# Patient Record
Sex: MALE | Race: WHITE | Employment: UNEMPLOYED | URBAN - METROPOLITAN AREA
[De-identification: names, ages, dates, MRNs, and addresses within clinical notes are randomized per-mention and may not be internally consistent; named-entity substitution may affect disease eponyms.]

---

## 2022-05-15 ENCOUNTER — APPOINTMENT (OUTPATIENT)
Dept: MRI IMAGING | Age: 77
DRG: 056 | End: 2022-05-15
Payer: OTHER GOVERNMENT

## 2022-05-15 ENCOUNTER — APPOINTMENT (OUTPATIENT)
Dept: GENERAL RADIOLOGY | Age: 77
DRG: 056 | End: 2022-05-15
Payer: OTHER GOVERNMENT

## 2022-05-15 ENCOUNTER — APPOINTMENT (OUTPATIENT)
Dept: CT IMAGING | Age: 77
DRG: 056 | End: 2022-05-15
Payer: OTHER GOVERNMENT

## 2022-05-15 ENCOUNTER — HOSPITAL ENCOUNTER (INPATIENT)
Age: 77
LOS: 12 days | Discharge: NURSING, REHAB OR LONG-TERM CARE | DRG: 056 | End: 2022-05-27
Attending: EMERGENCY MEDICINE | Admitting: INTERNAL MEDICINE
Payer: OTHER GOVERNMENT

## 2022-05-15 DIAGNOSIS — R55 SYNCOPE AND COLLAPSE: Primary | ICD-10-CM

## 2022-05-15 DIAGNOSIS — D69.6 THROMBOCYTOPENIA (HCC): ICD-10-CM

## 2022-05-15 PROBLEM — R53.1 WEAKNESS: Status: ACTIVE | Noted: 2022-05-15

## 2022-05-15 LAB
-: NORMAL
ABSOLUTE EOS #: 0.26 K/UL (ref 0–0.4)
ABSOLUTE IMMATURE GRANULOCYTE: 0 K/UL (ref 0–0.3)
ABSOLUTE LYMPH #: 0.43 K/UL (ref 1–4.8)
ABSOLUTE MONO #: 0.94 K/UL (ref 0.1–0.8)
ALBUMIN SERPL-MCNC: 3.7 G/DL (ref 3.5–5.2)
ALBUMIN/GLOBULIN RATIO: 1.3 (ref 1–2.5)
ALP BLD-CCNC: 55 U/L (ref 40–129)
ALT SERPL-CCNC: 21 U/L (ref 5–41)
ANION GAP SERPL CALCULATED.3IONS-SCNC: 11 MMOL/L (ref 9–17)
AST SERPL-CCNC: 42 U/L
BASOPHILS # BLD: 1 % (ref 0–2)
BASOPHILS ABSOLUTE: 0.09 K/UL (ref 0–0.2)
BILIRUB SERPL-MCNC: 0.59 MG/DL (ref 0.3–1.2)
BILIRUBIN URINE: NEGATIVE
BUN BLDV-MCNC: 45 MG/DL (ref 8–23)
CALCIUM SERPL-MCNC: 8.8 MG/DL (ref 8.6–10.4)
CHLORIDE BLD-SCNC: 103 MMOL/L (ref 98–107)
CO2: 29 MMOL/L (ref 20–31)
COLOR: YELLOW
CREAT SERPL-MCNC: 1.83 MG/DL (ref 0.7–1.2)
D-DIMER QUANTITATIVE: 1.21 MG/L FEU
EOSINOPHILS RELATIVE PERCENT: 3 % (ref 1–4)
EPITHELIAL CELLS UA: NORMAL /HPF (ref 0–5)
FOLATE: 8.2 NG/ML
GFR AFRICAN AMERICAN: 44 ML/MIN
GFR NON-AFRICAN AMERICAN: 36 ML/MIN
GFR SERPL CREATININE-BSD FRML MDRD: ABNORMAL ML/MIN/{1.73_M2}
GLUCOSE BLD-MCNC: 97 MG/DL (ref 70–99)
GLUCOSE URINE: NEGATIVE
HCT VFR BLD CALC: 35.4 % (ref 40.7–50.3)
HEMOGLOBIN: 11.5 G/DL (ref 13–17)
IMMATURE GRANULOCYTES: 0 %
KETONES, URINE: NEGATIVE
LACTIC ACID, WHOLE BLOOD: 0.8 MMOL/L (ref 0.7–2.1)
LACTIC ACID, WHOLE BLOOD: 2.2 MMOL/L (ref 0.7–2.1)
LEUKOCYTE ESTERASE, URINE: NEGATIVE
LYMPHOCYTES # BLD: 5 % (ref 24–44)
MCH RBC QN AUTO: 29.9 PG (ref 25.2–33.5)
MCHC RBC AUTO-ENTMCNC: 32.5 G/DL (ref 28.4–34.8)
MCV RBC AUTO: 91.9 FL (ref 82.6–102.9)
MONOCYTES # BLD: 11 % (ref 1–7)
MORPHOLOGY: ABNORMAL
NITRITE, URINE: NEGATIVE
NRBC AUTOMATED: 0 PER 100 WBC
PDW BLD-RTO: 13.5 % (ref 11.8–14.4)
PH UA: 5 (ref 5–8)
PLATELET # BLD: 143 K/UL (ref 138–453)
PMV BLD AUTO: 12.8 FL (ref 8.1–13.5)
POTASSIUM SERPL-SCNC: 4.6 MMOL/L (ref 3.7–5.3)
PRO-BNP: 439 PG/ML
PROTEIN UA: NEGATIVE
RBC # BLD: 3.85 M/UL (ref 4.21–5.77)
RBC UA: NORMAL /HPF (ref 0–4)
SEG NEUTROPHILS: 80 % (ref 36–66)
SEGMENTED NEUTROPHILS ABSOLUTE COUNT: 6.78 K/UL (ref 1.8–7.7)
SODIUM BLD-SCNC: 143 MMOL/L (ref 135–144)
SPECIFIC GRAVITY UA: 1.01 (ref 1–1.03)
TOTAL PROTEIN: 6.5 G/DL (ref 6.4–8.3)
TROPONIN, HIGH SENSITIVITY: 55 NG/L (ref 0–22)
TROPONIN, HIGH SENSITIVITY: 59 NG/L (ref 0–22)
TSH SERPL DL<=0.05 MIU/L-ACNC: 2.93 UIU/ML (ref 0.3–5)
TURBIDITY: CLEAR
URINE HGB: NEGATIVE
UROBILINOGEN, URINE: NORMAL
VITAMIN B-12: 730 PG/ML (ref 232–1245)
WBC # BLD: 8.5 K/UL (ref 3.5–11.3)
WBC UA: NORMAL /HPF (ref 0–5)

## 2022-05-15 PROCEDURE — 2060000000 HC ICU INTERMEDIATE R&B

## 2022-05-15 PROCEDURE — 82607 VITAMIN B-12: CPT

## 2022-05-15 PROCEDURE — 84443 ASSAY THYROID STIM HORMONE: CPT

## 2022-05-15 PROCEDURE — 6360000004 HC RX CONTRAST MEDICATION: Performed by: HEALTH CARE PROVIDER

## 2022-05-15 PROCEDURE — 2580000003 HC RX 258: Performed by: HEALTH CARE PROVIDER

## 2022-05-15 PROCEDURE — 82570 ASSAY OF URINE CREATININE: CPT

## 2022-05-15 PROCEDURE — 85379 FIBRIN DEGRADATION QUANT: CPT

## 2022-05-15 PROCEDURE — 2500000003 HC RX 250 WO HCPCS: Performed by: HEALTH CARE PROVIDER

## 2022-05-15 PROCEDURE — 3209999900 CT THORACIC SPINE TRAUMA RECONSTRUCTION

## 2022-05-15 PROCEDURE — 96375 TX/PRO/DX INJ NEW DRUG ADDON: CPT

## 2022-05-15 PROCEDURE — 84300 ASSAY OF URINE SODIUM: CPT

## 2022-05-15 PROCEDURE — 71045 X-RAY EXAM CHEST 1 VIEW: CPT

## 2022-05-15 PROCEDURE — 6360000002 HC RX W HCPCS: Performed by: HEALTH CARE PROVIDER

## 2022-05-15 PROCEDURE — 70450 CT HEAD/BRAIN W/O DYE: CPT

## 2022-05-15 PROCEDURE — 71260 CT THORAX DX C+: CPT

## 2022-05-15 PROCEDURE — 2580000003 HC RX 258

## 2022-05-15 PROCEDURE — 81001 URINALYSIS AUTO W/SCOPE: CPT

## 2022-05-15 PROCEDURE — 83605 ASSAY OF LACTIC ACID: CPT

## 2022-05-15 PROCEDURE — 72158 MRI LUMBAR SPINE W/O & W/DYE: CPT

## 2022-05-15 PROCEDURE — 80053 COMPREHEN METABOLIC PANEL: CPT

## 2022-05-15 PROCEDURE — 85025 COMPLETE CBC W/AUTO DIFF WBC: CPT

## 2022-05-15 PROCEDURE — 83880 ASSAY OF NATRIURETIC PEPTIDE: CPT

## 2022-05-15 PROCEDURE — 84484 ASSAY OF TROPONIN QUANT: CPT

## 2022-05-15 PROCEDURE — 82746 ASSAY OF FOLIC ACID SERUM: CPT

## 2022-05-15 PROCEDURE — 99285 EMERGENCY DEPT VISIT HI MDM: CPT

## 2022-05-15 PROCEDURE — 96365 THER/PROPH/DIAG IV INF INIT: CPT

## 2022-05-15 PROCEDURE — 6360000002 HC RX W HCPCS

## 2022-05-15 PROCEDURE — A9579 GAD-BASE MR CONTRAST NOS,1ML: HCPCS | Performed by: HEALTH CARE PROVIDER

## 2022-05-15 PROCEDURE — 93005 ELECTROCARDIOGRAM TRACING: CPT | Performed by: HEALTH CARE PROVIDER

## 2022-05-15 PROCEDURE — 96376 TX/PRO/DX INJ SAME DRUG ADON: CPT

## 2022-05-15 PROCEDURE — 99223 1ST HOSP IP/OBS HIGH 75: CPT | Performed by: INTERNAL MEDICINE

## 2022-05-15 PROCEDURE — 72131 CT LUMBAR SPINE W/O DYE: CPT

## 2022-05-15 PROCEDURE — 72125 CT NECK SPINE W/O DYE: CPT

## 2022-05-15 RX ORDER — SODIUM CHLORIDE 0.9 % (FLUSH) 0.9 %
10 SYRINGE (ML) INJECTION PRN
Status: DISCONTINUED | OUTPATIENT
Start: 2022-05-15 | End: 2022-05-27 | Stop reason: HOSPADM

## 2022-05-15 RX ORDER — LORAZEPAM 2 MG/ML
1 INJECTION INTRAMUSCULAR ONCE
Status: COMPLETED | OUTPATIENT
Start: 2022-05-15 | End: 2022-05-15

## 2022-05-15 RX ORDER — ACETAMINOPHEN 650 MG/1
650 SUPPOSITORY RECTAL EVERY 6 HOURS PRN
Status: DISCONTINUED | OUTPATIENT
Start: 2022-05-15 | End: 2022-05-26

## 2022-05-15 RX ORDER — SODIUM CHLORIDE 9 MG/ML
INJECTION, SOLUTION INTRAVENOUS CONTINUOUS
Status: DISCONTINUED | OUTPATIENT
Start: 2022-05-15 | End: 2022-05-23

## 2022-05-15 RX ORDER — SODIUM CHLORIDE 0.9 % (FLUSH) 0.9 %
5-40 SYRINGE (ML) INJECTION PRN
Status: DISCONTINUED | OUTPATIENT
Start: 2022-05-15 | End: 2022-05-27 | Stop reason: HOSPADM

## 2022-05-15 RX ORDER — SODIUM CHLORIDE 0.9 % (FLUSH) 0.9 %
5-40 SYRINGE (ML) INJECTION EVERY 12 HOURS SCHEDULED
Status: DISCONTINUED | OUTPATIENT
Start: 2022-05-15 | End: 2022-05-27 | Stop reason: HOSPADM

## 2022-05-15 RX ORDER — POLYETHYLENE GLYCOL 3350 17 G/17G
17 POWDER, FOR SOLUTION ORAL DAILY PRN
Status: DISCONTINUED | OUTPATIENT
Start: 2022-05-15 | End: 2022-05-27 | Stop reason: HOSPADM

## 2022-05-15 RX ORDER — ONDANSETRON 4 MG/1
4 TABLET, ORALLY DISINTEGRATING ORAL EVERY 8 HOURS PRN
Status: DISCONTINUED | OUTPATIENT
Start: 2022-05-15 | End: 2022-05-27 | Stop reason: HOSPADM

## 2022-05-15 RX ORDER — SODIUM CHLORIDE 9 MG/ML
INJECTION, SOLUTION INTRAVENOUS PRN
Status: DISCONTINUED | OUTPATIENT
Start: 2022-05-15 | End: 2022-05-27 | Stop reason: HOSPADM

## 2022-05-15 RX ORDER — HEPARIN SODIUM 5000 [USP'U]/ML
5000 INJECTION, SOLUTION INTRAVENOUS; SUBCUTANEOUS EVERY 8 HOURS SCHEDULED
Status: DISCONTINUED | OUTPATIENT
Start: 2022-05-15 | End: 2022-05-18

## 2022-05-15 RX ORDER — ACETAMINOPHEN 325 MG/1
650 TABLET ORAL EVERY 6 HOURS PRN
Status: DISCONTINUED | OUTPATIENT
Start: 2022-05-15 | End: 2022-05-26

## 2022-05-15 RX ORDER — ONDANSETRON 2 MG/ML
4 INJECTION INTRAMUSCULAR; INTRAVENOUS EVERY 6 HOURS PRN
Status: DISCONTINUED | OUTPATIENT
Start: 2022-05-15 | End: 2022-05-27 | Stop reason: HOSPADM

## 2022-05-15 RX ADMIN — FOLIC ACID: 5 INJECTION, SOLUTION INTRAMUSCULAR; INTRAVENOUS; SUBCUTANEOUS at 16:41

## 2022-05-15 RX ADMIN — IOPAMIDOL 75 ML: 755 INJECTION, SOLUTION INTRAVENOUS at 17:32

## 2022-05-15 RX ADMIN — LORAZEPAM 1 MG: 2 INJECTION INTRAMUSCULAR; INTRAVENOUS at 17:11

## 2022-05-15 RX ADMIN — GADOTERIDOL 10 ML: 279.3 INJECTION, SOLUTION INTRAVENOUS at 20:37

## 2022-05-15 RX ADMIN — LORAZEPAM 1 MG: 2 INJECTION INTRAMUSCULAR at 19:39

## 2022-05-15 RX ADMIN — HEPARIN SODIUM 5000 UNITS: 5000 INJECTION INTRAVENOUS; SUBCUTANEOUS at 23:01

## 2022-05-15 RX ADMIN — LORAZEPAM 1 MG: 2 INJECTION INTRAMUSCULAR at 20:02

## 2022-05-15 RX ADMIN — SODIUM CHLORIDE: 9 INJECTION, SOLUTION INTRAVENOUS at 16:42

## 2022-05-15 RX ADMIN — SODIUM CHLORIDE: 9 INJECTION, SOLUTION INTRAVENOUS at 22:02

## 2022-05-15 RX ADMIN — SODIUM CHLORIDE, PRESERVATIVE FREE 10 ML: 5 INJECTION INTRAVENOUS at 23:02

## 2022-05-15 ASSESSMENT — ENCOUNTER SYMPTOMS
CHEST TIGHTNESS: 0
ABDOMINAL PAIN: 0
COUGH: 0
CONSTIPATION: 0
APNEA: 0
STRIDOR: 0
EYE PAIN: 0
DIARRHEA: 0
SHORTNESS OF BREATH: 0
PHOTOPHOBIA: 0
ABDOMINAL DISTENTION: 0
NAUSEA: 0
WHEEZING: 0
VOMITING: 0
CHOKING: 0
SORE THROAT: 0
TROUBLE SWALLOWING: 0

## 2022-05-15 NOTE — ED NOTES
Pt alert and awake and answering all questions appropriately.       Merleen Kocher, RN  05/15/22 4424

## 2022-05-15 NOTE — ED NOTES
Pt to the ER via [facility] with witnessed syncopal episode at home. Pt did not fall from standing per brother. Pt was hypotensive and responding to pain upon arrival. Placed on full cardiac monitor. Pt has h/o Parkinson's-tremors to BUE noted.  Dr. Moreno Headings at the bedside to evaluate the pt     Kerry Wallace RN  05/15/22 8122

## 2022-05-15 NOTE — CONSULTS
Neurology Consult Note      Reason for Consult:  PD not on medications   Requesting Physician:  Dr. Aguayo Mccarthy:  syncope    History Obtained From:  patient, family member - brother, cousin         HISTORY OF PRESENT ILLNESS:              The patient is a 77 y.o. male with significant past medical history of PD ETOH abuse who presents with syncopal episode that occurred earlier this afternoon after he was on the toilet and called his brother stating he was very weak and could not get up. The majority of the HPI was obtained by his brother who he lives with and his cousin. His care is managed at the [facility] [place] in [place] and does not follow with a neurologist.     His cousin states that he had a syncopal event earlier this afternoon and had a LOC for at least 5 minutes. Prior to this he reportedly required assistance getting out of the bed due to severe generalized weakness and was incontinent. This is now the second time this has occurred. The 1st time was 5/13/22 and reportedly had immediate recovery after the episode without confusion. There was bowel and bladder incontinence at that time. No seizure activity. Family states that he was diagnosed by the [facility] [place] with PD over 5 years ago and has not been on any medications for unknown reasons. They report bilat upper extremity tremors for over 10 years. Strong family history of PD.      CTH negative. CT spine imaging showing multiple round hypodense lesions throughout the lumbar spine most pronounced within L3 vertebral body. He has a history of ETOH abuse with his last drink weeks ago. SBP on arrival 90. He was given IVF. WBC 8.5. Afebrile. Past Medical History:    No past medical history on file. Past Surgical History:    No past surgical history on file. Current Medications:   Current Facility-Administered Medications: 0.9 % sodium chloride infusion, , IntraVENous, Continuous  Allergies:  Patient has no known allergies.     Social History:  TOBACCO:   has no history on file for tobacco use. ETOH:   has no history on file for alcohol use. DRUGS:   has no history on file for drug use. ACTIVITIES OF DAILY LIVING:    INSTRUMENTAL ACTIVITIES OF DAILY LIVING:  Patient is unable to perform all instrumental activities of daily living. Patient's caretaker/ helper is brother. Family History:   No family history on file. REVIEW OF SYSTEMS:  Review of Systems   Constitutional: Negative for activity change, appetite change, chills, diaphoresis, fatigue, fever and unexpected weight change. HENT: Negative for tinnitus and trouble swallowing. Eyes: Negative for photophobia, pain and visual disturbance. Respiratory: Negative for apnea, cough, choking, chest tightness, shortness of breath, wheezing and stridor. Cardiovascular: Negative for chest pain, palpitations and leg swelling. Gastrointestinal: Negative for abdominal distention, abdominal pain, constipation, diarrhea, nausea and vomiting. Genitourinary: Negative for dysuria. Musculoskeletal: Negative for gait problem, neck pain and neck stiffness. Skin: Negative for rash. Neurological: Positive for tremors and weakness. Negative for dizziness, seizures, syncope, facial asymmetry, speech difficulty, light-headedness, numbness and headaches. PHYSICAL EXAM:    Vitals:  /79   Pulse 81   Temp 98.3 °F (36.8 °C) (Oral)   Resp 14   Wt 125 lb (56.7 kg)   SpO2 95%        Physical Exam  Vitals and nursing note reviewed. Constitutional:       General: He is not in acute distress. Appearance: He is well-developed and underweight. He is not diaphoretic. HENT:      Head: Normocephalic and atraumatic. Right Ear: External ear normal.      Left Ear: External ear normal.   Eyes:      General: No scleral icterus. Right eye: No discharge. Left eye: No discharge.       Conjunctiva/sclera: Conjunctivae normal.      Pupils: Pupils are equal, round, and reactive to light. Cardiovascular:      Rate and Rhythm: Normal rate. Pulmonary:      Effort: Pulmonary effort is normal. No respiratory distress. Abdominal:      General: There is no distension. Palpations: Abdomen is soft. Tenderness: There is no abdominal tenderness. There is no guarding. Musculoskeletal:         General: No tenderness or deformity. Normal range of motion. Cervical back: Normal range of motion. Skin:     General: Skin is warm and dry. Capillary Refill: Capillary refill takes less than 2 seconds. Findings: No erythema or rash. Neurological:      General: No focal deficit present. Mental Status: He is alert and oriented to person, place, and time. Mental status is at baseline. GCS: GCS eye subscore is 4. GCS verbal subscore is 5. GCS motor subscore is 6. Cranial Nerves: Cranial nerves are intact. No cranial nerve deficit, dysarthria or facial asymmetry. Sensory: Sensation is intact. No sensory deficit. Motor: Tremor and abnormal muscle tone present. No seizure activity. Coordination: Coordination normal.      Deep Tendon Reflexes: Reflexes are normal and symmetric. Reflexes normal.   Psychiatric:         Behavior: Behavior normal. Behavior is cooperative.        DATA  Recent Results (from the past 24 hour(s))   CBC with Auto Differential    Collection Time: 05/15/22  4:02 PM   Result Value Ref Range    WBC 8.5 3.5 - 11.3 k/uL    RBC 3.85 (L) 4.21 - 5.77 m/uL    Hemoglobin 11.5 (L) 13.0 - 17.0 g/dL    Hematocrit 35.4 (L) 40.7 - 50.3 %    MCV 91.9 82.6 - 102.9 fL    MCH 29.9 25.2 - 33.5 pg    MCHC 32.5 28.4 - 34.8 g/dL    RDW 13.5 11.8 - 14.4 %    Platelets 032 400 - 357 k/uL    MPV 12.8 8.1 - 13.5 fL    NRBC Automated 0.0 0.0 per 100 WBC    Immature Granulocytes 0 0 %    Seg Neutrophils 80 (H) 36 - 66 %    Lymphocytes 5 (L) 24 - 44 %    Monocytes 11 (H) 1 - 7 %    Eosinophils % 3 1 - 4 %    Basophils 1 0 - 2 %    Absolute Immature Granulocyte 0.00 0.00 - 0.30 k/uL    Segs Absolute 6.78 1.8 - 7.7 k/uL    Absolute Lymph # 0.43 (L) 1.0 - 4.8 k/uL    Absolute Mono # 0.94 (H) 0.1 - 0.8 k/uL    Absolute Eos # 0.26 0.0 - 0.4 k/uL    Basophils Absolute 0.09 0.0 - 0.2 k/uL    Morphology 1+ SCHISTOCYTES    Comprehensive Metabolic Panel w/ Reflex to MG    Collection Time: 05/15/22  4:02 PM   Result Value Ref Range    Glucose 97 70 - 99 mg/dL    BUN 45 (H) 8 - 23 mg/dL    CREATININE 1.83 (H) 0.70 - 1.20 mg/dL    Calcium 8.8 8.6 - 10.4 mg/dL    Sodium 143 135 - 144 mmol/L    Potassium 4.6 3.7 - 5.3 mmol/L    Chloride 103 98 - 107 mmol/L    CO2 29 20 - 31 mmol/L    Anion Gap 11 9 - 17 mmol/L    Alkaline Phosphatase 55 40 - 129 U/L    ALT 21 5 - 41 U/L    AST 42 (H) <40 U/L    Total Bilirubin 0.59 0.3 - 1.2 mg/dL    Total Protein 6.5 6.4 - 8.3 g/dL    Albumin 3.7 3.5 - 5.2 g/dL    Albumin/Globulin Ratio 1.3 1.0 - 2.5    GFR Non- 36 (L) >60 mL/min    GFR  44 (L) >60 mL/min    GFR Comment         Troponin    Collection Time: 05/15/22  4:02 PM   Result Value Ref Range    Troponin, High Sensitivity 59 (HH) 0 - 22 ng/L   D-Dimer, Quantitative    Collection Time: 05/15/22  4:02 PM   Result Value Ref Range    D-Dimer, Quant 1.21 mg/L FEU   Lactic Acid    Collection Time: 05/15/22  4:02 PM   Result Value Ref Range    Lactic Acid, Whole Blood 2.2 (H) 0.7 - 2.1 mmol/L   Vitamin B12 & Folate    Collection Time: 05/15/22  4:04 PM   Result Value Ref Range    Vitamin B-12 730 232 - 1245 pg/mL    Folate 8.2 >4.8 ng/mL   TSH with Reflex    Collection Time: 05/15/22  5:28 PM   Result Value Ref Range    TSH 2.93 0.30 - 5.00 uIU/mL   Brain Natriuretic Peptide    Collection Time: 05/15/22  5:28 PM   Result Value Ref Range    Pro- (H) <300 pg/mL   Troponin    Collection Time: 05/15/22  5:28 PM   Result Value Ref Range    Troponin, High Sensitivity 55 (HH) 0 - 22 ng/L   Urinalysis with Microscopic    Collection Time: 05/15/22  6:29 PM Result Value Ref Range    Color, UA Yellow Yellow    Turbidity UA Clear Clear    Glucose, Ur NEGATIVE NEGATIVE    Bilirubin Urine NEGATIVE NEGATIVE    Ketones, Urine NEGATIVE NEGATIVE    Specific Gravity, UA 1.015 1.005 - 1.030    Urine Hgb NEGATIVE NEGATIVE    pH, UA 5.0 5.0 - 8.0    Protein, UA NEGATIVE NEGATIVE    Urobilinogen, Urine Normal Normal    Nitrite, Urine NEGATIVE NEGATIVE    Leukocyte Esterase, Urine NEGATIVE NEGATIVE    -          WBC, UA None 0 - 5 /HPF    RBC, UA None 0 - 4 /HPF    Epithelial Cells UA None 0 - 5 /HPF     IMAGING    CT Head WO Contrast    Result Date: 5/15/2022  Markedly limited exam due to motion artifact. No gross acute intracranial abnormality. CT Cervical Spine WO Contrast    Result Date: 5/15/2022  No acute abnormality of the cervical spine. Advanced multilevel degenerative disc disease and facet arthropathy. CT Lumbar Spine WO Contrast    Result Date: 5/15/2022  Chest CT angiogram: No evidence of pulmonary embolism or acute pulmonary abnormality. Cholelithiasis. Moderate free fluid within peritoneal cavity may be suggestive of ascites. CT of thoracic spine: No acute osseous abnormality. No fracture. CT of lumbar spine: No acute osseous abnormality. No fracture. Mild levoscoliosis of thoracolumbar junction. Multiple round hypodense lesions are noted throughout the lumbar spine most pronounced within L3 vertebral body. Findings may be related to osteopenia and subchondral cystic change. Marrow infiltrative lesions cannot be completely excluded. RECOMMENDATIONS: Unavailable       CT CHEST PULMONARY EMBOLISM W CONTRAST    Result Date: 5/15/2022  Chest CT angiogram: No evidence of pulmonary embolism or acute pulmonary abnormality. Cholelithiasis. Moderate free fluid within peritoneal cavity may be suggestive of ascites. CT of thoracic spine: No acute osseous abnormality. No fracture. CT of lumbar spine: No acute osseous abnormality. No fracture.   Mild levoscoliosis of thoracolumbar junction. Multiple round hypodense lesions are noted throughout the lumbar spine most pronounced within L3 vertebral body. Findings may be related to osteopenia and subchondral cystic change. Marrow infiltrative lesions cannot be completely excluded. RECOMMENDATIONS: Unavailable     CT THORACIC SPINE TRAUMA RECONSTRUCTION    Result Date: 5/15/2022  Chest CT angiogram: No evidence of pulmonary embolism or acute pulmonary abnormality. Cholelithiasis. Moderate free fluid within peritoneal cavity may be suggestive of ascites. CT of thoracic spine: No acute osseous abnormality. No fracture. CT of lumbar spine: No acute osseous abnormality. No fracture. Mild levoscoliosis of thoracolumbar junction. Multiple round hypodense lesions are noted throughout the lumbar spine most pronounced within L3 vertebral body. Findings may be related to osteopenia and subchondral cystic change. Marrow infiltrative lesions cannot be completely excluded. RECOMMENDATIONS: Unavailable         IMPRESSION     Case of a 78 yo M presenting with a syncopal event after complaining of generalized weakness with borderline hypotension when EMS arrived. 5-10 year history of PD not on medications per the [facility]? RECOMMENDATIONS:   1. CTH negative  2. CT lumbar spine showing multiple hypodense lesions throughout the lumbar spine most pronounced within L3. Reccomend further workup with MRI Lumbar spine W WO. This may necessitate a neurosurgery consult. 3. Recommend starting a trial of sinemet 25/100 TID and follow up with neurology at the [facility] or in our clinic. His PD diagnosis was reportedly made by his PCP at the [facility] 5 years ago and is not on any anti-parkinson's medications. Depending on response, his neurologist can adjust the dose/frequency. 4. recommend starting florinef 0.1 mg daily if IVF do not help with pressures. Depending on response his neurologist can adjust from there.    5. Recommend outpatient parkinson's physical therapy. Thank you for the consultation. Will follow. Case consulted with Dr. Albino Ayala.        Josh Cook MD, [facility]  PGY-4 Neurology  5/15/2022 at 7:44 PM

## 2022-05-15 NOTE — CARE COORDINATION
Advance Care Planning     Advance Care Planning Clinical Specialist  Conversation Note      Date of ACP Conversation: 5/15/2022    Conversation Conducted with:  Healthcare Decision Maker: Next of Kin by law (only applies in absence of above) (name) brother Ruma Jones, no legal documents, no children     ACP Clinical Specialist: Glenn Leon RN        Healthcare Decision Maker:     Current Designated Healthcare Decision Maker:     Primary Decision Maker: Christine Turner - Brother/Sister - [phone]  Click here to complete Healthcare Decision Makers including section of the Healthcare Decision Maker Relationship (ie \"Primary\")  Today we documented Decision Maker(s) consistent with Legal Next of Kin hierarchy. Care Preferences    Hospitalization: \"If your health worsens and it becomes clear that your chance of recovery is unlikely, what would your preference be regarding hospitalization? \"    Choice:  [] The patient wants hospitalization. [] The patient prefers comfort-focused treatment without hospitalization. Ventilation: \"If you were in your present state of health and suddenly became very ill and were unable to breathe on your own, what would your preference be about the use of a ventilator (breathing machine) if it were available to you? \"      If the patient would desire the use of ventilator (breathing machine), answer \"yes\". If not, \"no\":     \"If your health worsens and it becomes clear that your chance of recovery is unlikely, what would your preference be about the use of a ventilator (breathing machine) if it were available to you? \"     Would the patient desire the use of ventilator (breathing machine)?:       Resuscitation  \"CPR works best to restart the heart when there is a sudden event, like a heart attack, in someone who is otherwise healthy. Unfortunately, CPR does not typically restart the heart for people who have serious health conditions or who are very sick. \"    \"In the event your heart stopped as a result of an underlying serious health condition, would you want attempts to be made to restart your heart (answer \"yes\" for attempt to resuscitate) or would you prefer a natural death (answer \"no\" for do not attempt to resuscitate)? \"        [] Yes   [] No   Educated Patient / [place] regarding differences between Advance Directives and portable DNR orders.     Length of ACP Conversation in minutes:      Conversation Outcomes:  [] ACP discussion completed  [] Existing advance directive reviewed with patient; no changes to patient's previously recorded wishes  [] New Advance Directive completed  [] Portable Do Not Rescitate prepared for Provider review and signature  [] POLST/POST/MOLST/MOST prepared for Provider review and signature      Follow-up plan:    [] Schedule follow-up conversation to continue planning  [] Referred individual to Provider for additional questions/concerns   [] Advised patient/agent/surrogate to review completed ACP document and update if needed with changes in condition, patient preferences or care setting    [] This note routed to one or more involved healthcare providers

## 2022-05-15 NOTE — CARE COORDINATION
Case Management Initial Discharge Plan  Michael Pérez,             Met with:family member brother Wilton Alva to discuss discharge plans. Information verified: address, contacts, phone number, , insurance Yes  Insurance Provider: [facility]    Emergency Contact/Next of Kin name & number: Dustin Arrieta (brother) [phone]  Who are involved in patient's support system? brother    [facility]  Date of last visit: 1 year ago      Discharge Planning    Living Arrangements:    with brother    Home has 1 stories  1 stairs to climb to get into front door,   Location of bedroom/bathroom in home main    Patient able to perform ADL's: assisted    Current Services (outpatient & in home) [facility] to start this week  DME equipment: rollator  [facility] provider[facility]    Is patient receiving oral anticoagulation therapy? No    If indicated:   Physician managing anticoagulation treatment:   Where does patient obtain lab work for ATC treatment? Does patient have any issues/concerns obtaining medications? No  If yes, what are patient's concerns? Is there a preferred Pharmacy after hours or on weekends? Yes    If yes, which [facility]? [facility]    Potential Assistance Needed:       Patient agreeable to home care: Yes  Freedom of choice provided:  yes    Prior SNF/Rehab Placement and Facility: no  Agreeable to SNF/Rehab: Yes  [place] of choice provided: yes     Evaluation: no    Expected Discharge date:       Patient expects to be discharged to: If home: is the family and/or caregiver wiling & able to provide support at home? Who will be providing this support? Follow Up Appointment: Best Day/ Time:      Transportation provider: needs  Transportation arrangements needed for discharge: No    Readmission Risk              Risk of Unplanned Readmission:  0             Does patient have a readmission risk score greater than 14?:   If yes, follow-up appointment must be made within 7 days of discharge.      Goals of Care:       Educated family on transitional options, provided freedom of choice and are agreeable with plan      Discharge Plan: from home with brother, HHA supposed to start tomorrow.   Likely needs SNF, [facility]    1941 family relates they'd like a referral to [facility], referral faxed      Electronically signed by Bárbara Abarca RN on 5/15/22 at 7:33 PM EDT

## 2022-05-15 NOTE — ED PROVIDER NOTES
[address] [facility]     Emergency Department     Faculty Attestation    I performed a history and physical examination of the patient and discussed management with the resident. I reviewed the resident´s note and agree with the documented findings and plan of care. Any areas of disagreement are noted on the chart. I was personally present for the key portions of any procedures. I have documented in the chart those procedures where I was not present during the key portions. I have reviewed the emergency nurses triage note. I agree with the chief complaint, past medical history, past surgical history, allergies, medications, social and family history as documented unless otherwise noted below. For Physician Assistant/ Nurse Practitioner cases/documentation I have personally evaluated this patient and have completed at least one if not all key elements of the E/M (history, physical exam, and MDM). Additional findings are as noted. Parkinson disease, confusion. Moving all extremities.        EKG Interpretation (difficult to interpret due to motion artifact secondary to Parkinson's disease)    Interpreted by emergency department physician    Rhythm: normal sinus   Rate: normal/91  Axis: Left -31 degrees  Ectopy: none  Conduction: normal  Possible lateral ischemic ST and T wave changes  Q Waves: none    Clinical Impression: Abnormal EKG    MUSA Rey MD  05/15/22 1805       EKG Interpretation (repeat with less motion artifact)    Interpreted by emergency department physician    Rhythm: normal sinus   Rate: normal/83  Axis: Left -48 degrees  Ectopy: none  Conduction: normal  Nonspecific ST and T wave changes  Q Waves: none  Left anterior hemiblock    Clinical Impression: Abnormal EKG    MUSA Rey MD  05/15/22 1014

## 2022-05-15 NOTE — LETTER
[facility]  [address] [address]  Phone: [phone]    No name on file. May 23, 2022     No primary care provider on file. No primary provider on file. Patient: Wiley Devine   MR Number: [id number]   YOB: 1945   Date of Visit: 5/15/2022       Dear No primary care provider on file.:    Thank you for referring Wiley Devine to me for evaluation/treatment. Below are the relevant portions of my assessment and plan of care. If you have questions, please do not hesitate to call me. I look forward to following Kang Carver along with you. Sincerely,      No name on file.

## 2022-05-15 NOTE — CONSULTS
Department of Neurosurgery                                       Resident Consult Note      Reason for Consult: B/L leg weakness  Requesting Physician:  Dr. Christopher Smith:   [x]Dr. Jordana Mancilla  []Dr. Yvonne Mariee  []Dr. Clyde Salcedo     History Obtained From:  Patient and ED resident    CHIEF COMPLAINT:         Bilateral leg weakness    HISTORY OF PRESENT ILLNESS:       The patient is a 77 y.o. male who presents with history of Parkinson, hyperlipidemia and high blood pressure who initially presented with EMS due to syncopal episode. Per report that was given, patient has been have restively worsened lower extremity weakness in the last several weeks. Patient reports that he has been having difficulty standing up from sitting down. At some point he reported having urinary incontinence, no bladder or bowel loss. Patient otherwise denies having any fever, chills. Of not patient is a chronic/daily alcohol user. On arrival, his CT scan of the lumbar did show multiple round hypodense lesion noted throughout most specifically at the L3 vertebral body. Findings could be due to osteopenia or subchondral cystic change. MRI showed echodense lesion throughout the lumbar spine consistent with subchondral cystic change. With L5-S1 grade 1 anterolisthesis and bilateral pars defect and severe bilateral neuroforaminal narrowing. Due to progressive symptoms, neurosurgery consulted for further recommendation. On my assessment, patient was somnolent, he is arousable to sternal rub, he hemodynamically stable, not tachycardic, pressures in the 558M systolic, satting 95% on supplemental oxygen. When he does wake up, he is only able to answer his name and his age, unable to tell me where he is located. Patient with no obvious focal neurodeficits over his upper and lower extremities. PAST MEDICAL HISTORY :       Past Medical History:    No past medical history on file.     Past Surgical History:    No past surgical history on file. Social History:   Social History     Socioeconomic History    Marital status: Single     Spouse name: Not on file    Number of children: Not on file    Years of education: Not on file    Highest education level: Not on file   Occupational History    Not on file   Tobacco Use    Smoking status: Not on file    Smokeless tobacco: Not on file   Substance and Sexual Activity    Alcohol use: Not on file    Drug use: Not on file    Sexual activity: Not on file   Other Topics Concern    Not on file   Social History Narrative    Not on file     Social Determinants of Health     Financial Resource Strain:     Difficulty of Paying Living Expenses: Not on file   Food Insecurity:     Worried About Running Out of Food in the Last Year: Not on file    Ashley of Food in the Last Year: Not on file   Transportation Needs:     Lack of Transportation (Medical): Not on file    Lack of Transportation (Non-Medical): Not on file   Physical Activity:     Days of Exercise per Week: Not on file    Minutes of Exercise per Session: Not on file   Stress:     Feeling of Stress : Not on file   Social Connections:     Frequency of Communication with Friends and Family: Not on file    Frequency of Social Gatherings with Friends and Family: Not on file    Attends Yazdanism Services: Not on file    Active Member of [address] [place] [address] [employer] or Organizations: Not on file    Attends Club or Organization Meetings: Not on file    Marital Status: Not on file   Intimate Partner Violence:     Fear of Current or Ex-Partner: Not on file    Emotionally Abused: Not on file    Physically Abused: Not on file    Sexually Abused: Not on file   Housing Stability:     Unable to Pay for Housing in the Last Year: Not on file    Number of [place] in the Last Year: Not on file    Unstable Housing in the Last Year: Not on file       Family History:   No family history on file. Allergies:  Patient has no known allergies.     Home Medications:  Prior to Admission medications    Not on File       Current Medications:   Current Facility-Administered Medications: 0.9 % sodium chloride infusion, , IntraVENous, Continuous    REVIEW OF SYSTEMS:       CONSTITUTIONAL: negative for fatigue and malaise   EYES: negative for double vision and photophobia    HEENT: negative for tinnitus and sore throat   RESPIRATORY: negative for cough, shortness of breath   CARDIOVASCULAR: negative for chest pain, palpitations   GASTROINTESTINAL: negative for nausea, vomiting   GENITOURINARY: negative for incontinence   MUSCULOSKELETAL: negative for neck or back pain   NEUROLOGICAL: negative for seizures   PSYCHIATRIC: negative for agitated     Review of systems otherwise negative. PHYSICAL EXAM:       BP (!) 160/120   Pulse 89   Temp 98.3 °F (36.8 °C) (Oral)   Resp 14   Wt 125 lb (56.7 kg)   SpO2 98%       CONSTITUTIONAL:  Well developed, well nourished, alert and oriented x 2, in no acute distress. GCS 15   HEAD:  normocephalic, atraumatic    EYES:  Pt with eyes closed most of my exam   ENT:  moist mucous membranes   NECK:  supple, symmetric, no midline tenderness to palpation    BACK:  without midline tenderness, step-offs or deformities    LUNGS:  Equal air entry bilaterally   CARDIOVASCULAR:  normal s1 / s2   ABDOMEN:  Soft, no rigidity   NEUROLOGIC:  EYE OPENING     Spontaneous - 4 []       To voice - 3 []       To pain - 2 []       None - 1 []    VERBAL RESPONSE     Appropriate, oriented - 5 []       Dazed or confused - 4 []       Syllables, expletives - 3 []       Grunts - 2 []       None - 1 []    MOTOR RESPONSE     Spontaneous, command - 6 []       Localizes pain - 5 []       Withdraws pain - 4 []       Abnormal flexion - 3 []       Abnormal extension - 2 []       None - 1 []            Total GCS: 15    Mental Status: Patient was a bit difficult to arouse however he recently received Ativan while he was at MRI.   He is awake to sternal rub, he is able to answer his name, his age. Unable to answer where he is located. ,              Cranial Nerves:    cranial nerves II-XII are grossly intact    Motor Exam:        RUE 5/5      LUE 5/5      RLE 5/5      LLE 5/5  Downgoing plantar flexion and bilateral lower extremity   SKIN:  no rash      LABS AND IMAGING:     CBC with Differential:    Lab Results   Component Value Date    WBC 8.5 05/15/2022    RBC 3.85 05/15/2022    HGB 11.5 05/15/2022    HCT 35.4 05/15/2022     05/15/2022    MCV 91.9 05/15/2022    MCH 29.9 05/15/2022    MCHC 32.5 05/15/2022    RDW 13.5 05/15/2022    LYMPHOPCT 5 05/15/2022    MONOPCT 11 05/15/2022    BASOPCT 1 05/15/2022    MONOSABS 0.94 05/15/2022    LYMPHSABS 0.43 05/15/2022    EOSABS 0.26 05/15/2022    BASOSABS 0.09 05/15/2022     BMP:    Lab Results   Component Value Date     05/15/2022    K 4.6 05/15/2022     05/15/2022    CO2 29 05/15/2022    BUN 45 05/15/2022    LABALBU 3.7 05/15/2022    CREATININE 1.83 05/15/2022    CALCIUM 8.8 05/15/2022    GFRAA 44 05/15/2022    LABGLOM 36 05/15/2022    GLUCOSE 97 05/15/2022       Radiology Review:      CT HEAD  Impression   Markedly limited exam due to motion artifact.  No gross acute intracranial   abnormality     CT CERVICAL  Impression   No acute abnormality of the cervical spine.       Advanced multilevel degenerative disc disease and facet arthropathy. CT THORACIC AND LUMBAR, CTA CHEST  Impression   Chest CT angiogram: No evidence of pulmonary embolism or acute pulmonary   abnormality.       Cholelithiasis.  Moderate free fluid within peritoneal cavity may be   suggestive of ascites.       CT of thoracic spine: No acute osseous abnormality.  No fracture.       CT of lumbar spine: No acute osseous abnormality.  No fracture.  Mild   levoscoliosis of thoracolumbar junction.       Multiple round hypodense lesions are noted throughout the lumbar spine most   pronounced within L3 vertebral body.  Findings may be related to osteopenia and subchondral cystic change.  Marrow infiltrative lesions cannot be   completely excluded.       RECOMMENDATIONS:   Unavailable     MRI LUMBAR  Impression   The previously noted round hypodense lesions throughout the lumbar spine   correspond to subchondral cystic change/degenerative findings.  No evidence   of marrow infiltrative lesion is noted.       Partial visualization of a massive cystic structure within the right side of   peritoneal cavity, measuring approximately 14 cm. The lesion does not appear   to be arising from the right kidney.  If there is need for further   evaluation, CT of the abdomen and pelvis can be obtained.       At L5-S1, grade 1 anterolisthesis, bilateral pars defects and severe   bilateral neural foraminal narrowing       RECOMMENDATIONS:   Unavailable         ASSESSMENT AND PLAN:     There is no problem list on file for this patient. A/P:  This is a 77 y.o. male with history of Parkinson's, chronic alcohol use, presented with syncopal episode. Patient reported bilateral lower extremity weakness. He did have one episode of urine incontinence however no urine retention or bowel loss. Patient with GCS of 15, he does not have any focal neurodeficits on physical exam.  He is laying in a mildly contracture form. MRI did show subchondral cystic change in addition to the L5-S1 grade 1 anterior listhesis bilateral pars defect and severe bilateral neural neural foraminal narrowing. Patient care will be discussed with attending, will reevaluate patient along with attending     - No neurosurgical interventions planned for now  - CTLS recommendations: none  - Encourage ambulation (PT/OT)  - Measure post void residual   - Neuro checks per protocol  - Hold all antiplatelets and anticoagulants  - We recommend SBP < 140    Additional recommendations may follow    Please contact neurosurgery with any changes in patients neurologic status. Thank you for your consult.        Marta Josephine Drummond MD   NS pager [phone]  5/15/2022  7:14 PM

## 2022-05-15 NOTE — ED PROVIDER NOTES
[facility]  ED  Emergency Department Encounter  EmergencyMedicine Resident     Pt Name:Ba Purdy  MRN: [id number]  [place] 1945  Date of evaluation: 5/15/22  PCP:  No primary care provider on file. This patient was evaluated in the Emergency Department for symptoms described in the history of present illness. The patient was evaluated in the context of the global COVID-19 pandemic, which necessitated consideration that the patient might be at risk for infection with the SARS-CoV-2 virus that causes COVID-19. Institutional protocols and algorithms that pertain to the evaluation of patients at risk for COVID-19 are in a state of rapid change based on information released by regulatory bodies including the CDC and federal and state organizations. These policies and algorithms were followed during the patient's care in the ED. CHIEF COMPLAINT       Chief Complaint   Patient presents with    Loss of Consciousness       HISTORY OF PRESENT ILLNESS  (Location/Symptom, Timing/Onset, Context/Setting, Quality, Duration, Modifying Factors, Severity.)      Freedom Greco is a 77 y.o. male with untreated Parkinson's, HTN, and HLD who presents via EMS with syncope that occurred just prior to arrival.  Shortly after arrival to the ED his cousin who has been his caretaker and his brother who he lives with arrive and provide most history as Mr. Cruzito Wolff was altered on arrival.    They report that he is currently at his baseline, was diagnosed with Parkinson's but not on treatment as he stated he was too far progressed and treatment would not be helpful. He normally ambulates at home without significant difficulty but started having lower extremity weakness in the last several weeks. Yesterday was on the toilet and was unable to get back up due to weakness, required lift assist.  Today his cousin was at the house helping him clean the linens as he wet the bed which was new for him.   She states as he was sitting in the wheeled walker next to the bed he became unresponsive to voice and sternal rub. No abnormal shaking or body movements at that time she was unable to awaken him and called EMS. He remained unresponsive until they were in route and started waking up just prior to arrival.  His initial blood pressure was decreased but reading hypertensive in the emergency room, possibly due to extensive Parkinson shaking. He was perseverating on stating \"I'm going to die\". Unable to give clear history other than saying that his back was hurting and his legs were weak. Longstanding heavy drinker, usually goes to the bar regularly but 2 to 4 weeks ago became too weak and stopped driving, has not drank since then, brother states there is no alcohol in the home and is confident his last drink was over 2 weeks ago. Family reports he is currently at his baseline once they arrive. Medical care is at the [address] [place] [address], limited record access, family reports his last visit was over a year ago. They wanted home to be evaluated when he started feeling more week but he didn't want to go. PAST MEDICAL / SURGICAL / SOCIAL / FAMILY HISTORY      has no past medical history on file. Anemia, Cataract ,CHF, Elevated PSA ,Glaucoma, HLD, HTN     has no past surgical history on file.   unknown    Social History     Socioeconomic History    Marital status: Single     Spouse name: Not on file    Number of children: Not on file    Years of education: Not on file    Highest education level: Not on file   Occupational History    Not on file   Tobacco Use    Smoking status: Not on file    Smokeless tobacco: Not on file   Substance and Sexual Activity    Alcohol use: Not on file    Drug use: Not on file    Sexual activity: Not on file   Other Topics Concern    Not on file   Social History Narrative    Not on file     Social Determinants of Health     Financial Resource Strain:     Difficulty of Paying Living Expenses: Not on file   Food Insecurity:     Worried About [email or web address] in the Last Year: Not on file    Ashley of Food in the Last Year: Not on file   Transportation Needs:     Lack of Transportation (Medical): Not on file    Lack of Transportation (Non-Medical): Not on file   Physical Activity:     Days of Exercise per Week: Not on file    Minutes of Exercise per Session: Not on file   Stress:     Feeling of Stress : Not on file   Social Connections:     Frequency of Communication with Friends and Family: Not on file    Frequency of Social Gatherings with Friends and Family: Not on file    Attends Spiritism Services: Not on file    Active Member of [address] [place] [address] or Organizations: Not on file    Attends Club or Organization Meetings: Not on file    Marital Status: Not on file   Intimate Partner Violence:     Fear of Current or Ex-Partner: Not on file    Emotionally Abused: Not on file    Physically Abused: Not on file    Sexually Abused: Not on file   Housing Stability:     Unable to Pay for Housing in the Last Year: Not on file    Number of [place] in the Last Year: Not on file    Unstable Housing in the Last Year: Not on file       No family history on file. Allergies:  Patient has no known allergies. Home Medications:  Prior to Admission medications    Not on File       REVIEW OF SYSTEMS    (2-9 systems for level 4, 10 or more for level 5)      Review of Systems   Constitutional: Positive for activity change and fatigue. Negative for appetite change, chills and fever. HENT: Negative for congestion and sore throat. Respiratory: Negative for cough and shortness of breath. Cardiovascular: Negative for chest pain. Gastrointestinal: Negative for abdominal pain, constipation, diarrhea, nausea and vomiting. Genitourinary: Negative for difficulty urinating and hematuria. Musculoskeletal: Negative for arthralgias and myalgias. Skin: Negative for rash and wound.    Neurological: Positive for syncope and weakness. Psychiatric/Behavioral: Positive for confusion. Negative for agitation and dysphoric mood. PHYSICAL EXAM   (up to 7 for level 4, 8 or more for level 5)      INITIAL VITALS:   /79   Pulse 81   Temp 98.3 °F (36.8 °C) (Oral)   Resp 14   Wt 125 lb (56.7 kg)   SpO2 95%     Physical Exam  Constitutional:       General: He is not in acute distress. Appearance: Normal appearance. Comments: Wet sheets   HENT:      Head: Normocephalic and atraumatic. Nose: Congestion present. Mouth/Throat:      Mouth: Mucous membranes are moist.      Pharynx: Oropharynx is clear. Eyes:      Extraocular Movements: Extraocular movements intact. Pupils: Pupils are equal, round, and reactive to light. Cardiovascular:      Rate and Rhythm: Normal rate and regular rhythm. Pulses: Normal pulses. Heart sounds: Normal heart sounds. Pulmonary:      Effort: Pulmonary effort is normal. No respiratory distress. Breath sounds: Normal breath sounds. Abdominal:      General: Abdomen is flat. Bowel sounds are normal.      Palpations: Abdomen is soft. Tenderness: There is no abdominal tenderness. There is no guarding or rebound. Musculoskeletal:         General: No tenderness. Normal range of motion. Cervical back: Normal range of motion and neck supple. No tenderness. Comments: Reports back pain but no tenderness on exam, unable to localize where his back hurts. Skin:     General: Skin is warm and dry. Capillary Refill: Capillary refill takes less than 2 seconds. Neurological:      General: No focal deficit present. Mental Status: He is alert and oriented to person, place, and time. Psychiatric:         Mood and Affect: Mood normal.         Behavior: Behavior normal.         Thought Content:  Thought content normal.         Judgment: Judgment normal.       INITIAL IMPRESSION / DIFFERENTIAL  DIAGNOSIS / PLAN     INITIAL IMPRESSION / DDX:   43-year-old with untreated Parkinson's and long history of alcohol abuse presents for progressive weakness over the last 2 to 4 weeks. Period of unresponsiveness today with mild hypotension responsive to fluids. Completely cardiac and infectious work-up and consult neurology regarding Parkinson's and weakness. Also consider cauda equina given new urinary incontinence, possibly due to overflow retention. We will start with CT head and spine. EMERGENCY DEPARTMENT COURSE:  ED Course as of 05/15/22 1929   Sun May 15, 2022   1558 All records at [facility] [SM]   Mark Patricia 38 phone number only listed in chart - no answer, report from EMS is that family will be coming [SM]   5 Minimal records found, EMS report of Parkinson's (unknown if dementia is at baseline) but not seen on [facility] problem list.   [SM]   5931 Monitor reading tachycardic 160-200s due to shaking, HR in the 70-90 range on EKG and by pulse [SM]   1639 Creatinine(!): 1.83  Cr 1.39 in June of 2021 [SM]   1645 Troponin, High Sensitivity(!!): 59  Denies chest pain, will try to repeat EKG, significant artifact on initial due to shaking [SM]   1645 Reviewed home meds with cousin caretaker - coreg, lasix, lisinpril, pravastatin, flomax [SM]   1755 Spoke to Cards, plan for q6 trop, if doubling/significant increase will start heparin, otherwise trend until peak and decrease [SM]   1800 BP improved while resting  [SM]   1811 CT with chronic microvascular changes, otherwise grossly unremarkable given limits due to motion [SM]   1820 Troponin, High Sensitivity(!!): 55 [SM]   1854 MRI ordered and NS consulted given symptoms and CT spine findings. Consult to IM for admit placed.   [SM]   1393 MRI to be completed tonight, waiting to hear back from medicine for admit, trend trops overnight []      ED Course User Index  [SM] Chase Ramirez MD       PLAN (LABS / IMAGING / EKG):  Orders Placed This Encounter   Procedures    CT Head WO Contrast    CT Cervical 0. 26 0.0 - 0.4 k/uL    Basophils Absolute 0.09 0.0 - 0.2 k/uL    Morphology 1+ SCHISTOCYTES    Comprehensive Metabolic Panel w/ Reflex to MG   Result Value Ref Range    Glucose 97 70 - 99 mg/dL    BUN 45 (H) 8 - 23 mg/dL    CREATININE 1.83 (H) 0.70 - 1.20 mg/dL    Calcium 8.8 8.6 - 10.4 mg/dL    Sodium 143 135 - 144 mmol/L    Potassium 4.6 3.7 - 5.3 mmol/L    Chloride 103 98 - 107 mmol/L    CO2 29 20 - 31 mmol/L    Anion Gap 11 9 - 17 mmol/L    Alkaline Phosphatase 55 40 - 129 U/L    ALT 21 5 - 41 U/L    AST 42 (H) <40 U/L    Total Bilirubin 0.59 0.3 - 1.2 mg/dL    Total Protein 6.5 6.4 - 8.3 g/dL    Albumin 3.7 3.5 - 5.2 g/dL    Albumin/Globulin Ratio 1.3 1.0 - 2.5    GFR Non- 36 (L) >60 mL/min    GFR  44 (L) >60 mL/min    GFR Comment         Troponin   Result Value Ref Range    Troponin, High Sensitivity 59 (HH) 0 - 22 ng/L   D-Dimer, Quantitative   Result Value Ref Range    D-Dimer, Quant 1.21 mg/L FEU   Urinalysis with Microscopic   Result Value Ref Range    Color, UA Yellow Yellow    Turbidity UA Clear Clear    Glucose, Ur NEGATIVE NEGATIVE    Bilirubin Urine NEGATIVE NEGATIVE    Ketones, Urine NEGATIVE NEGATIVE    Specific Gravity, UA 1.015 1.005 - 1.030    Urine Hgb NEGATIVE NEGATIVE    pH, UA 5.0 5.0 - 8.0    Protein, UA NEGATIVE NEGATIVE    Urobilinogen, Urine Normal Normal    Nitrite, Urine NEGATIVE NEGATIVE    Leukocyte Esterase, Urine NEGATIVE NEGATIVE    -          WBC, UA None 0 - 5 /HPF    RBC, UA None 0 - 4 /HPF    Epithelial Cells UA None 0 - 5 /HPF   Lactic Acid   Result Value Ref Range    Lactic Acid, Whole Blood 2.2 (H) 0.7 - 2.1 mmol/L   TSH with Reflex   Result Value Ref Range    TSH 2.93 0.30 - 5.00 uIU/mL   Brain Natriuretic Peptide   Result Value Ref Range    Pro- (H) <300 pg/mL   Troponin   Result Value Ref Range    Troponin, High Sensitivity 55 (HH) 0 - 22 ng/L   Vitamin B12 & Folate   Result Value Ref Range    Vitamin B-12 730 232 - 1245 pg/mL Folate 8.2 >4.8 ng/mL       RADIOLOGY:  CT Head WO Contrast    Result Date: 5/15/2022  EXAMINATION: CT OF THE HEAD WITHOUT CONTRAST  5/15/2022 5:11 pm TECHNIQUE: CT of the head was performed without the administration of intravenous contrast. Automated exposure control, iterative reconstruction, and/or weight based adjustment of the mA/kV was utilized to reduce the radiation dose to as low as reasonably achievable. COMPARISON: None. HISTORY: Reason for Exam: loc, fall, back pain FINDINGS: Motion artifact is present. BRAIN/VENTRICLES: There is no acute intracranial hemorrhage, mass effect or midline shift. No abnormal extra-axial fluid collection. The gray-white differentiation is maintained without evidence of an acute infarct. There is prominence of the ventricles and sulci due to global parenchymal volume loss. There are nonspecific areas of hypoattenuation within the periventricular and subcortical white matter, which likely represent chronic microvascular ischemic change. ORBITS: The visualized portion of the orbits demonstrate no acute abnormality. SINUSES: The visualized paranasal sinuses and mastoid air cells demonstrate no acute abnormality. SOFT TISSUES/SKULL: No acute abnormality of the visualized skull or soft tissues. Markedly limited exam due to motion artifact. No gross acute intracranial abnormality. CT Cervical Spine WO Contrast    Result Date: 5/15/2022  EXAMINATION: CT OF THE CERVICAL SPINE WITHOUT CONTRAST 5/15/2022 5:11 pm TECHNIQUE: CT of the cervical spine was performed without the administration of intravenous contrast. Multiplanar reformatted images are provided for review. Automated exposure control, iterative reconstruction, and/or weight based adjustment of the mA/kV was utilized to reduce the radiation dose to as low as reasonably achievable. COMPARISON: None.  HISTORY: ORDERING SYSTEM PROVIDED HISTORY: unknown fall, LOC, back pain TECHNOLOGIST PROVIDED HISTORY: unknown fall, LOC, back pain Decision Support Exception - unselect if not a suspected or confirmed emergency medical condition->Emergency Medical Condition (MA) Reason for Exam: loc, fall, back pain FINDINGS: BONES/ALIGNMENT: There is no acute fracture or traumatic malalignment. DEGENERATIVE CHANGES: Straightened cervical lordotic curvature with mild kyphotic deformity centered at C5-6. Advanced multilevel degenerative disc disease with disc space narrowing and spurring throughout the cervical spine. Multilevel facet arthropathy. Mild degenerative change at the C1-2 articulation anteriorly. SOFT TISSUES: There is no prevertebral soft tissue swelling. Calcified atherosclerotic plaque at the level the carotid bifurcation bilaterally. Small thyroid nodules with no imaging follow-up indicated. No acute abnormality of the cervical spine. Advanced multilevel degenerative disc disease and facet arthropathy. CT Lumbar Spine WO Contrast    Result Date: 5/15/2022  EXAMINATION: CTA OF THE CHEST; CT OF THE THORACIC SPINE WITHOUT CONTRAST; CT OF THE LUMBAR SPINE WITHOUT CONTRAST 5/15/2022 5:12 pm TECHNIQUE: CTA of the chest was performed after the administration of intravenous contrast.  Multiplanar reformatted images are provided for review. MIP images are provided for review. Automated exposure control, iterative reconstruction, and/or weight based adjustment of the mA/kV was utilized to reduce the radiation dose to as low as reasonably achievable.; CT of the thoracic spine was performed without the administration of intravenous contrast. Multiplanar reformatted images are provided for review. Automated exposure control, iterative reconstruction, and/or weight based adjustment of the mA/kV was utilized to reduce the radiation dose to as low as reasonably achievable.; CT of the lumbar spine was performed without the administration of intravenous contrast. Multiplanar reformatted images are provided for review.   Adjustment of mA and/or kV according to patient size was utilized. Automated exposure control, iterative reconstruction, and/or weight based adjustment of the mA/kV was utilized to reduce the radiation dose to as low as reasonably achievable. COMPARISON: None. HISTORY: ORDERING SYSTEM PROVIDED HISTORY: elevated d-dimer, syncope TECHNOLOGIST PROVIDED HISTORY: elevated d-dimer, syncope Decision Support Exception - unselect if not a suspected or confirmed emergency medical condition->Emergency Medical Condition (MA) Reason for Exam: loc, fall, back pain FINDINGS: Pulmonary Arteries: Pulmonary arteries are adequately opacified for evaluation. No evidence of intraluminal filling defect to suggest pulmonary embolism. Main pulmonary artery is normal in caliber. Mediastinum: No evidence of mediastinal lymphadenopathy. The heart and pericardium demonstrate no acute abnormality. There is no acute abnormality of the thoracic aorta. Lungs/pleura: The lungs are without acute process. No focal consolidation or pulmonary edema. No evidence of pleural effusion or pneumothorax. Upper Abdomen: Cholelithiasis and free fluid within peritoneal cavity which may be secondary to ascites. Soft Tissues/Bones: No acute bone or soft tissue abnormality. Thoracic: BONES/ALIGNMENT: There is no acute fracture or traumatic malalignment. DEGENERATIVE CHANGES: Multilevel disc and facet degenerative changes of thoracic spine most pronounced from T8-T9 to the T11-T12 SOFT TISSUES: There is no prevertebral soft tissue swelling. Lumbar: BONES/ALIGNMENT: There is no acute fracture or traumatic malalignment. Mild levoscoliosis of thoracolumbar junction. Multiple round hypodense lesions are noted throughout the lumbar spine most pronounced within L3 vertebral body. Findings may be related to osteopenia and subchondral cystic change. DEGENERATIVE CHANGES: Multilevel severe disc and facet degenerative disease most pronounced at T12-L1.  At L5-S1, grade 2 anterolisthesis and bilateral pars defects and severe bilateral neural foraminal narrowing SOFT TISSUES: There is no prevertebral soft tissue swelling. Chest CT angiogram: No evidence of pulmonary embolism or acute pulmonary abnormality. Cholelithiasis. Moderate free fluid within peritoneal cavity may be suggestive of ascites. CT of thoracic spine: No acute osseous abnormality. No fracture. CT of lumbar spine: No acute osseous abnormality. No fracture. Mild levoscoliosis of thoracolumbar junction. Multiple round hypodense lesions are noted throughout the lumbar spine most pronounced within L3 vertebral body. Findings may be related to osteopenia and subchondral cystic change. Marrow infiltrative lesions cannot be completely excluded. RECOMMENDATIONS: Unavailable     XR CHEST PORTABLE    Result Date: 5/15/2022  EXAMINATION: ONE XRAY VIEW OF THE CHEST 5/15/2022 4:18 pm COMPARISON: None. HISTORY: ORDERING SYSTEM PROVIDED HISTORY: loc TECHNOLOGIST PROVIDED HISTORY: loc FINDINGS: The cardiomediastinal silhouette is unremarkable. Aortic vascular calcification with moderate tortuosity. Eventration or elevated right hemidiaphragm. No acute infiltrate, pleural fluid or evidence of overt failure. Distended bowel loops in the upper abdomen. Significant superior subluxation of the right humeral head with respect to the glenoid. No acute cardiopulmonary disease. CT CHEST PULMONARY EMBOLISM W CONTRAST    Result Date: 5/15/2022  EXAMINATION: CTA OF THE CHEST; CT OF THE THORACIC SPINE WITHOUT CONTRAST; CT OF THE LUMBAR SPINE WITHOUT CONTRAST 5/15/2022 5:12 pm TECHNIQUE: CTA of the chest was performed after the administration of intravenous contrast.  Multiplanar reformatted images are provided for review. MIP images are provided for review.  Automated exposure control, iterative reconstruction, and/or weight based adjustment of the mA/kV was utilized to reduce the radiation dose to as low as reasonably achievable.; CT of the thoracic spine was performed without the administration of intravenous contrast. Multiplanar reformatted images are provided for review. Automated exposure control, iterative reconstruction, and/or weight based adjustment of the mA/kV was utilized to reduce the radiation dose to as low as reasonably achievable.; CT of the lumbar spine was performed without the administration of intravenous contrast. Multiplanar reformatted images are provided for review. Adjustment of mA and/or kV according to patient size was utilized. Automated exposure control, iterative reconstruction, and/or weight based adjustment of the mA/kV was utilized to reduce the radiation dose to as low as reasonably achievable. COMPARISON: None. HISTORY: ORDERING SYSTEM PROVIDED HISTORY: elevated d-dimer, syncope TECHNOLOGIST PROVIDED HISTORY: elevated d-dimer, syncope Decision Support Exception - unselect if not a suspected or confirmed emergency medical condition->Emergency Medical Condition (MA) Reason for Exam: loc, fall, back pain FINDINGS: Pulmonary Arteries: Pulmonary arteries are adequately opacified for evaluation. No evidence of intraluminal filling defect to suggest pulmonary embolism. Main pulmonary artery is normal in caliber. Mediastinum: No evidence of mediastinal lymphadenopathy. The heart and pericardium demonstrate no acute abnormality. There is no acute abnormality of the thoracic aorta. Lungs/pleura: The lungs are without acute process. No focal consolidation or pulmonary edema. No evidence of pleural effusion or pneumothorax. Upper Abdomen: Cholelithiasis and free fluid within peritoneal cavity which may be secondary to ascites. Soft Tissues/Bones: No acute bone or soft tissue abnormality. Thoracic: BONES/ALIGNMENT: There is no acute fracture or traumatic malalignment.  DEGENERATIVE CHANGES: Multilevel disc and facet degenerative changes of thoracic spine most pronounced from T8-T9 to the T11-T12 SOFT TISSUES: There is no prevertebral soft tissue swelling. Lumbar: BONES/ALIGNMENT: There is no acute fracture or traumatic malalignment. Mild levoscoliosis of thoracolumbar junction. Multiple round hypodense lesions are noted throughout the lumbar spine most pronounced within L3 vertebral body. Findings may be related to osteopenia and subchondral cystic change. DEGENERATIVE CHANGES: Multilevel severe disc and facet degenerative disease most pronounced at T12-L1. At L5-S1, grade 2 anterolisthesis and bilateral pars defects and severe bilateral neural foraminal narrowing SOFT TISSUES: There is no prevertebral soft tissue swelling. Chest CT angiogram: No evidence of pulmonary embolism or acute pulmonary abnormality. Cholelithiasis. Moderate free fluid within peritoneal cavity may be suggestive of ascites. CT of thoracic spine: No acute osseous abnormality. No fracture. CT of lumbar spine: No acute osseous abnormality. No fracture. Mild levoscoliosis of thoracolumbar junction. Multiple round hypodense lesions are noted throughout the lumbar spine most pronounced within L3 vertebral body. Findings may be related to osteopenia and subchondral cystic change. Marrow infiltrative lesions cannot be completely excluded. RECOMMENDATIONS: Unavailable     CT THORACIC SPINE TRAUMA RECONSTRUCTION    Result Date: 5/15/2022  EXAMINATION: CTA OF THE CHEST; CT OF THE THORACIC SPINE WITHOUT CONTRAST; CT OF THE LUMBAR SPINE WITHOUT CONTRAST 5/15/2022 5:12 pm TECHNIQUE: CTA of the chest was performed after the administration of intravenous contrast.  Multiplanar reformatted images are provided for review. MIP images are provided for review.  Automated exposure control, iterative reconstruction, and/or weight based adjustment of the mA/kV was utilized to reduce the radiation dose to as low as reasonably achievable.; CT of the thoracic spine was performed without the administration of intravenous contrast. Multiplanar reformatted images are provided for review. Automated exposure control, iterative reconstruction, and/or weight based adjustment of the mA/kV was utilized to reduce the radiation dose to as low as reasonably achievable.; CT of the lumbar spine was performed without the administration of intravenous contrast. Multiplanar reformatted images are provided for review. Adjustment of mA and/or kV according to patient size was utilized. Automated exposure control, iterative reconstruction, and/or weight based adjustment of the mA/kV was utilized to reduce the radiation dose to as low as reasonably achievable. COMPARISON: None. HISTORY: ORDERING SYSTEM PROVIDED HISTORY: elevated d-dimer, syncope TECHNOLOGIST PROVIDED HISTORY: elevated d-dimer, syncope Decision Support Exception - unselect if not a suspected or confirmed emergency medical condition->Emergency Medical Condition (MA) Reason for Exam: loc, fall, back pain FINDINGS: Pulmonary Arteries: Pulmonary arteries are adequately opacified for evaluation. No evidence of intraluminal filling defect to suggest pulmonary embolism. Main pulmonary artery is normal in caliber. Mediastinum: No evidence of mediastinal lymphadenopathy. The heart and pericardium demonstrate no acute abnormality. There is no acute abnormality of the thoracic aorta. Lungs/pleura: The lungs are without acute process. No focal consolidation or pulmonary edema. No evidence of pleural effusion or pneumothorax. Upper Abdomen: Cholelithiasis and free fluid within peritoneal cavity which may be secondary to ascites. Soft Tissues/Bones: No acute bone or soft tissue abnormality. Thoracic: BONES/ALIGNMENT: There is no acute fracture or traumatic malalignment. DEGENERATIVE CHANGES: Multilevel disc and facet degenerative changes of thoracic spine most pronounced from T8-T9 to the T11-T12 SOFT TISSUES: There is no prevertebral soft tissue swelling.  Lumbar: BONES/ALIGNMENT: There is no acute fracture or traumatic program.  Efforts were made to edit the dictations but occasionally words are mis-transcribed.)     Tao Peoples MD  Resident  05/15/22 2018

## 2022-05-16 ENCOUNTER — APPOINTMENT (OUTPATIENT)
Dept: MRI IMAGING | Age: 77
DRG: 056 | End: 2022-05-16
Payer: OTHER GOVERNMENT

## 2022-05-16 PROBLEM — I95.1 ORTHOSTATIC HYPOTENSION: Status: ACTIVE | Noted: 2022-05-16

## 2022-05-16 LAB
ABSOLUTE EOS #: 0.17 K/UL (ref 0–0.4)
ABSOLUTE IMMATURE GRANULOCYTE: 0 K/UL (ref 0–0.3)
ABSOLUTE LYMPH #: 1.32 K/UL (ref 1–4.8)
ABSOLUTE MONO #: 0.61 K/UL (ref 0.1–0.8)
ANION GAP SERPL CALCULATED.3IONS-SCNC: 9 MMOL/L (ref 9–17)
BASOPHILS # BLD: 1 % (ref 0–2)
BASOPHILS ABSOLUTE: 0.06 K/UL (ref 0–0.2)
BUN BLDV-MCNC: 40 MG/DL (ref 8–23)
CALCIUM SERPL-MCNC: 7.8 MG/DL (ref 8.6–10.4)
CHLORIDE BLD-SCNC: 102 MMOL/L (ref 98–107)
CO2: 26 MMOL/L (ref 20–31)
CREAT SERPL-MCNC: 1.73 MG/DL (ref 0.7–1.2)
EOSINOPHILS RELATIVE PERCENT: 3 % (ref 1–4)
GFR AFRICAN AMERICAN: 47 ML/MIN
GFR NON-AFRICAN AMERICAN: 39 ML/MIN
GFR SERPL CREATININE-BSD FRML MDRD: ABNORMAL ML/MIN/{1.73_M2}
GLUCOSE BLD-MCNC: 70 MG/DL (ref 70–99)
HCT VFR BLD CALC: 26.7 % (ref 40.7–50.3)
HEMOGLOBIN: 8.9 G/DL (ref 13–17)
IMMATURE GRANULOCYTES: 0 %
LEAD BLOOD: 2 UG/DL (ref 0–4)
LYMPHOCYTES # BLD: 24 % (ref 24–44)
MAGNESIUM: 2 MG/DL (ref 1.6–2.6)
MCH RBC QN AUTO: 30.4 PG (ref 25.2–33.5)
MCHC RBC AUTO-ENTMCNC: 33.3 G/DL (ref 28.4–34.8)
MCV RBC AUTO: 91.1 FL (ref 82.6–102.9)
MONOCYTES # BLD: 11 % (ref 1–7)
MORPHOLOGY: NORMAL
NRBC AUTOMATED: 0 PER 100 WBC
PDW BLD-RTO: 13.7 % (ref 11.8–14.4)
PLATELET # BLD: 106 K/UL (ref 138–453)
PMV BLD AUTO: 13 FL (ref 8.1–13.5)
POTASSIUM SERPL-SCNC: 3.3 MMOL/L (ref 3.7–5.3)
RBC # BLD: 2.93 M/UL (ref 4.21–5.77)
SEG NEUTROPHILS: 61 % (ref 36–66)
SEGMENTED NEUTROPHILS ABSOLUTE COUNT: 3.34 K/UL (ref 1.8–7.7)
SODIUM BLD-SCNC: 137 MMOL/L (ref 135–144)
TROPONIN, HIGH SENSITIVITY: 62 NG/L (ref 0–22)
TROPONIN, HIGH SENSITIVITY: 63 NG/L (ref 0–22)
TROPONIN, HIGH SENSITIVITY: 65 NG/L (ref 0–22)
VITAMIN D 25-HYDROXY: 18.5 NG/ML
WBC # BLD: 5.5 K/UL (ref 3.5–11.3)

## 2022-05-16 PROCEDURE — 97163 PT EVAL HIGH COMPLEX 45 MIN: CPT

## 2022-05-16 PROCEDURE — 80048 BASIC METABOLIC PNL TOTAL CA: CPT

## 2022-05-16 PROCEDURE — 2580000003 HC RX 258: Performed by: STUDENT IN AN ORGANIZED HEALTH CARE EDUCATION/TRAINING PROGRAM

## 2022-05-16 PROCEDURE — 99232 SBSQ HOSP IP/OBS MODERATE 35: CPT | Performed by: INTERNAL MEDICINE

## 2022-05-16 PROCEDURE — 2580000003 HC RX 258

## 2022-05-16 PROCEDURE — 6370000000 HC RX 637 (ALT 250 FOR IP): Performed by: STUDENT IN AN ORGANIZED HEALTH CARE EDUCATION/TRAINING PROGRAM

## 2022-05-16 PROCEDURE — 97530 THERAPEUTIC ACTIVITIES: CPT

## 2022-05-16 PROCEDURE — 99233 SBSQ HOSP IP/OBS HIGH 50: CPT | Performed by: PSYCHIATRY & NEUROLOGY

## 2022-05-16 PROCEDURE — 2700000000 HC OXYGEN THERAPY PER DAY

## 2022-05-16 PROCEDURE — 85025 COMPLETE CBC W/AUTO DIFF WBC: CPT

## 2022-05-16 PROCEDURE — 6360000002 HC RX W HCPCS: Performed by: STUDENT IN AN ORGANIZED HEALTH CARE EDUCATION/TRAINING PROGRAM

## 2022-05-16 PROCEDURE — 2060000000 HC ICU INTERMEDIATE R&B

## 2022-05-16 PROCEDURE — 36415 COLL VENOUS BLD VENIPUNCTURE: CPT

## 2022-05-16 PROCEDURE — 83655 ASSAY OF LEAD: CPT

## 2022-05-16 PROCEDURE — 51701 INSERT BLADDER CATHETER: CPT

## 2022-05-16 PROCEDURE — 94761 N-INVAS EAR/PLS OXIMETRY MLT: CPT

## 2022-05-16 PROCEDURE — 83735 ASSAY OF MAGNESIUM: CPT

## 2022-05-16 PROCEDURE — 97535 SELF CARE MNGMENT TRAINING: CPT

## 2022-05-16 PROCEDURE — 84484 ASSAY OF TROPONIN QUANT: CPT

## 2022-05-16 PROCEDURE — 82306 VITAMIN D 25 HYDROXY: CPT

## 2022-05-16 PROCEDURE — 97167 OT EVAL HIGH COMPLEX 60 MIN: CPT

## 2022-05-16 RX ORDER — 0.9 % SODIUM CHLORIDE 0.9 %
1000 INTRAVENOUS SOLUTION INTRAVENOUS ONCE
Status: COMPLETED | OUTPATIENT
Start: 2022-05-16 | End: 2022-05-16

## 2022-05-16 RX ORDER — DEXTROSE MONOHYDRATE 50 MG/ML
100 INJECTION, SOLUTION INTRAVENOUS PRN
Status: DISCONTINUED | OUTPATIENT
Start: 2022-05-16 | End: 2022-05-27 | Stop reason: HOSPADM

## 2022-05-16 RX ORDER — POTASSIUM CHLORIDE 20 MEQ/1
40 TABLET, EXTENDED RELEASE ORAL ONCE
Status: COMPLETED | OUTPATIENT
Start: 2022-05-16 | End: 2022-05-16

## 2022-05-16 RX ORDER — CALCIUM CARBONATE 200(500)MG
500 TABLET,CHEWABLE ORAL 3 TIMES DAILY PRN
Status: DISPENSED | OUTPATIENT
Start: 2022-05-16 | End: 2022-05-17

## 2022-05-16 RX ADMIN — CARBIDOPA AND LEVODOPA 1 TABLET: 25; 100 TABLET ORAL at 02:26

## 2022-05-16 RX ADMIN — CARBIDOPA AND LEVODOPA 1 TABLET: 25; 100 TABLET ORAL at 14:57

## 2022-05-16 RX ADMIN — SODIUM CHLORIDE, PRESERVATIVE FREE 10 ML: 5 INJECTION INTRAVENOUS at 20:11

## 2022-05-16 RX ADMIN — SODIUM CHLORIDE, PRESERVATIVE FREE 10 ML: 5 INJECTION INTRAVENOUS at 09:56

## 2022-05-16 RX ADMIN — POTASSIUM CHLORIDE 40 MEQ: 1500 TABLET, EXTENDED RELEASE ORAL at 14:57

## 2022-05-16 RX ADMIN — THIAMINE HYDROCHLORIDE 100 MG: 100 INJECTION, SOLUTION INTRAMUSCULAR; INTRAVENOUS at 09:59

## 2022-05-16 RX ADMIN — SODIUM CHLORIDE 1000 ML: 9 INJECTION, SOLUTION INTRAVENOUS at 12:33

## 2022-05-16 RX ADMIN — CARBIDOPA AND LEVODOPA 1 TABLET: 25; 100 TABLET ORAL at 20:11

## 2022-05-16 RX ADMIN — SODIUM CHLORIDE: 9 INJECTION, SOLUTION INTRAVENOUS at 06:52

## 2022-05-16 RX ADMIN — CARBIDOPA AND LEVODOPA 1 TABLET: 25; 100 TABLET ORAL at 09:54

## 2022-05-16 NOTE — ED NOTES
MRI called pt is not laying flat or still for testing. 1mg Ativan ordered for pt. Upon RN arrival to MRI the pt is alert and oriented x 4 and laying on his side. RR even and unlabored. PT instructed to lay flat and still on his back and given 1mg ativan.       Robin Cary RN  05/15/22 2023

## 2022-05-16 NOTE — CONSULTS
Attestation signed by      Attending Physician Statement:    I have discussed the care of  Salina Sandy , including pertinent history and exam findings, with the Cardiology fellow/resident. I have seen and examined the patient and the key elements of all parts of the encounter have been performed by me. I agree with the assessment, plan and orders as documented by the fellow/resident, after I modified exam findings and plan of treatments, and the final version is my approved version of the assessment. Additional Comments:   History of syncope multifactorial, possible vasovagal, arrhythmogenic, orthostatic changes  Given the history of parkinsonism can also cause syncope due to neurocardiogenic syncope. Patient orthostatics increased standing blood pressure possible due to hyper autonomic  I do not think the patient able to stand for tilt table  Patient tropes are elevated but flat EKG hyperacute T changes with normal potassium  We will get echocardiogram to see any segmental wall motion abnormality or structural heart disease  We will talk with the family how far they want to test from cardiac point of view  Neurology is also on board  Dr. Haydee Hairston Cardiology Cardiology    Consult / H&P               Today's Date: 5/16/2022  Patient Name: Salina Sandy  Date of admission: 5/15/2022  3:39 PM  Patient's age: 77 y. o., 1945  Admission Dx: Syncope and collapse [R55]  Weakness [R53.1]    Reason for Consult:  Cardiac evaluation    Requesting Physician: Yareli Perry MD    CHIEF COMPLAINT:  syncope    History Obtained From:  patient, electronic medical record    HISTORY OF PRESENT ILLNESS:      The patient is a 77 y.o.  male who is admitted to the hospital for witnessed syncopal episode at home. Cardiology is consulted for syncopal episode.  Per family, patient was on the toilet yesterday and was unable to get up due to weakness and required lift assist. After this episode, patient was sitting in a wheel walker and suddenly became unresponsive to voice and sternal rub. No seizure-like activity was noted by family however, patient was found to be incontinent. EMS was called and he remained unresponsive for about 5 minutes. Patient had a similar syncopal episode about 2-3 days ago however here immediately recovered without any confusion. There was bowel and bladder incontinence noted at that time, no seizure-like activity. Patient denies chest pain, shortness of breath, palpitations, or dizziness. He reports some lightheadedness with walking. He is able to walk with walker at baseline. Patient has a PMH significant of HTN, HLD, chronic alcohol abuse and parkinson's disease. ECG showed normal sinus rhythm with some lateral ischemic ST and T wave changes and left anterior fascicular block. Significant labs,   Troponin 59-->55-->62  Cr 1.83-->1.73  K 3.3, Pro-  Hgb 8.9<--11.5    CXR 5/15:   Impression   No acute cardiopulmonary disease. CT head 5/15:   Impression   Markedly limited exam due to motion artifact.  No gross acute intracranial   abnormality. CT Cervical spine 5/15:  Impression   No acute abnormality of the cervical spine.       Advanced multilevel degenerative disc disease and facet arthropathy. CT Chest PE, Lumbar spine 5/15:   Impression   Chest CT angiogram: No evidence of pulmonary embolism or acute pulmonary   abnormality.       Cholelithiasis.  Moderate free fluid within peritoneal cavity may be   suggestive of ascites.       CT of thoracic spine: No acute osseous abnormality.  No fracture.       CT of lumbar spine: No acute osseous abnormality.  No fracture.  Mild   levoscoliosis of thoracolumbar junction.       Multiple round hypodense lesions are noted throughout the lumbar spine most   pronounced within L3 vertebral body.  Findings may be related to osteopenia   and subchondral cystic change.  Marrow infiltrative lesions cannot be completely excluded.       RECOMMENDATIONS:   Unavailable         MRI Lumbar spine 5/15:   Impression   The previously noted round hypodense lesions throughout the lumbar spine   correspond to subchondral cystic change/degenerative findings.  No evidence   of marrow infiltrative lesion is noted.       Partial visualization of a massive cystic structure within the right side of   peritoneal cavity, measuring approximately 14 cm. The lesion does not appear   to be arising from the right kidney.  If there is need for further   evaluation, CT of the abdomen and pelvis can be obtained.       At L5-S1, grade 1 anterolisthesis, bilateral pars defects and severe   bilateral neural foraminal narrowing       RECOMMENDATIONS:   Unavailable     Cardiac history,     No Stress test, Echo or cardiac cath on [facility]    Past Medical History:   has no past medical history on file. Past Surgical History:   has no past surgical history on file.      Home Medications:    Prior to Admission medications    Not on File      Current Facility-Administered Medications: glucose chewable tablet 16 g, 4 tablet, Oral, PRN  dextrose bolus 10% 125 mL, 125 mL, IntraVENous, PRN **OR** dextrose bolus 10% 250 mL, 250 mL, IntraVENous, PRN  glucagon (rDNA) injection 1 mg, 1 mg, IntraMUSCular, PRN  dextrose 5 % solution, 100 mL/hr, IntraVENous, PRN  thiamine (B-1) 100 mg in sodium chloride 0.9 % 100 mL IVPB, 100 mg, IntraVENous, Q24H  0.9 % sodium chloride infusion, , IntraVENous, Continuous  sodium chloride flush 0.9 % injection 10 mL, 10 mL, IntraVENous, PRN  carbidopa-levodopa (SINEMET)  MG per tablet 1 tablet, 1 tablet, Oral, TID  sodium chloride flush 0.9 % injection 5-40 mL, 5-40 mL, IntraVENous, 2 times per day  sodium chloride flush 0.9 % injection 5-40 mL, 5-40 mL, IntraVENous, PRN  0.9 % sodium chloride infusion, , IntraVENous, PRN  ondansetron (ZOFRAN-ODT) disintegrating tablet 4 mg, 4 mg, Oral, Q8H PRN **OR** ondansetron (ZOFRAN) injection 4 mg, 4 mg, IntraVENous, Q6H PRN  polyethylene glycol (GLYCOLAX) packet 17 g, 17 g, Oral, Daily PRN  acetaminophen (TYLENOL) tablet 650 mg, 650 mg, Oral, Q6H PRN **OR** acetaminophen (TYLENOL) suppository 650 mg, 650 mg, Rectal, Q6H PRN  [Held by provider] heparin (porcine) injection 5,000 Units, 5,000 Units, SubCUTAneous, 3 times per day    Allergies:  Patient has no known allergies. Social History:        Family History: family history is not on file. No h/o sudden cardiac death. No for premature CAD    REVIEW OF SYSTEMS:    · Constitutional: there has been no unanticipated weight loss. There's been No change in energy level, No change in activity level. · Eyes: No visual changes or diplopia. No scleral icterus. · ENT: No Headaches  · Cardiovascular: As above  · Respiratory: No previous pulmonary problems, No cough  · Gastrointestinal: No abdominal pain. No change in bowel or bladder habits. · Genitourinary: No dysuria, trouble voiding, or hematuria. · Musculoskeletal:  No gait disturbance, No weakness or joint complaints. · Integumentary: No rash or pruritis. · Neurological: No headache, diplopia, change in muscle strength, numbness or tingling. No change in gait, balance, coordination, mood, affect, memory, mentation, behavior. · Psychiatric: No anxiety, or depression. · Endocrine: No temperature intolerance. No excessive thirst, fluid intake, or urination. No tremor. · Hematologic/Lymphatic: No abnormal bruising or bleeding, blood clots or swollen lymph nodes. · Allergic/Immunologic: No nasal congestion or hives. PHYSICAL EXAM:      /89   Pulse 74   Temp 100.1 °F (37.8 °C) (Temporal)   Resp 17   Ht 5' 9.02\" (1.753 m)   Wt 113 lb 9.6 oz (51.5 kg)   SpO2 98%   BMI 16.77 kg/m²    Constitutional and General Appearance: alert, cooperative, no distress and appears stated age  HEENT: PERRL, no cervical lymphadenopathy. No masses palpable.  Normal oral mucosa  Respiratory:  · Normal excursion and expansion without use of accessory muscles  · Resp Auscultation: Good respiratory effort. No for increased work of breathing. On auscultation: clear to auscultation bilaterally  Cardiovascular:  · The apical impulse is not displaced  · Heart tones are crisp and normal. regular S1 and S2.  · Jugular venous pulsation Normal  · The carotid upstroke is normal in amplitude and contour without delay or bruit  · Peripheral pulses are symmetrical and full   Abdomen:   · No masses or tenderness  · Bowel sounds present  Extremities:  ·  No Cyanosis or Clubbing  ·  Lower extremity edema: No  ·  Skin: Warm and dry  Neurological:  · Alert and oriented. · Moves all extremities well  · No abnormalities of mood, affect, memory, mentation, or behavior are noted    DATA:    Diagnostics:    EKG: normal sinus rhythm with some lateral ischemic ST and T wave changes. ECHO: ordered, but not yet obtained. Stress Test: not obtained. Cardiac Angiography: not obtained. Labs:     CBC:   Recent Labs     05/15/22  1602 05/16/22  0624   WBC 8.5 5.5   HGB 11.5* 8.9*   HCT 35.4* 26.7*    106*     BMP:   Recent Labs     05/15/22  1602 05/16/22  0624    137   K 4.6 3.3*   CO2 29 26   BUN 45* 40*   CREATININE 1.83* 1.73*   LABGLOM 36* 39*   GLUCOSE 97 70     BNP: No results for input(s): BNP in the last 72 hours. PT/INR: No results for input(s): PROTIME, INR in the last 72 hours. APTT:No results for input(s): APTT in the last 72 hours. CARDIAC ENZYMES:No results for input(s): CKTOTAL, CKMB, CKMBINDEX, TROPONINI in the last 72 hours. FASTING LIPID PANEL:No results found for: HDL, LDLDIRECT, LDLCALC, TRIG  LIVER PROFILE:  Recent Labs     05/15/22  1602   AST 42*   ALT 21   LABALBU 3.7       IMPRESSION:    Patient Active Problem List   Diagnosis    Weakness    Syncope and collapse     ASSESSMENT:  1. Syncope and collapse  2.  Elevated troponin (down-trending 59-->55) - likely secondary to KALYAN  3. KALYAN  4. HTN  5. HLD  6. Parkinson's disease  7. Chronic alcohol abuse      RECOMMENDATIONS:  1. Echo ordered, will follow up. 2. Trend troponins, likely from KALYAN. Will obtain Echo. 3. Ordered orthostatics, will follow up to rule out dehydration as cause for syncope. 4. Will plan for tilt table test and Holter monitoring as outpatient to rule out cardiogenic cause of syncope. 5. Rest of the management per primary team.  6. See above       Discussed with patient and Nurse.     Electronically signed by Yimi Roberts MD on 5/16/2022 at 8:00 AM    [facility]      [phone]

## 2022-05-16 NOTE — PROGRESS NOTES
Rn Called back the emergency contact for the patient- Leah Headings ,- [phone]   updates given. Notified to bring the home medication list tomorrow.

## 2022-05-16 NOTE — PLAN OF CARE
Problem: Skin/Tissue Integrity  Goal: Absence of new skin breakdown  Description: 1. Monitor for areas of redness and/or skin breakdown  2. Assess vascular access sites hourly  3. Every 4-6 hours minimum:  Change oxygen saturation probe site  4. Every 4-6 hours:  If on nasal continuous positive airway pressure, respiratory therapy assess nares and determine need for appliance change or resting period. Outcome: Progressing     Problem: Safety - Adult  Goal: Free from fall injury  Outcome: Progressing     Problem: Confusion  Goal: Confusion, delirium, dementia, or psychosis is improved or at baseline  Description: INTERVENTIONS:  1. Assess for possible contributors to thought disturbance, including medications, impaired vision or hearing, underlying metabolic abnormalities, dehydration, psychiatric diagnoses, and notify attending LIP  2. [place] high risk fall precautions, as indicated  3. Provide frequent short contacts to provide reality reorientation, refocusing and direction  4. Decrease environmental stimuli, including noise as appropriate  5. Monitor and intervene to maintain adequate nutrition, hydration, elimination, sleep and activity  6. If unable to ensure safety without constant attention obtain sitter and review sitter guidelines with assigned personnel  7.  Initiate Psychosocial CNS and Spiritual Care consult, as indicated  Outcome: Progressing  Flowsheets (Taken 5/16/2022 0810)  Effect of thought disturbance (confusion, delirium, dementia, or psychosis) are managed with adequate functional status: Assess for contributors to thought disturbance, including medications, impaired vision or hearing, underlying metabolic abnormalities, dehydration, psychiatric diagnoses, notify LIP

## 2022-05-16 NOTE — PROGRESS NOTES
Occupational Therapy  Facility/Department: [address] [facility]PDOWN  Occupational Therapy Initial Assessment    Name: Montel Merlin  : 1945  MRN: [id number]  Date of Service: 2022    Discharge Recommendations:    Further therapy recommended at discharge. Patient Diagnosis(es): The encounter diagnosis was Syncope and collapse. Past Medical History:  has no past medical history on file. Past Surgical History:  has no past surgical history on file. Treatment Diagnosis: syncope      Assessment   Performance deficits / Impairments: Decreased functional mobility ; Decreased ADL status; Decreased endurance;Decreased high-level IADLs;Decreased cognition;Decreased balance;Decreased strength;Decreased fine motor control  Assessment: pt would be unsafe to return to prior living arrangement at this time d/t requiring max A for functional transfers on this date. pt would benefit from further therapy at discharge in order to increase ability to completeADLS and functional transfers/mobility  Treatment Diagnosis: syncope  Prognosis: Fair  Decision Making: High Complexity  REQUIRES OT FOLLOW-UP: Yes  Activity Tolerance  Activity Tolerance: Patient limited by fatigue;Treatment limited secondary to decreased cognition        Plan   Plan  Times per Week: 3-4x/wk     Restrictions  Restrictions/Precautions  Restrictions/Precautions: Fall Risk  Required Braces or Orthoses?: No  Position Activity Restriction  Other position/activity restrictions: up as tolerated, hx of Parkinson's    Subjective   General  Patient assessed for rehabilitation services?: Yes  Family / Caregiver Present: No  Diagnosis: syncope  General Comment  Comments: RN ok'd for therapy this morning.  pt agreeable to participate in session and cooperative/pleasant throughout, although lethargic. pt denied pain throughout session     Social/Functional History  Social/Functional History  Lives With: Family (brother)  Type of Home: House  Home Layout: Two level,Performs ADL's on one level,Able to Live on Main level with bedroom/bathroom  Home Access: Stairs to enter without rails  Entrance Stairs - Number of Steps: 2  Entrance Stairs - Rails: None  Bathroom Shower/Tub: Tub/Shower unit  Bathroom Toilet: Handicap height  Bathroom Equipment: Shower chair,Grab bars in shower  Home Equipment: Rollator,Wheelchair-manual (ambulates with rollator at baseline)  ADL Assistance: Needs assistance (brother assists with bathing occasionally)  Toileting: Independent  Homemaking Assistance: Needs assistance  Homemaking Responsibilities: Yes (shares with brother)  Ambulation Assistance: Independent (with rollator)  Transfer Assistance: Independent  Active : Yes  Mode of Transportation: SUV,Truck  Occupation: Retired  Type of Occupation: [employer]  Additional Comments: Pt reports  is able to provide 24hr assistance. Information obtained from pt, however pt qurstionable historian. Objective   Vision Exceptions: Wears glasses for distance  Hearing: Exceptions to [facility]  Hearing Exceptions: Hard of hearing/hearing concerns          Safety Devices  Type of Devices: Bed alarm in place;Call light within reach; Left in bed;Gait belt;Patient at risk for falls  Restraints  Restraints Initially in Place: No      AROM: Grossly decreased, non-functional  Strength: Grossly decreased, non-functional (B  4-/5)  Coordination: Grossly decreased, non-functional (pt exhibited tremors to B UE impacting fine motor coordination)  Tone: Normal  Sensation: Impaired (pt reported N/T to B hands)     ADL  Feeding: Minimal assistance  Grooming: Minimal assistance  UE Bathing: Minimal assistance  LE Bathing: Maximum assistance  UE Dressing: Minimal assistance  LE Dressing: Maximum assistance  Toileting: Maximum assistance        Bed mobility  Supine to Sit: Maximum assistance  Sit to Supine: Maximum assistance  Scooting: Maximal assistance  Transfers  Sit to stand: Maximum assistance  Stand to sit: Maximum assistance  Transfer Comments: face to face     Balance  Sitting: With support (~10 minutes on EOB. pt initially required min A, then progressed to CGA once feet on floor, then after initial stand required mod A to maintain sitting balance d/t fatigue)  Standing: With support (~2 minutes total. pt completed sit>stand transfer 2x for orthostatics and sheet management. pt required max A to complete)     Cognition  Overall Cognitive Status: Exceptions  Arousal/Alertness: Delayed responses to stimuli  Attention Span: Attends with cues to redirect  Safety Judgement: Decreased awareness of need for assistance;Decreased awareness of need for safety  Insights: Decreased awareness of deficits  Initiation: Requires cues for all  Sequencing: Requires cues for all  Cognition Comment: pt required mod verbalcues to maintain attention to task throughout session.  pt followed 75% of simple commands during session with min-mod verbal cues for initiation   Orienation: Oriented to person, place, time and situation                   Education Given To: Patient  Education Provided: Role of Therapy;Plan of Care  Education Provided Comments: safety during functional transfers/functional mobility, benefits of therapy  Education Method: Verbal;Demonstration  Barriers to Learning: Cognition  Education Outcome: Continued education needed     LUE AROM (degrees)  LUE AROM : Exceptions  L Shoulder Flexion 0-180: 0-70  L Elbow Flexion 0-145: WFL  Left Hand AROM (degrees)  Left Hand AROM: WFL  RUE AROM (degrees)  RUE AROM : Exceptions  R Shoulder Flexion 0-180: 0-50  R Elbow Flexion 0-145: WFL  Right Hand AROM (degrees)  Right Hand AROM: WFL        Hand Dominance  Hand Dominance: Right          AM-PAC Score        AM-[facility] Inpatient Daily Activity Raw Score: 15 (05/16/22 1316)  AM-PAC Inpatient ADL T-Scale Score : 34.69 (05/16/22 1316)  ADL Inpatient CMS 0-100% Score: 56.46 (05/16/22 1316)  ADL Inpatient CMS G-Code Modifier : SUZETTE (05/16/22 9558)    Goals  Short Term Goals  Time Frame for Short term goals: pt will, by discharge  Short Term Goal 1: complete LB ADLs and toileting tasks with mod A, set up and aE, as needed  Short Term Goal 2: complete UB ADLs and grooming tasks with SBA And set up  Short Term Goal 3: increase activity tolerance to 15+ minutes in order to participate in daily tasks  Short Term Goal 4: maintain attention to task for ~4 minutes with 1-2 verbal cues for redirection  Short Term Goal 5: follow 90% of simple commands with 1-2 verbal cues for initiation and sequencing  Short Term Goal 6: dem mod A during functional transfers with LRD ,as needed  Long Term Goals  Time Frame for Long term goals : NOTIFY OTR TO UPDATE GOALS AS APPROPRIATE       Therapy Time   Individual Concurrent Group Co-treatment   Time In 6437         Time Out 0919         Minutes 27      co-eval with PT    Timed Code Treatment Minutes: Fidel 0245, OTR/L

## 2022-05-16 NOTE — H&P
[address]     Department of Internal Medicine - Staff Internal Medicine Teaching Service          ADMISSION NOTE/HISTORY AND PHYSICAL EXAMINATION   Date: 5/15/2022  Patient Name: Jean Claude Medeiros  Date of admission: 5/15/2022  3:39 PM  YOB: 1945  PCP: No primary care provider on file. History Obtained From:  patient, electronic medical record    CHIEF COMPLAINT     Chief complaint: Syncope    HISTORY OF PRESENTING ILLNESS     The patient is a pleasant 77 y.o. male with past medical history signifiacnt for parkinsonism disease, hypertension and hyperlipidemia presents through EMS with chief complaint of syncope that occurred just prior to arrival.  History was mostly provided by the caretaker the ER resident. Report patient is currently at his baseline, was diagnosed with Parkinson's but not on treatment as he was far too progressed in treatment would not be helpful. He normally ambulates at home without significant difficulty but started having lower extremity weakness in the last several weeks. On 5/15/2022 when he was on the toilet he was unable to get back due to weakness, required lift assist.  Today his cousin was at his house helping clean the linens as he wet the bed which was new for him. She states he was sitting in the wheeled walker next to the bed and he became unresponsive to voice and sternal rub. No abnormal shaking or body movements at that time she was unable to wake him and called EMS. He remained unresponsive until they were in route-approximately 5 minutes, and started waking up just prior to arrival.  This is now the second time this has occurred. The first time was 5/13/2022 and reportedly had immediate recovery after the episode without confusion. There was bowel and bladder incontinence at that time. No seizure activity. His initial blood pressure was decreased but reading was hypertensive in the emergency room.   Patient was unable to give a clear history other than saying that his back was hurting and his legs were weak. Longstanding heavy drinker, usually goes to the bar regularly for 2 to 4 weeks ago became too weak and stopped driving, has not drank since then, brother states that there is no alcohol in the home. Family reports bilateral upper extremity tremors for over 10 years. On examination in the ER, patient is afebrile, hemodynamically stable with /79, heart rate 79 and saturating well on 2 L of oxygen nasal cannula    Relevant labs include sodium 143, potassium 4.6, creatinine 1.83, BUN 45, proBNP 439, high-sensitivity troponins at 59 and 55, initial lactic acid 2.2-repeat lactic acid 0.8,     WBC 8.5, hemoglobin 11.5, platelet count 808, glucose 97, AST 42, ALT 21    CT lumbar spine without contrast:    Chest CT angiogram: No evidence of pulmonary embolism or acute pulmonary   abnormality.       Cholelithiasis.  Moderate free fluid within peritoneal cavity may be   suggestive of ascites.       CT of thoracic spine: No acute osseous abnormality.  No fracture.       CT of lumbar spine: No acute osseous abnormality.  No fracture.  Mild   levoscoliosis of thoracolumbar junction.       Multiple round hypodense lesions are noted throughout the lumbar spine most   pronounced within L3 vertebral body. Findings may be related to osteopenia   and subchondral cystic change.  Marrow infiltrative lesions cannot be   completely excluded       MRI lumbar spine:    The previously noted round hypodense lesions throughout the lumbar spine   correspond to subchondral cystic change/degenerative findings.  No evidence   of marrow infiltrative lesion is noted.       Partial visualization of a massive cystic structure within the right side of   peritoneal cavity, measuring approximately 14 cm.  The lesion does not appear   to be arising from the right kidney.  If there is need for further   evaluation, CT of the abdomen and pelvis can be obtained.       At L5-S1, grade 1 anterolisthesis, bilateral pars defects and severe   bilateral neural foraminal narrowing         EKG showed normal sinus rhythm with nonspecific ST-T wave changes and left anterior hemiblock. Review of Systems:  Unable to obtain due to patient's condition    PAST MEDICAL HISTORY     No past medical history on file. PAST SURGICAL HISTORY     No past surgical history on file. ALLERGIES     Patient has no known allergies. MEDICATIONS PRIOR TO ADMISSION     Prior to Admission medications    Not on File       SOCIAL HISTORY     Tobacco: Not on file  Alcohol: Yes  Illicits: Not on file  Occupation: Not on file    FAMILY HISTORY     No family history on file. PHYSICAL EXAM     Vitals: /79   Pulse 81   Temp 98.3 °F (36.8 °C) (Oral)   Resp 18   Wt 125 lb (56.7 kg)   SpO2 95%   Tmax: Temp (24hrs), Av.3 °F (36.8 °C), Min:98.3 °F (36.8 °C), Max:98.3 °F (36.8 °C)    Last Body weight:   Wt Readings from Last 3 Encounters:   05/15/22 125 lb (56.7 kg)   05/15/22 125 lb (56.7 kg)     Body Mass Index : There is no height or weight on file to calculate BMI. PHYSICAL EXAMINATION:  Constitutional: This is a well developed, well nourished, 16-16.9 - Moderate malnutrition / Protein energy malnutrition Grade II 77y.o. year old male who is alert, oriented, cooperative and in no apparent distress. Head:normocephalic and atraumatic. Respiratory:  Breath sounds bilaterally were clear to auscultation. There were no wheezes, rhonchi or rales. There is no intercostal retraction or use of accessory muscles. No egophony noted. Cardiovascular: Regular without murmur, clicks, gallops or rubs. Abdomen: Slightly rounded and soft without organomegaly. No rebound, rigidity or guarding was appreciated. Extremities:  No lower extremity edema, ulcerations, tenderness, varicosities or erythema. Muscle size, tone and strength are normal.  No involuntary movements are noted. Skin:  Warm and dry. Good color, turgor and pigmentation. No lesions or scars.   No cyanosis or clubbing  Neurological/Psychiatric: The patient's general behavior, level of consciousness, thought content and emotional status is normal.          INVESTIGATIONS     Laboratory Testing:     Recent Results (from the past 24 hour(s))   CBC with Auto Differential    Collection Time: 05/15/22  4:02 PM   Result Value Ref Range    WBC 8.5 3.5 - 11.3 k/uL    RBC 3.85 (L) 4.21 - 5.77 m/uL    Hemoglobin 11.5 (L) 13.0 - 17.0 g/dL    Hematocrit 35.4 (L) 40.7 - 50.3 %    MCV 91.9 82.6 - 102.9 fL    MCH 29.9 25.2 - 33.5 pg    MCHC 32.5 28.4 - 34.8 g/dL    RDW 13.5 11.8 - 14.4 %    Platelets 515 618 - 095 k/uL    MPV 12.8 8.1 - 13.5 fL    NRBC Automated 0.0 0.0 per 100 WBC    Immature Granulocytes 0 0 %    Seg Neutrophils 80 (H) 36 - 66 %    Lymphocytes 5 (L) 24 - 44 %    Monocytes 11 (H) 1 - 7 %    Eosinophils % 3 1 - 4 %    Basophils 1 0 - 2 %    Absolute Immature Granulocyte 0.00 0.00 - 0.30 k/uL    Segs Absolute 6.78 1.8 - 7.7 k/uL    Absolute Lymph # 0.43 (L) 1.0 - 4.8 k/uL    Absolute Mono # 0.94 (H) 0.1 - 0.8 k/uL    Absolute Eos # 0.26 0.0 - 0.4 k/uL    Basophils Absolute 0.09 0.0 - 0.2 k/uL    Morphology 1+ SCHISTOCYTES    Comprehensive Metabolic Panel w/ Reflex to MG    Collection Time: 05/15/22  4:02 PM   Result Value Ref Range    Glucose 97 70 - 99 mg/dL    BUN 45 (H) 8 - 23 mg/dL    CREATININE 1.83 (H) 0.70 - 1.20 mg/dL    Calcium 8.8 8.6 - 10.4 mg/dL    Sodium 143 135 - 144 mmol/L    Potassium 4.6 3.7 - 5.3 mmol/L    Chloride 103 98 - 107 mmol/L    CO2 29 20 - 31 mmol/L    Anion Gap 11 9 - 17 mmol/L    Alkaline Phosphatase 55 40 - 129 U/L    ALT 21 5 - 41 U/L    AST 42 (H) <40 U/L    Total Bilirubin 0.59 0.3 - 1.2 mg/dL    Total Protein 6.5 6.4 - 8.3 g/dL    Albumin 3.7 3.5 - 5.2 g/dL    Albumin/Globulin Ratio 1.3 1.0 - 2.5    GFR Non- 36 (L) >60 mL/min    GFR  44 (L) >60 mL/min    GFR pulmonary abnormality. Cholelithiasis. Moderate free fluid within peritoneal cavity may be suggestive of ascites. CT of thoracic spine: No acute osseous abnormality. No fracture. CT of lumbar spine: No acute osseous abnormality. No fracture. Mild levoscoliosis of thoracolumbar junction. Multiple round hypodense lesions are noted throughout the lumbar spine most pronounced within L3 vertebral body. Findings may be related to osteopenia and subchondral cystic change. Marrow infiltrative lesions cannot be completely excluded. RECOMMENDATIONS: Unavailable     XR CHEST PORTABLE    Result Date: 5/15/2022  No acute cardiopulmonary disease. CT CHEST PULMONARY EMBOLISM W CONTRAST    Result Date: 5/15/2022  Chest CT angiogram: No evidence of pulmonary embolism or acute pulmonary abnormality. Cholelithiasis. Moderate free fluid within peritoneal cavity may be suggestive of ascites. CT of thoracic spine: No acute osseous abnormality. No fracture. CT of lumbar spine: No acute osseous abnormality. No fracture. Mild levoscoliosis of thoracolumbar junction. Multiple round hypodense lesions are noted throughout the lumbar spine most pronounced within L3 vertebral body. Findings may be related to osteopenia and subchondral cystic change. Marrow infiltrative lesions cannot be completely excluded. RECOMMENDATIONS: Unavailable     CT THORACIC SPINE TRAUMA RECONSTRUCTION    Result Date: 5/15/2022  Chest CT angiogram: No evidence of pulmonary embolism or acute pulmonary abnormality. Cholelithiasis. Moderate free fluid within peritoneal cavity may be suggestive of ascites. CT of thoracic spine: No acute osseous abnormality. No fracture. CT of lumbar spine: No acute osseous abnormality. No fracture. Mild levoscoliosis of thoracolumbar junction. Multiple round hypodense lesions are noted throughout the lumbar spine most pronounced within L3 vertebral body. Findings may be related to osteopenia and subchondral cystic change. Marrow infiltrative lesions cannot be completely excluded. RECOMMENDATIONS: Unavailable       ASSESSMENT & PLAN     ASSESSMENT / PLAN:     IMPRESSION    Principal Problem:    Syncope: We will check orthostats. EKG showed normal sinus rhythm with nonspecific ST-T wave changes and left anterior hemiblock. Cardiology consulted. 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 I have seen and examined the patient and the key elements of all parts of the encounter have been performed by me. I agree with the assessment, plan and orders as documented by the resident with additions . Treatment plan Discussed with nursing staff in detail , all questions answered . Electronically signed by Liane Duvall MD on   5/16/22 at 12:07 PM EDT    Please note that this chart was generated using voice recognition Dragon dictation software. Although every effort was made to ensure the accuracy of this automated transcription, some errors in transcription may have occurred.

## 2022-05-16 NOTE — PROGRESS NOTES
[facility]  Internal Medicine Teaching Residency Program  Inpatient Daily Progress Note  ______________________________________________________________________________    Patient: Leodan Franz  YOB: 1945   [id number]:[id number]    Acct: [de-identified]     Room: [facility]1394-78  Admit date: 5/15/2022  Today's date: 05/16/22  Number of days in the hospital: 1    SUBJECTIVE   CC: Loss of Consciousness    Pt examined at bedside. Chart & results reviewed. - VSS, pt is saturating well on 2.5 L of NC  Patient is febrile with T max of 100.1, BP- 119/89, HR- 74   - labs reviewed   - no acute events overnight.   - Patient denies headache, vision problems, nausea, vomiting, chest pain, cough, abdominal pain, changes in bowel or urinary habits, and swelling.       ROS:  General ROS: Completed and except as mentioned above were negative   HEENT ROS: Completed and except as mentioned above were negative   Allergy and Immunology ROS:  Completed and except as mentioned above were negative  Hematological and Lymphatic ROS:  Completed and except as mentioned above were negative  Respiratory ROS:  Completed and except as mentioned above were negative  Cardiovascular ROS:  Completed and except as mentioned above were negative  Gastrointestinal ROS: Completed and except as mentioned above were negative  Genito-Urinary ROS:  Completed and except as mentioned above were negative  Musculoskeletal ROS:  Completed and except as mentioned above were negative  Neurological ROS:  Completed and except as mentioned above were negative  Skin & Dermatological ROS:  Completed and except as mentioned above were negative  Psychological ROS:  Completed and except as mentioned above were negative  BRIEF HISTORY   The patient is a pleasant 77 y.o. male with past medical history signifiacnt for parkinsonism disease, hypertension and hyperlipidemia presents through EMS with chief complaint of syncope that occurred just prior to arrival.  History was mostly provided by the caretaker the ER resident.      Report patient is currently at his baseline, was diagnosed with Parkinson's but not on treatment as he was far too progressed in treatment would not be helpful. He normally ambulates at home without significant difficulty but started having lower extremity weakness in the last several weeks. On 5/15/2022 when he was on the toilet he was unable to get back due to weakness, required lift assist.  Today his cousin was at his house helping clean the linens as he wet the bed which was new for him. She states he was sitting in the wheeled walker next to the bed and he became unresponsive to voice and sternal rub. No abnormal shaking or body movements at that time she was unable to wake him and called EMS. He remained unresponsive until they were in route-approximately 5 minutes, and started waking up just prior to arrival.  This is now the second time this has occurred. The first time was 5/13/2022 and reportedly had immediate recovery after the episode without confusion. There was bowel and bladder incontinence at that time. No seizure activity. His initial blood pressure was decreased but reading was hypertensive in the emergency room. Patient was unable to give a clear history other than saying that his back was hurting and his legs were weak.     Longstanding heavy drinker, usually goes to the bar regularly for 2 to 4 weeks ago became too weak and stopped driving, has not drank since then, brother states that there is no alcohol in the home.   Family reports bilateral upper extremity tremors for over 10 years.        On examination in the ER, patient is afebrile, hemodynamically stable with /79, heart rate 79 and saturating well on 2 L of oxygen nasal cannula     Relevant labs include sodium 143, potassium 4.6, creatinine 1.83, BUN 45, proBNP 439, high-sensitivity troponins at 59 and 55, initial lactic acid 2.2-repeat lactic acid 0.8,      WBC 8.5, hemoglobin 11.5, platelet count 601, glucose 97, AST 42, ALT 21    OBJECTIVE     Vital Signs:  /89   Pulse 74   Temp 100.1 °F (37.8 °C) (Temporal)   Resp 17   Ht 5' 9.02\" (1.753 m)   Wt 113 lb 9.6 oz (51.5 kg)   SpO2 98%   BMI 16.77 kg/m²     Temp (24hrs), Av.6 °F (37 °C), Min:97.9 °F (36.6 °C), Max:100.1 °F (37.8 °C)    In: 1200   Out: 1318 [Urine:1318]    Physical Exam:  Constitutional: This is a well developed, well nourished, 16-16.9 - Moderate malnutrition / Protein energy malnutrition Grade II 77y.o. year old male   Respiratory:  Breath sounds bilaterally were clear to auscultation. There were no wheezes, rhonchi or rales. There is no intercostal retraction or use of accessory muscles. Cardiovascular: Regular without murmur, clicks, gallops or rubs. Abdomen: Slightly rounded and soft. No rebound, rigidity or guarding was appreciated. Extremities:  No lower extremity edema, ulcerations, tenderness, varicosities or erythema. Muscle size, tone and strength are normal.  No involuntary movements are noted. Skin:  Warm and dry. Good color, turgor and pigmentation. No lesions or scars.   No cyanosis or clubbing  Neurological/Psychiatric: The patient's general behavior, level of consciousness, thought content and emotional status is normal.        Medications:  Scheduled Medications:    thiamine (VITAMIN B1) IVPB  100 mg IntraVENous Q24H    carbidopa-levodopa  1 tablet Oral TID    sodium chloride flush  5-40 mL IntraVENous 2 times per day    [Held by provider] heparin (porcine)  5,000 Units SubCUTAneous 3 times per day     Continuous Infusions:    dextrose      sodium chloride 75 mL/hr at 22 0652    sodium chloride       PRN Medicationsglucose, 4 tablet, PRN  dextrose bolus, 125 mL, PRN   Or  dextrose bolus, 250 mL, PRN  glucagon (rDNA), 1 mg, PRN  dextrose, 100 mL/hr, PRN  sodium chloride flush, 10 mL, PRN  sodium chloride flush, 5-40 mL, PRN  sodium chloride, , PRN  ondansetron, 4 mg, Q8H PRN   Or  ondansetron, 4 mg, Q6H PRN  polyethylene glycol, 17 g, Daily PRN  acetaminophen, 650 mg, Q6H PRN   Or  acetaminophen, 650 mg, Q6H PRN        Diagnostic Labs:  CBC:   Recent Labs     05/15/22  1602   WBC 8.5   RBC 3.85*   HGB 11.5*   HCT 35.4*   MCV 91.9   RDW 13.5        BMP:   Recent Labs     05/15/22  1602      K 4.6      CO2 29   BUN 45*   CREATININE 1.83*     BNP: No results for input(s): BNP in the last 72 hours. PT/INR: No results for input(s): PROTIME, INR in the last 72 hours. APTT: No results for input(s): APTT in the last 72 hours. CARDIAC ENZYMES: No results for input(s): CKMB, CKMBINDEX, TROPONINI in the last 72 hours. Invalid input(s): CKTOTAL;3  FASTING LIPID PANEL:No results found for: CHOL, HDL, TRIG  LIVER PROFILE:   Recent Labs     05/15/22  1602   AST 42*   ALT 21   BILITOT 0.59   ALKPHOS 55      MICROBIOLOGY: No results found for: CULTURE    Imaging:    CT Head WO Contrast    Result Date: 5/15/2022  Markedly limited exam due to motion artifact. No gross acute intracranial abnormality. CT Cervical Spine WO Contrast    Result Date: 5/15/2022  No acute abnormality of the cervical spine. Advanced multilevel degenerative disc disease and facet arthropathy. CT Lumbar Spine WO Contrast    Result Date: 5/15/2022  Chest CT angiogram: No evidence of pulmonary embolism or acute pulmonary abnormality. Cholelithiasis. Moderate free fluid within peritoneal cavity may be suggestive of ascites. CT of thoracic spine: No acute osseous abnormality. No fracture. CT of lumbar spine: No acute osseous abnormality. No fracture. Mild levoscoliosis of thoracolumbar junction. Multiple round hypodense lesions are noted throughout the lumbar spine most pronounced within L3 vertebral body. Findings may be related to osteopenia and subchondral cystic change. Marrow infiltrative lesions cannot be completely excluded. RECOMMENDATIONS: Unavailable     MRI LUMBAR SPINE W WO CONTRAST    Result Date: 5/15/2022  The previously noted round hypodense lesions throughout the lumbar spine correspond to subchondral cystic change/degenerative findings. No evidence of marrow infiltrative lesion is noted. Partial visualization of a massive cystic structure within the right side of peritoneal cavity, measuring approximately 14 cm. The lesion does not appear to be arising from the right kidney. If there is need for further evaluation, CT of the abdomen and pelvis can be obtained. At L5-S1, grade 1 anterolisthesis, bilateral pars defects and severe bilateral neural foraminal narrowing RECOMMENDATIONS: Unavailable     XR CHEST PORTABLE    Result Date: 5/15/2022  No acute cardiopulmonary disease. CT CHEST PULMONARY EMBOLISM W CONTRAST    Result Date: 5/15/2022  Chest CT angiogram: No evidence of pulmonary embolism or acute pulmonary abnormality. Cholelithiasis. Moderate free fluid within peritoneal cavity may be suggestive of ascites. CT of thoracic spine: No acute osseous abnormality. No fracture. CT of lumbar spine: No acute osseous abnormality. No fracture. Mild levoscoliosis of thoracolumbar junction. Multiple round hypodense lesions are noted throughout the lumbar spine most pronounced within L3 vertebral body. Findings may be related to osteopenia and subchondral cystic change. Marrow infiltrative lesions cannot be completely excluded. RECOMMENDATIONS: Unavailable     CT THORACIC SPINE TRAUMA RECONSTRUCTION    Result Date: 5/15/2022  Chest CT angiogram: No evidence of pulmonary embolism or acute pulmonary abnormality. Cholelithiasis. Moderate free fluid within peritoneal cavity may be suggestive of ascites. CT of thoracic spine: No acute osseous abnormality. No fracture. CT of lumbar spine: No acute osseous abnormality. No fracture. Mild levoscoliosis of thoracolumbar junction.  Multiple round hypodense lesions are noted throughout the lumbar spine most pronounced within L3 vertebral body. Findings may be related to osteopenia and subchondral cystic change. Marrow infiltrative lesions cannot be completely excluded. RECOMMENDATIONS: Unavailable       ASSESSMENT & PLAN     Principal Problem:    Weakness  Active Problems:    Syncope and collapse  Resolved Problems:    * No resolved hospital problems. *    Syncope:  orthostats positive- Patient on 0.9% sodium chloride infusion at 75 ml/hour. EKG showed normal sinus rhythm with nonspecific ST-T wave changes and left anterior hemiblock. Cardiology consulted.     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 I have seen and examined the patient and the key elements of all parts of the encounter have been performed by me. I agree with the assessment, plan and orders as documented by the resident with additions . Treatment plan Discussed with nursing staff in detail , all questions answered . Electronically signed by Lord Micah MD on   5/16/22 at 12:08 PM EDT    Please note that this chart was generated using voice recognition Dragon dictation software. Although every effort was made to ensure the accuracy of this automated transcription, some errors in transcription may have occurred.

## 2022-05-16 NOTE — PROGRESS NOTES
Physical Therapy  Facility/Department: [address] STEPDOWN  Physical Therapy Initial Assessment    Name: Jennifer Ascencio  : 1945  MRN: [id number]  Date of Service: 2022  Chief Complaint   Patient presents with    Loss of Consciousness     Discharge Recommendations: Further therapy recommended at discharge. PT Equipment Recommendations  Equipment Needed: No (unasfe to attempt transfers and ambulation without skilled assistance)      Patient Diagnosis(es): The encounter diagnosis was Syncope and collapse. Past Medical History:  has no past medical history on file. Past Surgical History:  has no past surgical history on file. Assessment   Body Structures, Functions, Activity Limitations Requiring Skilled Therapeutic Intervention: Decreased functional mobility ; Decreased strength;Decreased safe awareness;Decreased cognition;Decreased endurance;Decreased balance;Decreased fine motor control;Decreased coordination;Decreased posture  Assessment: The pt required Max A for bed mobility and Max A for sit to stand transfer, unable to ambulate this date due to poor standing balance/tolerance. Recommend continued PT to address deficits and progress toward prior level of independence. The pt is currently unsafe to return to prior living arrangement due to level of assistance required for functional mobility.   Therapy Prognosis: Fair  Decision Making: High Complexity  Barriers to Learning: cognition, hearing  Requires PT Follow-Up: Yes  Activity Tolerance  Activity Tolerance: Patient limited by endurance     Plan   Plan  Plan:  (5-6x/wk)  Current Treatment Recommendations: Strengthening,Balance training,ROM,Functional mobility training,Transfer training,Endurance training,Therapeutic activities,Safety education & training,Equipment evaluation, education, & procurement,Gait training  Safety Devices  Type of Devices: Bed alarm in place,Call light within reach,Left in bed,Gait belt  Restraints  Restraints Initially in Place: No     Restrictions  Restrictions/Precautions  Restrictions/Precautions: Fall Risk,Up as Tolerated  Required Braces or Orthoses?: No  Position Activity Restriction  Other position/activity restrictions: hx of Parkinson's     Subjective   General  Patient assessed for rehabilitation services?: Yes  Response To Previous Treatment: Not applicable  Family / Caregiver Present: No  Follows Commands: Impaired (requires redirection and repetition)  General Comment  Comments: Pt denies pain throughout. Subjective  Subjective: RN and pt agreeable to PT. Pt supine in bed upon arrival, pleasant and cooperative throughout. Pt dysarthric and perseverates on drinking water, repeatedly educated on NPO status. Pt on 2L O2 via NC throughout mobility. Social/Functional History  Social/Functional History  Lives With: Family (brother)  Type of Home: House  Home Layout: Two level,Performs ADL's on one level,Able to Live on Main level with bedroom/bathroom  Home Access: Stairs to enter without rails  Entrance Stairs - Number of Steps: 2  Entrance Stairs - Rails: None  Bathroom Shower/Tub: Tub/Shower unit  Bathroom Toilet: Handicap height  Bathroom Equipment: Shower chair,Grab bars in shower  Home Equipment: Rollator,Wheelchair-manual (ambulates with rollator at baseline)  ADL Assistance: Needs assistance (brother assists with bathing occasionally)  Toileting: Independent  Homemaking Assistance: Needs assistance  Homemaking Responsibilities: Yes (shares with brother)  Ambulation Assistance: Independent (with rollator)  Transfer Assistance: Independent  Active : Yes  Mode of Transportation: [place] [employer]  Occupation: Retired  Type of Occupation: [place]  Additional Comments: Pt reports brother is able to provide 24hr assistance. Information obtained from pt, however pt qurstionable historian.   Vision/Hearing  Vision Exceptions: Wears glasses for distance  Hearing: Exceptions to [facility]  Hearing Exceptions: Hard of hearing/hearing concerns    Cognition   Orientation  Overall Orientation Status: Within Functional Limits  Orientation Level: Oriented X4  Cognition  Overall Cognitive Status: Exceptions  Arousal/Alertness: Delayed responses to stimuli  Attention Span: Attends with cues to redirect  Memory: Decreased recall of recent events  Safety Judgement: Decreased awareness of need for assistance;Decreased awareness of need for safety  Problem Solving: Assistance required to generate solutions;Assistance required to correct errors made  Insights: Decreased awareness of deficits  Initiation: Requires cues for all  Sequencing: Requires cues for all  Cognition Comment: pt required mod verbalcues to maintain attention to task throughout session     Objective   Gross Assessment  Coordination:  (B hand tremors noted at rest and with mobility)  Tone: Normal  Sensation: Impaired (pt reports numbness to BUE)     Joint Mobility  Spine: WFL, kyphotic  ROM RLE: WFL  ROM LLE: WFL  ROM RUE: AROM shoulder fleixon ~50 degrees, otherwise WFL  ROM LUE: AROM shoulder flexion ~90 degrees, otherwise WFL  Strength RLE  Comment: at least 3/5 based on observed functional mobility, pt did not appropriately follow commands for MMT  Strength LLE  Comment: at least 3/5 based on observed functional mobility, pt did not appropriately follow commands for MMT  Strength RUE  Comment: Shoulder 3-/5, otherwise WFL- formally assessed by OT  Strength LUE  Comment: Shoulder 3-/5, otherwise WFL- formally assessed by OT        Balance  Sitting: With support (~10 minutes on EOB. pt initially required min A, then progressed to CGA once feet on floor, then after initial stand required mod A to maintain sitting balance d/t fatigue)  Standing: With support (~2 minutes total. pt completed sit>stand transfer 2x for orthostatics and sheet management.  pt required max A to complete)  Bed mobility  Supine to Sit: Maximum assistance  Sit to Supine: Maximum assistance  Scooting: Maximal assistance  Bed Mobility Comments: HOB elevated ~20 degrees, supine <> sit performed 2x due to arrival of nursing requesting orthostatic vitals  Transfers  Sit to Stand: Maximum Assistance  Stand to sit: Maximum Assistance  Comment: performed sit <> stand 2x. First attempt with mod A x2 and second attempt with Max A x1. Pt with posterior lean upon standing, fatigues quickly, c/o dizziness throughout. Orthostatic vitals negative  Ambulation  More Ambulation?:  (unable to attempt due to poor standing tolerance and Max A to maintain standing. Will require two assist and chair follow to attempt ambulation)  Stairs/Curb  Stairs?: No     Balance  Posture: Fair  Sitting - Static: Fair;-  Sitting - Dynamic: Poor;+  Standing - Static: Poor  Comments: Pt sat EOB ~8 minutes with Min-Mod A to maintain balance with fatigue. Standing balance assessed with Face to face BUE support with therapist blocking B knees to prevent buckling           OutComes Score                                                  AM-PAC Score  AM-PAC Inpatient Mobility Raw Score : 10 (05/16/22 1422)  AM-PAC Inpatient T-Scale Score : 32.29 (05/16/22 1422)  Mobility Inpatient CMS 0-100% Score: 76.75 (05/16/22 1422)  Mobility Inpatient CMS G-Code Modifier : CL (05/16/22 1422)          Goals  Short Term Goals  Time Frame for Short term goals: 14 visits  Short term goal 1: Perform bed mobility with Min A  Short term goal 2: Perform sit to stand tranfers with Min A  Short term goal 3: Ambulate 200ft with RW and CGA  Short term goal 4: Demo Fair dynamic standing balance to decrease risk of falls  Short term goal 5: Participate in 30 minutes of therapy to demo increased enduranc  Additional Goals?: No       Education  Patient Education  Education Given To: Patient  Education Provided: Role of Therapy;Plan of Care;Transfer Training  Education Method: Verbal  Barriers to Learning: Cognition; Hearing  Education Outcome: Verbalized understanding;Continued education needed      Therapy Time   Individual Concurrent Group Co-treatment   Time In [facility]         Time Out 0919         Minutes 28         Timed Code Treatment Minutes: 10 Minutes       Murray Trevino, PT

## 2022-05-16 NOTE — CARE COORDINATION
Transitional planning    Writer placed call to [employer], left vm on [facility] line with request for call back. Writer received call from [facility] and they do not accept his insurance, contacted brother Wilfredo Gatica and choices obtained and referrals sent to [employer], [facility]. [phone]  Call to [place] at [address], will review and follow up in morning.

## 2022-05-16 NOTE — PROGRESS NOTES
Comprehensive Nutrition Assessment    Type and Reason for Visit:  Consult (poor PO intake, ONS)    Nutrition Recommendations/Plan:   1. Continue diet, Regular, Cardiac Restriction  2. Will send High Thien/High Pro ONS TID  3. Assist/monitor PO intake  4. Moderate to severe malnutrition noted, may need to consider alternate means of nutrition if poor PO intake continues  5. Will monitor labs, weight, plan of care     Malnutrition Assessment:  Malnutrition Status: Moderate malnutrition (to severe) (05/16/22 1323)    Context:  Chronic Illness     Findings of the 6 clinical characteristics of malnutrition:  Energy Intake:  Unable to assess  Weight Loss:  Unable to assess     Body Fat Loss:  moderate Buccal region,Triceps   Muscle Mass Loss:  Severe muscle mass loss Clavicles (pectoralis & deltoids)  Fluid Accumulation:  No significant fluid accumulation     Strength:  Not Performed    Nutrition Assessment:    Pt presents with syncope and untreated Parkinson's, HTN, and HLD. Noted, pt is a longstanding heavy drinker. Unable to assess pt appetite/intake prior to admission, pt shaky and nodding in and out of conversation. Per the nurse, pt needs assistance with feeding due to extensive Parkinson shaking. Ordered daily weights - will monitor, pt currently 72% IBW (underweight). Nutrition Related Findings:    labs/meds reviewed. no edema noted. Wound Type: None       Current Nutrition Intake & Therapies:    Average Meal Intake: Unable to assess  Average Supplements Intake: None Ordered  ADULT DIET; Regular; 4 carb choices (60 gm/meal); Low Fat/Low Chol/High Fiber/2 gm Na  ADULT ORAL NUTRITION SUPPLEMENT; Breakfast, Lunch, Dinner; Standard High Calorie/High Protein Oral Supplement    Anthropometric Measures:  Height: 5' 9.02\" (175.3 cm)  Ideal Body Weight (IBW): 160 lbs (73 kg)       Current Body Weight: 113 lb 9.6 oz (51.5 kg),   IBW.  Weight Source: Bed Scale  Current BMI (kg/m2): 16.8        Weight Adjustment For: No Adjustment                 BMI Categories: Underweight (BMI less than 22) age over 72    Estimated Daily Nutrient Needs:  Energy Requirements Based On: Kcal/kg     Energy (kcal/day): 7042-4082 kcals/day (30-33 kcal/kg)  Weight Used for Protein Requirements: Current  Protein (g/day): 80 g/day (1.5)     Fluid (ml/day): per MD    Nutrition Diagnosis:   · Inadequate oral intake related to cognitive or neurological impairment as evidenced by consult for poor intake 5 or more days, need for ONS      Nutrition Interventions:   Food and/or Nutrient Delivery: Continue Current Diet,Start Oral Nutrition Supplement  Nutrition Education/Counseling: No recommendation at this time  Coordination of Nutrition Care: Continue to monitor while inpatient       Goals:     Goals: Meet at least 75% of estimated needs,prior to discharge       Nutrition Monitoring and Evaluation:   Behavioral-Environmental Outcomes: None Identified  Food/Nutrient Intake Outcomes: Food and Nutrient Intake  Physical Signs/Symptoms Outcomes: Biochemical Data,Nutrition Focused Physical Findings,Weight,Chewing or Swallowing    Discharge Planning:     Too soon to determine     Marlin Tang [address] [facility] [address]  Contact: [phone]

## 2022-05-16 NOTE — PLAN OF CARE
Problem: Discharge Planning  Goal: Discharge to home or other facility with appropriate resources  Outcome: Progressing     Problem: Skin/Tissue Integrity  Goal: Absence of new skin breakdown  Description: 1. Monitor for areas of redness and/or skin breakdown  2. Assess vascular access sites hourly  3. Every 4-6 hours minimum:  Change oxygen saturation probe site  4. Every 4-6 hours:  If on nasal continuous positive airway pressure, respiratory therapy assess nares and determine need for appliance change or resting period. Outcome: Progressing     Problem: Safety - Adult  Goal: Free from fall injury  Outcome: Progressing     Problem: Confusion  Goal: Confusion, delirium, dementia, or psychosis is improved or at baseline  Description: INTERVENTIONS:  1. Assess for possible contributors to thought disturbance, including medications, impaired vision or hearing, underlying metabolic abnormalities, dehydration, psychiatric diagnoses, and notify attending LIP  2. [facility] high risk fall precautions, as indicated  3. Provide frequent short contacts to provide reality reorientation, refocusing and direction  4. Decrease environmental stimuli, including noise as appropriate  5. Monitor and intervene to maintain adequate nutrition, hydration, elimination, sleep and activity  6. If unable to ensure safety without constant attention obtain sitter and review sitter guidelines with assigned personnel  7.  Initiate Psychosocial CNS and Spiritual Care consult, as indicated  Outcome: Progressing

## 2022-05-17 ENCOUNTER — APPOINTMENT (OUTPATIENT)
Dept: MRI IMAGING | Age: 77
DRG: 056 | End: 2022-05-17
Payer: OTHER GOVERNMENT

## 2022-05-17 ENCOUNTER — APPOINTMENT (OUTPATIENT)
Dept: GENERAL RADIOLOGY | Age: 77
DRG: 056 | End: 2022-05-17
Payer: OTHER GOVERNMENT

## 2022-05-17 PROBLEM — I95.9 HYPOTENSION: Status: ACTIVE | Noted: 2022-05-16

## 2022-05-17 PROBLEM — U07.1 COVID-19: Status: ACTIVE | Noted: 2022-05-17

## 2022-05-17 PROBLEM — I10 ORTHOSTATIC HYPERTENSION: Status: ACTIVE | Noted: 2022-05-17

## 2022-05-17 PROBLEM — I10 HYPERTENSION: Status: ACTIVE | Noted: 2022-05-17

## 2022-05-17 PROBLEM — N17.9 AKI (ACUTE KIDNEY INJURY) (HCC): Status: ACTIVE | Noted: 2022-05-17

## 2022-05-17 PROBLEM — G95.9 CERVICAL MYELOPATHY (HCC): Status: ACTIVE | Noted: 2022-05-17

## 2022-05-17 LAB
ABSOLUTE EOS #: <0.03 K/UL (ref 0–0.44)
ABSOLUTE IMMATURE GRANULOCYTE: <0.03 K/UL (ref 0–0.3)
ABSOLUTE LYMPH #: 1.01 K/UL (ref 1.1–3.7)
ABSOLUTE MONO #: 0.55 K/UL (ref 0.1–1.2)
ABSOLUTE RETIC #: 0.02 M/UL (ref 0.03–0.08)
ALBUMIN SERPL-MCNC: 2.4 G/DL (ref 3.5–5.2)
ALBUMIN/GLOBULIN RATIO: 1.1 (ref 1–2.5)
ALP BLD-CCNC: 37 U/L (ref 40–129)
ALT SERPL-CCNC: 6 U/L (ref 5–41)
ANION GAP SERPL CALCULATED.3IONS-SCNC: 8 MMOL/L (ref 9–17)
AST SERPL-CCNC: 41 U/L
BASOPHILS # BLD: 0 % (ref 0–2)
BASOPHILS ABSOLUTE: <0.03 K/UL (ref 0–0.2)
BILIRUB SERPL-MCNC: 0.24 MG/DL (ref 0.3–1.2)
BILIRUBIN DIRECT: 0.11 MG/DL
BILIRUBIN, INDIRECT: 0.13 MG/DL (ref 0–1)
BUN BLDV-MCNC: 39 MG/DL (ref 8–23)
C-REACTIVE PROTEIN: 12.1 MG/L (ref 0–5)
CALCIUM SERPL-MCNC: 7.3 MG/DL (ref 8.6–10.4)
CHLORIDE BLD-SCNC: 106 MMOL/L (ref 98–107)
CO2: 22 MMOL/L (ref 20–31)
CREAT SERPL-MCNC: 1.62 MG/DL (ref 0.7–1.2)
CREATININE URINE: 34.2 MG/DL (ref 39–259)
EKG ATRIAL RATE: 64 BPM
EKG ATRIAL RATE: 83 BPM
EKG P AXIS: 79 DEGREES
EKG P AXIS: 9 DEGREES
EKG P-R INTERVAL: 154 MS
EKG P-R INTERVAL: 164 MS
EKG Q-T INTERVAL: 382 MS
EKG Q-T INTERVAL: 394 MS
EKG QRS DURATION: 92 MS
EKG QRS DURATION: 94 MS
EKG QTC CALCULATION (BAZETT): 406 MS
EKG QTC CALCULATION (BAZETT): 448 MS
EKG R AXIS: -48 DEGREES
EKG R AXIS: -60 DEGREES
EKG T AXIS: 69 DEGREES
EKG T AXIS: 85 DEGREES
EKG VENTRICULAR RATE: 64 BPM
EKG VENTRICULAR RATE: 83 BPM
EOSINOPHILS RELATIVE PERCENT: 0 % (ref 1–4)
FERRITIN: 217 NG/ML (ref 30–400)
GFR AFRICAN AMERICAN: 51 ML/MIN
GFR NON-AFRICAN AMERICAN: 42 ML/MIN
GFR SERPL CREATININE-BSD FRML MDRD: ABNORMAL ML/MIN/{1.73_M2}
GLUCOSE BLD-MCNC: 69 MG/DL (ref 75–110)
GLUCOSE BLD-MCNC: 70 MG/DL (ref 75–110)
GLUCOSE BLD-MCNC: 74 MG/DL (ref 70–99)
GLUCOSE BLD-MCNC: 78 MG/DL (ref 75–110)
GLUCOSE BLD-MCNC: 84 MG/DL (ref 75–110)
HCT VFR BLD CALC: 26.5 % (ref 40.7–50.3)
HEMOGLOBIN: 8.9 G/DL (ref 13–17)
IMMATURE GRANULOCYTES: 0 %
IMMATURE RETIC FRACT: 10.1 % (ref 2.7–18.3)
IRON SATURATION: 18 % (ref 20–55)
IRON: 24 UG/DL (ref 59–158)
LYMPHOCYTES # BLD: 18 % (ref 24–43)
MCH RBC QN AUTO: 30.7 PG (ref 25.2–33.5)
MCHC RBC AUTO-ENTMCNC: 33.6 G/DL (ref 28.4–34.8)
MCV RBC AUTO: 91.4 FL (ref 82.6–102.9)
MONOCYTES # BLD: 10 % (ref 3–12)
NRBC AUTOMATED: 0 PER 100 WBC
PDW BLD-RTO: 13.7 % (ref 11.8–14.4)
PLATELET # BLD: 94 K/UL (ref 138–453)
PMV BLD AUTO: 12.6 FL (ref 8.1–13.5)
POTASSIUM SERPL-SCNC: 4 MMOL/L (ref 3.7–5.3)
RBC # BLD: 2.9 M/UL (ref 4.21–5.77)
RETIC %: 0.9 % (ref 0.5–1.9)
RETIC HEMOGLOBIN: 35.8 PG (ref 28.2–35.7)
SARS-COV-2, RAPID: DETECTED
SEG NEUTROPHILS: 72 % (ref 36–65)
SEGMENTED NEUTROPHILS ABSOLUTE COUNT: 4 K/UL (ref 1.5–8.1)
SODIUM BLD-SCNC: 136 MMOL/L (ref 135–144)
SODIUM,UR: 112 MMOL/L
SPECIMEN DESCRIPTION: ABNORMAL
TOTAL IRON BINDING CAPACITY: 131 UG/DL (ref 250–450)
TOTAL PROTEIN: 4.5 G/DL (ref 6.4–8.3)
TROPONIN, HIGH SENSITIVITY: 58 NG/L (ref 0–22)
TROPONIN, HIGH SENSITIVITY: 64 NG/L (ref 0–22)
UNSATURATED IRON BINDING CAPACITY: 107 UG/DL (ref 112–347)
WBC # BLD: 5.6 K/UL (ref 3.5–11.3)

## 2022-05-17 PROCEDURE — 51798 US URINE CAPACITY MEASURE: CPT

## 2022-05-17 PROCEDURE — 92611 MOTION FLUOROSCOPY/SWALLOW: CPT

## 2022-05-17 PROCEDURE — 83540 ASSAY OF IRON: CPT

## 2022-05-17 PROCEDURE — 87040 BLOOD CULTURE FOR BACTERIA: CPT

## 2022-05-17 PROCEDURE — 72141 MRI NECK SPINE W/O DYE: CPT

## 2022-05-17 PROCEDURE — 2580000003 HC RX 258: Performed by: STUDENT IN AN ORGANIZED HEALTH CARE EDUCATION/TRAINING PROGRAM

## 2022-05-17 PROCEDURE — 86140 C-REACTIVE PROTEIN: CPT

## 2022-05-17 PROCEDURE — 82947 ASSAY GLUCOSE BLOOD QUANT: CPT

## 2022-05-17 PROCEDURE — 84484 ASSAY OF TROPONIN QUANT: CPT

## 2022-05-17 PROCEDURE — 99232 SBSQ HOSP IP/OBS MODERATE 35: CPT | Performed by: INTERNAL MEDICINE

## 2022-05-17 PROCEDURE — 2580000003 HC RX 258

## 2022-05-17 PROCEDURE — 85045 AUTOMATED RETICULOCYTE COUNT: CPT

## 2022-05-17 PROCEDURE — 74230 X-RAY XM SWLNG FUNCJ C+: CPT

## 2022-05-17 PROCEDURE — 80048 BASIC METABOLIC PNL TOTAL CA: CPT

## 2022-05-17 PROCEDURE — 93010 ELECTROCARDIOGRAM REPORT: CPT | Performed by: INTERNAL MEDICINE

## 2022-05-17 PROCEDURE — 2500000003 HC RX 250 WO HCPCS: Performed by: STUDENT IN AN ORGANIZED HEALTH CARE EDUCATION/TRAINING PROGRAM

## 2022-05-17 PROCEDURE — 72040 X-RAY EXAM NECK SPINE 2-3 VW: CPT

## 2022-05-17 PROCEDURE — 2500000003 HC RX 250 WO HCPCS: Performed by: INTERNAL MEDICINE

## 2022-05-17 PROCEDURE — 99232 SBSQ HOSP IP/OBS MODERATE 35: CPT | Performed by: PSYCHIATRY & NEUROLOGY

## 2022-05-17 PROCEDURE — 87635 SARS-COV-2 COVID-19 AMP PRB: CPT

## 2022-05-17 PROCEDURE — 36415 COLL VENOUS BLD VENIPUNCTURE: CPT

## 2022-05-17 PROCEDURE — 70551 MRI BRAIN STEM W/O DYE: CPT

## 2022-05-17 PROCEDURE — 2580000003 HC RX 258: Performed by: INTERNAL MEDICINE

## 2022-05-17 PROCEDURE — 2060000000 HC ICU INTERMEDIATE R&B

## 2022-05-17 PROCEDURE — 80076 HEPATIC FUNCTION PANEL: CPT

## 2022-05-17 PROCEDURE — 99222 1ST HOSP IP/OBS MODERATE 55: CPT | Performed by: NEUROLOGICAL SURGERY

## 2022-05-17 PROCEDURE — 93005 ELECTROCARDIOGRAM TRACING: CPT | Performed by: STUDENT IN AN ORGANIZED HEALTH CARE EDUCATION/TRAINING PROGRAM

## 2022-05-17 PROCEDURE — 6370000000 HC RX 637 (ALT 250 FOR IP): Performed by: STUDENT IN AN ORGANIZED HEALTH CARE EDUCATION/TRAINING PROGRAM

## 2022-05-17 PROCEDURE — 6360000002 HC RX W HCPCS: Performed by: STUDENT IN AN ORGANIZED HEALTH CARE EDUCATION/TRAINING PROGRAM

## 2022-05-17 PROCEDURE — 85025 COMPLETE CBC W/AUTO DIFF WBC: CPT

## 2022-05-17 PROCEDURE — 87086 URINE CULTURE/COLONY COUNT: CPT

## 2022-05-17 PROCEDURE — 83550 IRON BINDING TEST: CPT

## 2022-05-17 PROCEDURE — 71045 X-RAY EXAM CHEST 1 VIEW: CPT

## 2022-05-17 PROCEDURE — 82728 ASSAY OF FERRITIN: CPT

## 2022-05-17 RX ORDER — CALCIUM GLUCONATE 20 MG/ML
1000 INJECTION, SOLUTION INTRAVENOUS ONCE
Status: COMPLETED | OUTPATIENT
Start: 2022-05-17 | End: 2022-05-17

## 2022-05-17 RX ORDER — 0.9 % SODIUM CHLORIDE 0.9 %
1000 INTRAVENOUS SOLUTION INTRAVENOUS ONCE
Status: COMPLETED | OUTPATIENT
Start: 2022-05-17 | End: 2022-05-17

## 2022-05-17 RX ORDER — SENNA AND DOCUSATE SODIUM 50; 8.6 MG/1; MG/1
2 TABLET, FILM COATED ORAL DAILY
Status: DISCONTINUED | OUTPATIENT
Start: 2022-05-17 | End: 2022-05-23

## 2022-05-17 RX ORDER — 0.9 % SODIUM CHLORIDE 0.9 %
30 INTRAVENOUS SOLUTION INTRAVENOUS PRN
Status: DISCONTINUED | OUTPATIENT
Start: 2022-05-17 | End: 2022-05-27 | Stop reason: HOSPADM

## 2022-05-17 RX ORDER — 0.9 % SODIUM CHLORIDE 0.9 %
500 INTRAVENOUS SOLUTION INTRAVENOUS ONCE
Status: COMPLETED | OUTPATIENT
Start: 2022-05-17 | End: 2022-05-17

## 2022-05-17 RX ADMIN — CALCIUM GLUCONATE 1000 MG: 20 INJECTION, SOLUTION INTRAVENOUS at 15:54

## 2022-05-17 RX ADMIN — CARBIDOPA AND LEVODOPA 1 TABLET: 25; 100 TABLET ORAL at 15:56

## 2022-05-17 RX ADMIN — SODIUM CHLORIDE: 9 INJECTION, SOLUTION INTRAVENOUS at 01:08

## 2022-05-17 RX ADMIN — REMDESIVIR 200 MG: 100 INJECTION, POWDER, LYOPHILIZED, FOR SOLUTION INTRAVENOUS at 16:52

## 2022-05-17 RX ADMIN — THIAMINE HYDROCHLORIDE 100 MG: 100 INJECTION, SOLUTION INTRAMUSCULAR; INTRAVENOUS at 08:49

## 2022-05-17 RX ADMIN — DEXTROSE MONOHYDRATE 100 ML/HR: 50 INJECTION, SOLUTION INTRAVENOUS at 06:40

## 2022-05-17 RX ADMIN — SODIUM CHLORIDE, PRESERVATIVE FREE 10 ML: 5 INJECTION INTRAVENOUS at 21:58

## 2022-05-17 RX ADMIN — SODIUM CHLORIDE 500 ML: 9 INJECTION, SOLUTION INTRAVENOUS at 04:39

## 2022-05-17 RX ADMIN — SODIUM CHLORIDE, PRESERVATIVE FREE 10 ML: 5 INJECTION INTRAVENOUS at 08:50

## 2022-05-17 RX ADMIN — CARBIDOPA AND LEVODOPA 1 TABLET: 25; 100 TABLET ORAL at 08:43

## 2022-05-17 RX ADMIN — DOCUSATE SODIUM 50 MG AND SENNOSIDES 8.6 MG 2 TABLET: 8.6; 5 TABLET, FILM COATED ORAL at 12:53

## 2022-05-17 RX ADMIN — SODIUM CHLORIDE 1000 ML: 9 INJECTION, SOLUTION INTRAVENOUS at 09:53

## 2022-05-17 RX ADMIN — CARBIDOPA AND LEVODOPA 1 TABLET: 25; 100 TABLET ORAL at 22:07

## 2022-05-17 ASSESSMENT — PAIN SCALES - GENERAL: PAINLEVEL_OUTOF10: 0

## 2022-05-17 ASSESSMENT — ENCOUNTER SYMPTOMS: TACHYPNEA: 1

## 2022-05-17 NOTE — PLAN OF CARE
Problem: Discharge Planning  Goal: Discharge to home or other facility with appropriate resources  5/16/2022 2208 by Cristopher Bingham RN  Outcome: Progressing  5/16/2022 1825 by Willow Dickson RN  Outcome: Progressing     Problem: Skin/Tissue Integrity  Goal: Absence of new skin breakdown  Description: 1. Monitor for areas of redness and/or skin breakdown  2. Assess vascular access sites hourly  3. Every 4-6 hours minimum:  Change oxygen saturation probe site  4. Every 4-6 hours:  If on nasal continuous positive airway pressure, respiratory therapy assess nares and determine need for appliance change or resting period. 5/16/2022 2208 by Cristopher Bingham RN  Outcome: Progressing  5Problem: Safety - Adult  Goal: Free from fall injury  5/16/2022 2208 by Cristopher Bingham RN  Outcome: Progressing  Problem: Confusion  Goal: Confusion, delirium, dementia, or psychosis is improved or at baseline  Description: INTERVENTIONS:  1. Assess for possible contributors to thought disturbance, including medications, impaired vision or hearing, underlying metabolic abnormalities, dehydration, psychiatric diagnoses, and notify attending LIP  2. [facility] high risk fall precautions, as indicated  3. Provide frequent short contacts to provide reality reorientation, refocusing and direction  4. Decrease environmental stimuli, including noise as appropriate  5. Monitor and intervene to maintain adequate nutrition, hydration, elimination, sleep and activity  6. If unable to ensure safety without constant attention obtain sitter and review sitter guidelines with assigned personnel  7.  Initiate Psychosocial CNS and Spiritual Care consult, as indicated  5/16/2022 2208 by Cristopher Bingham RN  Outcome: Progressing  Flowsheets (Taken 5/16/2022 2000)  Effect of thought disturbance (confusion, delirium, dementia, or psychosis) are managed with adequate functional status:   Assess for contributors to thought disturbance, including medications, impaired vision or hearing, underlying metabolic abnormalities, dehydration, psychiatric diagnoses, notify [place] high risk fall precautions, as indicated   Provide frequent short contacts to provide reality reorientation, refocusing and direction   Decrease environmental stimuli, including noise as appropriate   Monitor and intervene to maintain adequate nutrition, hydration, elimination, sleep and activity  5

## 2022-05-17 NOTE — PROGRESS NOTES
[facility]  Internal Medicine Teaching Residency Program  Inpatient Daily Progress Note  ______________________________________________________________________________    Patient: Freedom Greco  YOB: 1945   GJO:[id number]    Acct: [de-identified]     Room: [facility]  Admit date: 5/15/2022  Today's date: 05/17/22  Number of days in the hospital: 2    SUBJECTIVE   Admitting Diagnosis: Weakness  CC: Syncope  Pt examined at bedside. Chart & results reviewed. Was hypotensive overnight 93/56, febrile to 101.5  COVID-positive   mL/24  NS bolus 1 L IV  Modified barium swallow study  UA reflex to UC, BC pending  MRI cervical spine without contrast showing: Moderate spinal canal stenosis and severe bilateral neural foramina narrowing at C3-C4, C4-C5. Follow-up on neurology recommendations    ROS:  Constitutional: Positive for chills, fevers, sweats  Respiratory:  negative for cough, dyspnea on exertion, hemoptysis, shortness of breath, wheezing  Cardiovascular:  negative for chest pain, chest pressure/discomfort, lower extremity edema, palpitations  Gastrointestinal:  negative for abdominal pain, constipation, diarrhea, nausea, vomiting  Neurological:  negative for dizziness, headache  BRIEF HISTORY     The patient is a pleasant 76 y. o. male with past medical history signifiacnt for parkinsonism disease, hypertension and hyperlipidemia presents through EMS with chief complaint of syncope that occurred just prior to arrival.  History was mostly provided by the caretaker the ER resident. Patient is admitted for syncopal episode work-up. Likely multifactorial due to dysautonomia, orthostatic hypotension, neurocardiogenic, neurogenic due to cervical spinal stenosis and canal narrowing. Cardiology consulted for ruling out cardiogenic causes. Plan for echo.     Dehydration secondary to decreased oral intake causing KALYAN, low blood pressure, orthostatic hypotension. COVID-positive. Will consult ID. OBJECTIVE     Vital Signs:  BP (!) 93/56   Pulse 78   Temp 101.5 °F (38.6 °C) (Temporal)   Resp 20   Ht 5' 9.02\" (1.753 m)   Wt 123 lb (55.8 kg)   SpO2 91%   BMI 18.16 kg/m²     Temp (24hrs), Av.4 °F (37.4 °C), Min:97.1 °F (36.2 °C), Max:101.5 °F (38.6 °C)    In: 3539.6   Out: 325 [Urine:325]    Physical Exam:  Constitutional: This is a well developed, well nourished, 17-18.4 - Mild malnutrition / Protein energy malnutrition Grade I 77y.o. year old male who is AAO x2, cooperative and in no apparent distress. Head:normocephalic and atraumatic. Masked facies. EENT:  PERRLA. No conjunctival injections. Septum was midline, mucosa was without erythema, exudates or cobblestoning. No thrush was noted. Neck: Supple without thyromegaly. No elevated JVP. Trachea was midline. Respiratory: Chest was symmetrical without dullness to percussion. Breath sounds bilaterally were clear to auscultation. There were no wheezes, rhonchi or rales. There is no intercostal retraction or use of accessory muscles. No egophony noted. Cardiovascular: Regular without murmur, clicks, gallops or rubs. Abdomen: Slightly rounded and soft without organomegaly. No rebound, rigidity or guarding was appreciated. Lymphatic: No lymphadenopathy. Musculoskeletal: Normal curvature of the spine. No gross muscle weakness. Extremities:  No lower extremity edema, ulcerations, tenderness, varicosities or erythema. Muscle size, tone and strength are normal.  No involuntary movements are noted. Bilateral upper extremity resting tremors. Skin:  Warm and dry. Good color, turgor and pigmentation. No lesions or scars.   No cyanosis or clubbing  Neurological/Psychiatric: Patient is AAO x2, slow to response    Medications:  Scheduled Medications:    thiamine (VITAMIN B1) IVPB  100 mg IntraVENous Q24H    carbidopa-levodopa  1 tablet Oral TID    sodium chloride flush  5-40 mL IntraVENous 2 times per day    [Held by provider] heparin (porcine)  5,000 Units SubCUTAneous 3 times per day     Continuous Infusions:    dextrose 100 mL/hr (05/17/22 0640)    sodium chloride Stopped (05/17/22 0641)    sodium chloride       PRN Medicationsglucose, 4 tablet, PRN  dextrose bolus, 125 mL, PRN   Or  dextrose bolus, 250 mL, PRN  glucagon (rDNA), 1 mg, PRN  dextrose, 100 mL/hr, PRN  calcium carbonate, 500 mg, TID PRN  sodium chloride flush, 10 mL, PRN  sodium chloride flush, 5-40 mL, PRN  sodium chloride, , PRN  ondansetron, 4 mg, Q8H PRN   Or  ondansetron, 4 mg, Q6H PRN  polyethylene glycol, 17 g, Daily PRN  acetaminophen, 650 mg, Q6H PRN   Or  acetaminophen, 650 mg, Q6H PRN        Diagnostic Labs:  CBC:   Recent Labs     05/15/22  1602 05/16/22  0624 05/17/22  0116   WBC 8.5 5.5 5.6   RBC 3.85* 2.93* 2.90*   HGB 11.5* 8.9* 8.9*   HCT 35.4* 26.7* 26.5*   MCV 91.9 91.1 91.4   RDW 13.5 13.7 13.7    106* 94*     BMP:   Recent Labs     05/15/22  1602 05/16/22  0624 05/17/22  0116    137 136   K 4.6 3.3* 4.0    102 106   CO2 29 26 22   BUN 45* 40* 39*   CREATININE 1.83* 1.73* 1.62*     BNP: No results for input(s): BNP in the last 72 hours. PT/INR: No results for input(s): PROTIME, INR in the last 72 hours. APTT: No results for input(s): APTT in the last 72 hours. CARDIAC ENZYMES: No results for input(s): CKMB, CKMBINDEX, TROPONINI in the last 72 hours. Invalid input(s): CKTOTAL;3  FASTING LIPID PANEL:No results found for: CHOL, HDL, TRIG  LIVER PROFILE:   Recent Labs     05/15/22  1602   AST 42*   ALT 21   BILITOT 0.59   ALKPHOS 55      MICROBIOLOGY: No results found for: CULTURE    Imaging:    CT Head WO Contrast    Result Date: 5/15/2022  Markedly limited exam due to motion artifact. No gross acute intracranial abnormality. CT Cervical Spine WO Contrast    Result Date: 5/15/2022  No acute abnormality of the cervical spine.  Advanced multilevel degenerative disc disease and facet arthropathy. CT Lumbar Spine WO Contrast    Result Date: 5/15/2022  Chest CT angiogram: No evidence of pulmonary embolism or acute pulmonary abnormality. Cholelithiasis. Moderate free fluid within peritoneal cavity may be suggestive of ascites. CT of thoracic spine: No acute osseous abnormality. No fracture. CT of lumbar spine: No acute osseous abnormality. No fracture. Mild levoscoliosis of thoracolumbar junction. Multiple round hypodense lesions are noted throughout the lumbar spine most pronounced within L3 vertebral body. Findings may be related to osteopenia and subchondral cystic change. Marrow infiltrative lesions cannot be completely excluded. RECOMMENDATIONS: Unavailable     MRI LUMBAR SPINE W WO CONTRAST    Result Date: 5/15/2022  The previously noted round hypodense lesions throughout the lumbar spine correspond to subchondral cystic change/degenerative findings. No evidence of marrow infiltrative lesion is noted. Partial visualization of a massive cystic structure within the right side of peritoneal cavity, measuring approximately 14 cm. The lesion does not appear to be arising from the right kidney. If there is need for further evaluation, CT of the abdomen and pelvis can be obtained. At L5-S1, grade 1 anterolisthesis, bilateral pars defects and severe bilateral neural foraminal narrowing RECOMMENDATIONS: Unavailable     XR CHEST PORTABLE    Result Date: 5/15/2022  No acute cardiopulmonary disease. CT CHEST PULMONARY EMBOLISM W CONTRAST    Result Date: 5/15/2022  Chest CT angiogram: No evidence of pulmonary embolism or acute pulmonary abnormality. Cholelithiasis. Moderate free fluid within peritoneal cavity may be suggestive of ascites. CT of thoracic spine: No acute osseous abnormality. No fracture. CT of lumbar spine: No acute osseous abnormality. No fracture. Mild levoscoliosis of thoracolumbar junction.  Multiple round hypodense lesions are noted throughout the lumbar spine most pronounced within L3 vertebral body. Findings may be related to osteopenia and subchondral cystic change. Marrow infiltrative lesions cannot be completely excluded. RECOMMENDATIONS: Unavailable     CT THORACIC SPINE TRAUMA RECONSTRUCTION    Result Date: 5/15/2022  Chest CT angiogram: No evidence of pulmonary embolism or acute pulmonary abnormality. Cholelithiasis. Moderate free fluid within peritoneal cavity may be suggestive of ascites. CT of thoracic spine: No acute osseous abnormality. No fracture. CT of lumbar spine: No acute osseous abnormality. No fracture. Mild levoscoliosis of thoracolumbar junction. Multiple round hypodense lesions are noted throughout the lumbar spine most pronounced within L3 vertebral body. Findings may be related to osteopenia and subchondral cystic change. Marrow infiltrative lesions cannot be completely excluded. RECOMMENDATIONS: Unavailable       ASSESSMENT & PLAN     Principal Problem:    Weakness  Active Problems:    Syncope and collapse    Hypotension    Parkinson's disease ([address]    COVID-19    KALYAN (acute kidney injury) ([address]    Cervical myelopathy (HCC)    Orthostatic hypertension  Resolved Problems:    * No resolved hospital problems. *    1. Syncope and collapse. Multifactorial.  Likely 2/2 orthostatic hypotension 2/2 dysautonomia due to Parkinson's disease/dehydration vs cervical myelopathy vs cardiogenic vs COVID infection. MRI cervical spine shows cervical canal narrowing. Plan for echo. NS 1 L bolus. Monitor BP. Follow on neuro and cardio recomnds  2. Parkinson's disease. Continue on Sinemet 1 tablet 3 times daily. Follow-up on neurology recommendation  3. COVID-19 infection, currently not requiring supplemental O2. Airborne precautions. Consult ID  4. KALYAN. NS bolus 1 L. Continue on NS 75 mL/h. Monitor output  5. Orthostatic hypotension. Continue on maintenance fluid. Walk with assistance. Fall precautions.   Follow-up on cardiology recommendations. 6. Chronic alcoholism. Multivitamin and folate supplementation. DVT ppx : None, heparin held by neurology  GI ppx: Tums    PT/OT: Following  Discharge Planning / SW: CM to assist with    Andre Barnett MD  Internal Medicine Resident, PGY-1  [address], [place]  5/17/2022, 7:02 AM Attending Physician Statement  I have discussed the care of Reddy Higginbotham, including pertinent history and exam findings,  with the resident. I have seen and examined the patient and the key elements of all parts of the encounter have been performed by me. I agree with the assessment, plan and orders as documented by the resident with additions . Treatment plan Discussed with nursing staff in detail , all questions answered . Electronically signed by Kyaw Cat MD on   5/17/22 at 8:33 PM EDT    Please note that this chart was generated using voice recognition Dragon dictation software. Although every effort was made to ensure the accuracy of this automated transcription, some errors in transcription may have occurred.

## 2022-05-17 NOTE — PROGRESS NOTES
[facility]   [address] [place] [address]    Progress note             Date:   5/17/2022  Patient name:  Karma Salinas  Date of admission:  5/15/2022  3:39 PM  MRN:   [id number]  Account:  [de-identified]  YOB: 1945  PCP:    No primary care provider on file. Room:   [address] [place] [address]  Code Status:    Full Code    Chief Complaint:     Chief Complaint   Patient presents with    Loss of Consciousness       Interval hx: The Patient was seen and examined at bedside  Is vitally stable alert oriented x2 not to time  Fever overnight, infectious workup pending. The patient stated that he is improving. The upper extr rest tremor with dramatic improvement. MRI B came back neg    Brief History of Present Illness: The patient is a 77 y.o. Non- / non  male who presents with Loss of Consciousness   and he is admitted to the hospital for the management of syncope. Hx of Parkinson dis not on meds, ETOH abuse, came with syncope. Cardiology consulted. Sinemet started with improvement. Fever, infectious workup pending. Orthostatics. Past Medical History:     No past medical history on file. Past Surgical History:     No past surgical history on file. Medications Prior to Admission:     Prior to Admission medications    Not on File        Allergies:     Patient has no known allergies. Social History:     Tobacco:    has no history on file for tobacco use. Alcohol:      has no history on file for alcohol use. Drug Use:  has no history on file for drug use. Family History:     No family history on file.     Review of Systems:     ROS:  Constitutional  Negative for fever and chills    HEENT  Negative for ear discharge, ear pain, nosebleed    Eyes  Negative for photophobia, pain and discharge    Respiratory  Negative for hemoptysis and sputum    Cardiovascular  Negative for orthopnea, claudication and PND    Gastrointestinal  Negative for abdominal pain, diarrhea, blood in stool    Musculoskeletal  Negative for joint pain, negative for myalgia    Neurology syncope   Skin  Negative for rash or itching    Endo/heme/allergies  Negative for polydipsia, environmental allergy    Psychiatric/behavioral  Negative for suicidal ideation. Patient is not anxious        Physical Exam:   BP (!) 102/55   Pulse 65   Temp 99.2 °F (37.3 °C) (Oral)   Resp 23   Ht 5' 9.02\" (1.753 m)   Wt 123 lb (55.8 kg)   SpO2 91%   BMI 18.16 kg/m²   Temp (24hrs), Av.3 °F (37.4 °C), Min:97.1 °F (36.2 °C), Max:101.5 °F (38.6 °C)    Recent Labs     22  0105 22  0601 22  0618 22  1208   POCGLU 78 70* 69* 84       Intake/Output Summary (Last 24 hours) at 2022 1250  Last data filed at 2022 1113  Gross per 24 hour   Intake 2702.91 ml   Output 325 ml   Net 2377.91 ml         NEUROLOGIC EXAMINATION  GENERAL  Appears comfortable and in no distress   HEENT  NC/ AT   NECK  Supple and no bruits heard   MENTAL STATUS:  Alert, oriented x2 not to time, mild dysarthria, normal language, no hallucination or delusion   CRANIAL NERVES: II     -      Visual fields intact to confrontation  III,IV,VI -  EOMs full, no afferent defect, no POLI, no ptosis  V     -     Normal facial sensation  VII    -     Normal facial symmetry  VIII   -     Intact hearing  IX,X -     Symmetrical palate  XI    -     Symmetrical shoulder shrug  XII   -     Midline tongue, no atrophy    MOTOR FUNCTION:  The patient was able to antigravity both upper and lower extremity with significant strength, dramatic improvement in the resting tremor mild with moderate cogwheel rigidity bilaterally more on the right side, tremor on the chin.    SENSORY FUNCTION:  Withdraw to pain in all extremities   CEREBELLAR FUNCTION:  Intact fine motor control over upper limbs   REFLEX FUNCTION:  Hyperreflexic in the right side   STATION and GAIT  Not tested       Investigations:      Laboratory Testing:  Recent Results (from the past 24 hour(s))   Troponin    Collection Time: 05/16/22  1:25 PM   Result Value Ref Range    Troponin, High Sensitivity 63 (HH) 0 - 22 ng/L   Troponin    Collection Time: 05/16/22  7:34 PM   Result Value Ref Range    Troponin, High Sensitivity 65 (HH) 0 - 22 ng/L   POC Glucose Fingerstick    Collection Time: 05/17/22  1:05 AM   Result Value Ref Range    POC Glucose 78 75 - 110 mg/dL   Basic Metabolic Panel w/ Reflex to MG    Collection Time: 05/17/22  1:16 AM   Result Value Ref Range    Glucose 74 70 - 99 mg/dL    BUN 39 (H) 8 - 23 mg/dL    CREATININE 1.62 (H) 0.70 - 1.20 mg/dL    Calcium 7.3 (L) 8.6 - 10.4 mg/dL    Sodium 136 135 - 144 mmol/L    Potassium 4.0 3.7 - 5.3 mmol/L    Chloride 106 98 - 107 mmol/L    CO2 22 20 - 31 mmol/L    Anion Gap 8 (L) 9 - 17 mmol/L    GFR Non-African American 42 (L) >60 mL/min    GFR  51 (L) >60 mL/min    GFR Comment         CBC with Auto Differential    Collection Time: 05/17/22  1:16 AM   Result Value Ref Range    WBC 5.6 3.5 - 11.3 k/uL    RBC 2.90 (L) 4.21 - 5.77 m/uL    Hemoglobin 8.9 (L) 13.0 - 17.0 g/dL    Hematocrit 26.5 (L) 40.7 - 50.3 %    MCV 91.4 82.6 - 102.9 fL    MCH 30.7 25.2 - 33.5 pg    MCHC 33.6 28.4 - 34.8 g/dL    RDW 13.7 11.8 - 14.4 %    Platelets 94 (L) 577 - 453 k/uL    MPV 12.6 8.1 - 13.5 fL    NRBC Automated 0.0 0.0 per 100 WBC    Seg Neutrophils 72 (H) 36 - 65 %    Lymphocytes 18 (L) 24 - 43 %    Monocytes 10 3 - 12 %    Eosinophils % 0 (L) 1 - 4 %    Basophils 0 0 - 2 %    Immature Granulocytes 0 0 %    Segs Absolute 4.00 1.50 - 8.10 k/uL    Absolute Lymph # 1.01 (L) 1.10 - 3.70 k/uL    Absolute Mono # 0.55 0.10 - 1.20 k/uL    Absolute Eos # <0.03 0.00 - 0.44 k/uL    Basophils Absolute <0.03 0.00 - 0.20 k/uL    Absolute Immature Granulocyte <0.03 0.00 - 0.30 k/uL   Troponin    Collection Time: 05/17/22  1:16 AM   Result Value Ref Range    Troponin, High Sensitivity 64 (HH) 0 - 22 ng/L   Reticulocytes    Collection Time: 05/17/22 Medium    Weakness [R53.1] 05/15/2022     Priority: Medium    Syncope and collapse [R55] 05/15/2022     Priority: Medium       Plan:     81-year-old male patient with Parkinson disease not on medication presented to the hospital with syncope. 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 Started on Sinemet with dramatic improvement in the resting tremor. 2.  MRI of the brain is unremarkable  3. Cardiology was consulted, follow-up recommendations. 4.  Fever, was found to be COVID-positive. Medical treatment per primary team.  5.  PT/OT        Follow-up further recommendations after discussing the case with attending  The plan was discussed with the patient, patient's family and the medical staff. Consultations:   IP CONSULT TO NEUROLOGY  IP CONSULT TO CARDIOLOGY  IP CONSULT TO NEUROSURGERY  IP CONSULT TO INTERNAL MEDICINE  IP CONSULT TO CASE MANAGEMENT  IP CONSULT TO DIETITIAN    Patient is admitted as inpatient status because of co-morbidities listed above, severity of signs and symptoms as outlined, requirement for current medical therapies and most importantly because of direct risk to patient if care not provided in a hospital setting. Jacqueline Araujo MD  5/17/2022  12:50 PM    Copy sent to Dr. Figueroa primary care provider on file.

## 2022-05-17 NOTE — PLAN OF CARE
Problem: Skin/Tissue Integrity  Goal: Absence of new skin breakdown  Description: 1. Monitor for areas of redness and/or skin breakdown  2. Assess vascular access sites hourly  3. Every 4-6 hours minimum:  Change oxygen saturation probe site  4. Every 4-6 hours:  If on nasal continuous positive airway pressure, respiratory therapy assess nares and determine need for appliance change or resting period. Outcome: Progressing     Problem: Safety - Adult  Goal: Free from fall injury  Outcome: Progressing  Flowsheets (Taken 5/17/2022 1114)  Free From Fall Injury:   Instruct family/caregiver on patient safety   Based on caregiver fall risk screen, instruct family/caregiver to ask for assistance with transferring infant if caregiver noted to have fall risk factors     Problem: Confusion  Goal: Confusion, delirium, dementia, or psychosis is improved or at baseline  Description: INTERVENTIONS:  1. Assess for possible contributors to thought disturbance, including medications, impaired vision or hearing, underlying metabolic abnormalities, dehydration, psychiatric diagnoses, and notify attending LIP  2. [place] high risk fall precautions, as indicated  3. Provide frequent short contacts to provide reality reorientation, refocusing and direction  4. Decrease environmental stimuli, including noise as appropriate  5. Monitor and intervene to maintain adequate nutrition, hydration, elimination, sleep and activity  6. If unable to ensure safety without constant attention obtain sitter and review sitter guidelines with assigned personnel  7.  Initiate Psychosocial CNS and Spiritual Care consult, as indicated  Outcome: Progressing

## 2022-05-17 NOTE — CARE COORDINATION
Transitional Planning    Received call from pt's cousin, Kimo Rios. She states that she spoke with pt's [facility] , Alvarez Vazquez [phone], pt is not service connected for SNF benefits. Pt has no other insurance benefits. Will have to apply for medicaid. Mikemartha Blanca states pt's monthly income is $1600/month and only asset is an old truck. Spoke with Niall Loving. He will contact patient.

## 2022-05-17 NOTE — PROCEDURES
INSTRUMENTAL SWALLOW REPORT  MODIFIED BARIUM SWALLOW    NAME: Shelly England   : 1945  MRN: [id number]       Date of Eval: 2022              Referring Diagnosis(es):      Past Medical History:  has no past medical history on file. Past Surgical History:  has no past surgical history on file. Type of Study: Initial MBS         Patient Complaints/Reason for Referral:  Shelly England was referred for a MBS to assess the efficiency of his/her swallow function, assess for aspiration, and to make recommendations regarding safe dietary consistencies, effective compensatory strategies, and safe eating environment. Onset of problem:     The patient is a pleasant 77 y.o. male with past medical history signifiacnt for parkinsonism disease, hypertension and hyperlipidemia presents through EMS with chief complaint of syncope that occurred just prior to arrival.  History was mostly provided by the caretaker the ER resident.      Report patient is currently at his baseline, was diagnosed with Parkinson's but not on treatment as he was far too progressed in treatment would not be helpful. He normally ambulates at home without significant difficulty but started having lower extremity weakness in the last several weeks. On 5/15/2022 when he was on the toilet he was unable to get back due to weakness, required lift assist.  Today his cousin was at his house helping clean the linens as he wet the bed which was new for him. She states he was sitting in the wheeled walker next to the bed and he became unresponsive to voice and sternal rub. No abnormal shaking or body movements at that time she was unable to wake him and called EMS. He remained unresponsive until they were in route-approximately 5 minutes, and started waking up just prior to arrival.  This is now the second time this has occurred. The first time was 2022 and reportedly had immediate recovery after the episode without confusion. There was bowel and bladder incontinence at that time. No seizure activity. His initial blood pressure was decreased but reading was hypertensive in the emergency room. Patient was unable to give a clear history other than saying that his back was hurting and his legs were weak.     Longstanding heavy drinker, usually goes to the bar regularly for 2 to 4 weeks ago became too weak and stopped driving, has not drank since then, brother states that there is no alcohol in the home. Family reports bilateral upper extremity tremors for over 10 years. Behavior/Cognition/Vision/Hearing:  Behavior/Cognition: Alert; Cooperative  Vision: Impaired  Hearing: Exceptions to [facility]    Impressions:  Patient presents with safe swallow for Dysphagia Minced and Moist (Dysphagia II)  diet with Moderately Thick (Honey) liquids as evidenced by no observed aspiration noted with consistencies tested. + deep penetration with thin and nectar trials. Recommend small sips and bites, only feed when alert and awake and upright at 90 degrees for all PO intake. Recommend close monitoring for overt/clinical s/s of aspiration and D/C PO intake and complete Modified Barium Swallow Study should they occur. Results and recommendations reported to RN. Patient Position: Lateral         Dysphagia Outcome Severity Scale: Level 3: Moderate dysphagia- Total assisstance, supervision or strategies. Two or more diet consistencies restricted  Penetration-Aspiration Scale (PAS): 2 - Material enters the airway, remains above the vocal folds, and is ejected from the airway    Recommended Diet:  Solid consistency: Minced and Moist  Liquid consistency: Moderately Thick  Liquid administration via: Spoon;Cup    Medication administration: Meds in puree    Safe Swallow Protocol:     Compensatory Swallowing Strategies : Alternate solids and liquids;Eat/Feed slowly;Upright as possible for all oral intake; Other (comments); Small bites/sips; No straws      Recommendations/Treatment  Requires SLP Intervention: Yes        D/C Recommendations: Ongoing speech therapy is recommended during this hospitalization; Ongoing speech therapy is recommended at next level of care  Postural Changes and/or Swallow Maneuvers: Upright 90 degrees      Recommended Exercises:    Therapeutic Interventions: Diet tolerance monitoring;Oral motor exercises; Pharyngeal exercises; Laryngeal exercises         Education: Images and recommendations were reviewed with pt following this exam.   Patient Education: yes  Patient Education Response: Verbalizes understanding           Goals:    Long Term:     To Maximize safety with intake, optimize nutrition/hydration and minimize risk for aspiration. Short Term:  Goals: The patient will tolerate recommended diet without observed clinical signs of aspiration      Oral Preparation / Oral Phase   Extended mastication with soft solids, with decreased oral manipulation, otherwise fucntional    Pharyngeal Phase: Impaired  Puree: No penetration and no aspiration with mild vallec/pyriform stasis. Soft Solids: No penetration and no aspiration with mild vallec/pyriform stasis. Thick nectar and Honey spoon: no penetration with no aspiration and no aspiration min stasis. Nectar and thin straw: + deep penetration with no aspiration min stasis + stasis in the laryngeal vestibule.          Esophageal Phase  Esophageal Screen: WFL        Pain      Pain Level:  (mandeep)      Therapy Time:   Individual Concurrent Group Co-treatment   Time In  1500         Time Out  1515         Minutes  15                   SYLVIE Major, 5/17/2022, 3:31 PM

## 2022-05-17 NOTE — PROGRESS NOTES
[facility]  Progress Note                   Date:   5/17/2022  Patient name: Juli Pineda  Date of admission:  5/15/2022  3:39 PM  MRN:   [id number]  YOB: 1945  PCP: No primary care provider on file. Reason for Admission: Syncope and collapse [R55]  Weakness [R53.1]    Subjective:       Clinical Changes /Abnormalities: Patient seen and examined. Denies chest pain or shortness of breath. Tele/vitals/labs reviewed . Discussed case with RN. Review of Systems    Medications:   Scheduled Meds:   sennosides-docusate sodium  2 tablet Oral Daily    sodium chloride  1,000 mL IntraVENous Once    calcium gluconate-NaCl  1,000 mg IntraVENous Once    thiamine (VITAMIN B1) IVPB  100 mg IntraVENous Q24H    carbidopa-levodopa  1 tablet Oral TID    sodium chloride flush  5-40 mL IntraVENous 2 times per day    [Held by provider] heparin (porcine)  5,000 Units SubCUTAneous 3 times per day     Continuous Infusions:   dextrose 100 mL/hr (05/17/22 0714)    sodium chloride Stopped (05/17/22 0639)    sodium chloride       CBC:   Recent Labs     05/15/22  1602 05/16/22 0624 05/17/22  0116   WBC 8.5 5.5 5.6   HGB 11.5* 8.9* 8.9*    106* 94*     BMP:    Recent Labs     05/15/22  1602 05/16/22  0624 05/17/22  0116    137 136   K 4.6 3.3* 4.0    102 106   CO2 29 26 22   BUN 45* 40* 39*   CREATININE 1.83* 1.73* 1.62*   GLUCOSE 97 70 74     Hepatic:  Recent Labs     05/15/22  1602   AST 42*   ALT 21   BILITOT 0.59   ALKPHOS 55     Troponin:   Recent Labs     05/16/22  1934 05/17/22  0116 05/17/22  0739   TROPHS 65* 64* 58*     BNP: No results for input(s): BNP in the last 72 hours. Lipids: No results for input(s): CHOL, HDL in the last 72 hours. Invalid input(s): LDLCALCU  INR: No results for input(s): INR in the last 72 hours.        Cardiac history,    EKG: normal sinus rhythm with some lateral ischemic ST   No Stress test, Echo or cardiac cath on Epic    Objective: Vitals: BP (!) 93/56   Pulse 78   Temp 101.5 °F (38.6 °C) (Temporal)   Resp 20   Ht 5' 9.02\" (1.753 m)   Wt 123 lb (55.8 kg)   SpO2 91%   BMI 18.16 kg/m²   General appearance: alert and cooperative with exam  HEENT: Head: Normocephalic, no lesions, without obvious abnormality. Neck:no JVD, trachea midline, no adenopathy  Lungs: Clear to auscultation  Heart: Regular rate and rhythm, s1/s2 auscultated, no murmurs  Abdomen: soft, non-tender, bowel sounds active  Extremities: no edema  Neurologic: not done        Assessment / Acute Cardiac Problems:   1. History of syncope multifactorial, possible vasovagal, arrhythmogenic, orthostatic changes  1. . Troponin 59-->55-->62  2. KALYAN  3. HTN  4. HLD  5. Parkinson's disease  6. Chronic alcohol abuse       Patient Active Problem List:     Weakness     Syncope and collapse     Orthostatic hypotension     Parkinson's disease ([facility] [address]      Plan of Treatment:   1. Flat troponin likely secondary to KALYAN/ dehydration   2. Awaiting echo to rule out wall motion abnormality or structural heart disease  3.  Holter monitoring as outpatient to rule out cardiogenic cause of syncope    Electronically signed by ANSLEY Lloyd NP on 5/17/2022 at 9:21 [address] [place] [address].  [phone]

## 2022-05-18 PROBLEM — D50.9 IRON DEFICIENCY ANEMIA: Status: ACTIVE | Noted: 2022-05-18

## 2022-05-18 LAB
-: NORMAL
ABSOLUTE EOS #: 0.07 K/UL (ref 0–0.44)
ABSOLUTE IMMATURE GRANULOCYTE: <0.03 K/UL (ref 0–0.3)
ABSOLUTE LYMPH #: 1.06 K/UL (ref 1.1–3.7)
ABSOLUTE MONO #: 0.43 K/UL (ref 0.1–1.2)
ALBUMIN SERPL-MCNC: 2.4 G/DL (ref 3.5–5.2)
ALBUMIN/GLOBULIN RATIO: 1.2 (ref 1–2.5)
ALLEN TEST: POSITIVE
ALP BLD-CCNC: 37 U/L (ref 40–129)
ALT SERPL-CCNC: 8 U/L (ref 5–41)
ANION GAP SERPL CALCULATED.3IONS-SCNC: 8 MMOL/L (ref 9–17)
AST SERPL-CCNC: 40 U/L
BASOPHILS # BLD: 1 % (ref 0–2)
BASOPHILS ABSOLUTE: <0.03 K/UL (ref 0–0.2)
BILIRUB SERPL-MCNC: 0.16 MG/DL (ref 0.3–1.2)
BILIRUBIN DIRECT: <0.08 MG/DL
BILIRUBIN URINE: NEGATIVE
BILIRUBIN, INDIRECT: ABNORMAL MG/DL (ref 0–1)
BUN BLDV-MCNC: 28 MG/DL (ref 8–23)
C-REACTIVE PROTEIN: 10.2 MG/L (ref 0–5)
CALCIUM SERPL-MCNC: 7.1 MG/DL (ref 8.6–10.4)
CHLORIDE BLD-SCNC: 104 MMOL/L (ref 98–107)
CO2: 21 MMOL/L (ref 20–31)
COLOR: YELLOW
CREAT SERPL-MCNC: 1.27 MG/DL (ref 0.7–1.2)
D-DIMER QUANTITATIVE: 1.45 MG/L FEU
EOSINOPHILS RELATIVE PERCENT: 2 % (ref 1–4)
EPITHELIAL CELLS UA: NORMAL /HPF (ref 0–5)
FERRITIN: 236 NG/ML (ref 30–400)
FIO2: 4
GFR AFRICAN AMERICAN: >60 ML/MIN
GFR NON-AFRICAN AMERICAN: 55 ML/MIN
GFR SERPL CREATININE-BSD FRML MDRD: ABNORMAL ML/MIN/{1.73_M2}
GLUCOSE BLD-MCNC: 125 MG/DL (ref 75–110)
GLUCOSE BLD-MCNC: 82 MG/DL (ref 75–110)
GLUCOSE BLD-MCNC: 86 MG/DL (ref 70–99)
GLUCOSE URINE: ABNORMAL
HCT VFR BLD CALC: 27.9 % (ref 40.7–50.3)
HEMOGLOBIN: 9.4 G/DL (ref 13–17)
IMMATURE GRANULOCYTES: 1 %
KETONES, URINE: NEGATIVE
LACTATE DEHYDROGENASE: 219 U/L (ref 135–225)
LEUKOCYTE ESTERASE, URINE: NEGATIVE
LV EF: 55 %
LVEF MODALITY: NORMAL
LYMPHOCYTES # BLD: 24 % (ref 24–43)
MAGNESIUM: 1.7 MG/DL (ref 1.6–2.6)
MCH RBC QN AUTO: 30.8 PG (ref 25.2–33.5)
MCHC RBC AUTO-ENTMCNC: 33.7 G/DL (ref 28.4–34.8)
MCV RBC AUTO: 91.5 FL (ref 82.6–102.9)
MONOCYTES # BLD: 10 % (ref 3–12)
NEGATIVE BASE EXCESS, ART: 3 (ref 0–2)
NITRITE, URINE: NEGATIVE
NRBC AUTOMATED: 0 PER 100 WBC
O2 DEVICE/FLOW/%: ABNORMAL
PATHOLOGIST REVIEW: NORMAL
PDW BLD-RTO: 13.9 % (ref 11.8–14.4)
PH UA: 5 (ref 5–8)
PLATELET # BLD: 87 K/UL (ref 138–453)
PMV BLD AUTO: 12.8 FL (ref 8.1–13.5)
POC HCO3: 20.9 MMOL/L (ref 21–28)
POC O2 SATURATION: 91 % (ref 94–98)
POC PCO2: 30.7 MM HG (ref 35–48)
POC PH: 7.44 (ref 7.35–7.45)
POC PO2: 58.4 MM HG (ref 83–108)
POTASSIUM SERPL-SCNC: 3.6 MMOL/L (ref 3.7–5.3)
PROTEIN UA: ABNORMAL
RBC # BLD: 3.05 M/UL (ref 4.21–5.77)
RBC UA: NORMAL /HPF (ref 0–4)
SAMPLE SITE: ABNORMAL
SEG NEUTROPHILS: 62 % (ref 36–65)
SEGMENTED NEUTROPHILS ABSOLUTE COUNT: 2.75 K/UL (ref 1.5–8.1)
SODIUM BLD-SCNC: 133 MMOL/L (ref 135–144)
SPECIFIC GRAVITY UA: 1.02 (ref 1–1.03)
SURGICAL PATHOLOGY REPORT: NORMAL
TOTAL PROTEIN: 4.4 G/DL (ref 6.4–8.3)
TURBIDITY: CLEAR
URINE HGB: ABNORMAL
UROBILINOGEN, URINE: NORMAL
WBC # BLD: 4.4 K/UL (ref 3.5–11.3)
WBC UA: NORMAL /HPF (ref 0–5)

## 2022-05-18 PROCEDURE — 80076 HEPATIC FUNCTION PANEL: CPT

## 2022-05-18 PROCEDURE — 83735 ASSAY OF MAGNESIUM: CPT

## 2022-05-18 PROCEDURE — 86140 C-REACTIVE PROTEIN: CPT

## 2022-05-18 PROCEDURE — 2580000003 HC RX 258: Performed by: STUDENT IN AN ORGANIZED HEALTH CARE EDUCATION/TRAINING PROGRAM

## 2022-05-18 PROCEDURE — 6370000000 HC RX 637 (ALT 250 FOR IP): Performed by: STUDENT IN AN ORGANIZED HEALTH CARE EDUCATION/TRAINING PROGRAM

## 2022-05-18 PROCEDURE — 82803 BLOOD GASES ANY COMBINATION: CPT

## 2022-05-18 PROCEDURE — 85025 COMPLETE CBC W/AUTO DIFF WBC: CPT

## 2022-05-18 PROCEDURE — 99232 SBSQ HOSP IP/OBS MODERATE 35: CPT | Performed by: NEUROLOGICAL SURGERY

## 2022-05-18 PROCEDURE — 82947 ASSAY GLUCOSE BLOOD QUANT: CPT

## 2022-05-18 PROCEDURE — 36415 COLL VENOUS BLD VENIPUNCTURE: CPT

## 2022-05-18 PROCEDURE — 2580000003 HC RX 258

## 2022-05-18 PROCEDURE — 6360000002 HC RX W HCPCS: Performed by: STUDENT IN AN ORGANIZED HEALTH CARE EDUCATION/TRAINING PROGRAM

## 2022-05-18 PROCEDURE — 2500000003 HC RX 250 WO HCPCS: Performed by: INTERNAL MEDICINE

## 2022-05-18 PROCEDURE — APPSS15 APP SPLIT SHARED TIME 0-15 MINUTES: Performed by: NURSE PRACTITIONER

## 2022-05-18 PROCEDURE — 82728 ASSAY OF FERRITIN: CPT

## 2022-05-18 PROCEDURE — 99232 SBSQ HOSP IP/OBS MODERATE 35: CPT | Performed by: PSYCHIATRY & NEUROLOGY

## 2022-05-18 PROCEDURE — 2060000000 HC ICU INTERMEDIATE R&B

## 2022-05-18 PROCEDURE — 83615 LACTATE (LD) (LDH) ENZYME: CPT

## 2022-05-18 PROCEDURE — 80048 BASIC METABOLIC PNL TOTAL CA: CPT

## 2022-05-18 PROCEDURE — 8E0ZXY6 ISOLATION: ICD-10-PCS | Performed by: INTERNAL MEDICINE

## 2022-05-18 PROCEDURE — 93306 TTE W/DOPPLER COMPLETE: CPT

## 2022-05-18 PROCEDURE — 81001 URINALYSIS AUTO W/SCOPE: CPT

## 2022-05-18 PROCEDURE — 99232 SBSQ HOSP IP/OBS MODERATE 35: CPT | Performed by: INTERNAL MEDICINE

## 2022-05-18 PROCEDURE — 2580000003 HC RX 258: Performed by: INTERNAL MEDICINE

## 2022-05-18 PROCEDURE — 85379 FIBRIN DEGRADATION QUANT: CPT

## 2022-05-18 PROCEDURE — 99222 1ST HOSP IP/OBS MODERATE 55: CPT | Performed by: INTERNAL MEDICINE

## 2022-05-18 PROCEDURE — 36600 WITHDRAWAL OF ARTERIAL BLOOD: CPT

## 2022-05-18 RX ORDER — MIDODRINE HYDROCHLORIDE 5 MG/1
5 TABLET ORAL ONCE
Status: COMPLETED | OUTPATIENT
Start: 2022-05-18 | End: 2022-05-18

## 2022-05-18 RX ORDER — ERGOCALCIFEROL 1.25 MG/1
50000 CAPSULE ORAL WEEKLY
Status: DISCONTINUED | OUTPATIENT
Start: 2022-05-18 | End: 2022-05-26

## 2022-05-18 RX ORDER — 0.9 % SODIUM CHLORIDE 0.9 %
250 INTRAVENOUS SOLUTION INTRAVENOUS ONCE
Status: DISCONTINUED | OUTPATIENT
Start: 2022-05-18 | End: 2022-05-23

## 2022-05-18 RX ORDER — 0.9 % SODIUM CHLORIDE 0.9 %
500 INTRAVENOUS SOLUTION INTRAVENOUS ONCE
Status: COMPLETED | OUTPATIENT
Start: 2022-05-18 | End: 2022-05-18

## 2022-05-18 RX ADMIN — SODIUM CHLORIDE 250 ML: 9 INJECTION, SOLUTION INTRAVENOUS at 20:04

## 2022-05-18 RX ADMIN — THIAMINE HYDROCHLORIDE 100 MG: 100 INJECTION, SOLUTION INTRAMUSCULAR; INTRAVENOUS at 11:24

## 2022-05-18 RX ADMIN — REMDESIVIR 100 MG: 100 INJECTION, POWDER, LYOPHILIZED, FOR SOLUTION INTRAVENOUS at 17:04

## 2022-05-18 RX ADMIN — CARBIDOPA AND LEVODOPA 1 TABLET: 25; 100 TABLET ORAL at 09:00

## 2022-05-18 RX ADMIN — DEXTROSE MONOHYDRATE 100 ML/HR: 50 INJECTION, SOLUTION INTRAVENOUS at 05:22

## 2022-05-18 RX ADMIN — CARBIDOPA AND LEVODOPA 1 TABLET: 25; 100 TABLET ORAL at 14:06

## 2022-05-18 RX ADMIN — MIDODRINE HYDROCHLORIDE 5 MG: 5 TABLET ORAL at 20:58

## 2022-05-18 RX ADMIN — CARBIDOPA AND LEVODOPA 1 TABLET: 25; 100 TABLET ORAL at 20:58

## 2022-05-18 RX ADMIN — ERGOCALCIFEROL 50000 UNITS: 1.25 CAPSULE ORAL at 11:21

## 2022-05-18 RX ADMIN — IRON SUCROSE 200 MG: 20 INJECTION, SOLUTION INTRAVENOUS at 10:00

## 2022-05-18 RX ADMIN — SODIUM CHLORIDE: 9 INJECTION, SOLUTION INTRAVENOUS at 17:03

## 2022-05-18 RX ADMIN — DOCUSATE SODIUM 50 MG AND SENNOSIDES 8.6 MG 2 TABLET: 8.6; 5 TABLET, FILM COATED ORAL at 09:52

## 2022-05-18 RX ADMIN — POTASSIUM BICARBONATE 40 MEQ: 782 TABLET, EFFERVESCENT ORAL at 11:19

## 2022-05-18 RX ADMIN — SODIUM CHLORIDE, PRESERVATIVE FREE 10 ML: 5 INJECTION INTRAVENOUS at 20:58

## 2022-05-18 ASSESSMENT — ENCOUNTER SYMPTOMS
ABDOMINAL DISTENTION: 0
COUGH: 1
APNEA: 0
COLOR CHANGE: 0
EYE DISCHARGE: 0

## 2022-05-18 NOTE — PROGRESS NOTES
Neurosurgery SHELLY/Resident    Daily Progress Note   CC:  Chief Complaint   Patient presents with    Loss of Consciousness     5/18/2022  5:03 PM    Chart reviewed. No acute events overnight. No new complaints. Resting in bed, patient seen and examined this afternoon with DR Brianne Mckenna, patient difficult to assess due to mentation, admitted for syncope, MRI cervical spine showing stenosis and T2 cord signal change at C3-4, intermittent fevers since admission    Vitals:    05/18/22 0852 05/18/22 1153 05/18/22 1154 05/18/22 1630   BP: (!) 99/58  (!) 109/58 (!) 133/114   Pulse: 73 67 70 79   Resp: 18 21 17   Temp: 98.7 °F (37.1 °C)  98.5 °F (36.9 °C) 98.8 °F (37.1 °C)   TempSrc: Oral  Oral Oral   SpO2: 92%  94% 97%   Weight:       Height:           PE:   Alert  Hard of hearing  Following commands  5/5 BLE   5/5 BUE minus right shoulder which is limited   Sensation: intact  Positive hoffmans left hand      Lab Results   Component Value Date    WBC 4.4 05/18/2022    HGB 9.4 (L) 05/18/2022    HCT 27.9 (L) 05/18/2022    PLT 87 (L) 05/18/2022    ALT 8 05/18/2022    AST 40 (H) 05/18/2022     (L) 05/18/2022    K 3.6 (L) 05/18/2022     05/18/2022    CREATININE 1.27 (H) 05/18/2022    BUN 28 (H) 05/18/2022    CO2 21 05/18/2022    TSH 2.93 05/15/2022    CRP 10.2 (H) 05/18/2022       Radiology   MRI CERVICAL SPINE WO CONTRAST    Result Date: 5/17/2022  EXAMINATION: MRI OF THE CERVICAL SPINE WITHOUT CONTRAST 5/17/2022 11:12 am TECHNIQUE: Multiplanar multisequence MRI of the cervical spine was performed without the administration of intravenous contrast. COMPARISON: CT cervical spine 05/15/2022 HISTORY: ORDERING SYSTEM PROVIDED HISTORY: falls, leg weakness TECHNOLOGIST PROVIDED HISTORY: falls, leg weakness What is the sedation requirement?->None Reason for Exam: Multiple Falls - Leg weakness. FINDINGS: BONES/ALIGNMENT: There is reversal of the normal cervical lordosis. There is no acute fracture.   There is multilevel disc desiccation. Severe multilevel disc space narrowing is present from C3-C4 to C7-T1. Multilevel degenerative endplate changes are present. Bone marrow signal intensity is normal. SPINAL CORD: There is intramedullary signal abnormality within the cervical spinal cord at C4-C5. The cervical spinal cord is otherwise normal in size and signal intensities. SOFT TISSUES: No paraspinal mass is identified. C2-C3: There is no disc bulge or protrusion present. There is no significant spinal canal stenosis or neural foraminal narrowing present. C3-C4: There is a broad posterior disc osteophyte complex, uncovertebral overgrowth and facet arthropathy. There is moderate spinal canal stenosis and severe bilateral neural foraminal narrowing. C4-C5: There is a broad posterior disc osteophyte complex, uncovertebral overgrowth and facet arthropathy. There is moderate spinal canal stenosis and severe bilateral neural foraminal narrowing. C5-C6: There is a disc bulge mildly narrowing the spinal canal.  No neural foraminal narrowing is present. C6-C7: There is a broad posterior disc osteophyte complex and uncovertebral overgrowth. There is mild spinal canal stenosis and severe bilateral neural foraminal narrowing. C7-T1: There is a broad posterior disc osteophyte complex mildly narrowing the spinal canal.  There is severe bilateral neural foraminal narrowing. 1. Moderate spinal canal stenosis and severe bilateral neural foraminal narrowing at C4-C5 secondary to a posterior disc osteophyte complex, uncovertebral overgrowth and facet arthropathy. There is mild increased T2 signal intensity within the spinal cord at this level suggesting spondylotic myelopathy. 2. Moderate spinal canal stenosis and severe bilateral neural foraminal narrowing at C3-C4, as described above. 3. Mild spinal canal stenosis from C5-C6 to C7-T1. 4. Severe bilateral neural foraminal narrowing at C6-C7 and C7-T1, as described above.    A/P  77 y.o. male who presents with cervical stenosis, cervical myelopathy    Messaged cardiology regarding cardiac clearance for possible surgical intervention (posterior cervical decompression fusion) this admission - pending cardiac echo  If unable to do surgery this admission patient will follow up in the office   [id number] [address] for diet  PT and OT   Neuro checks per floor protocol      Please contact neurosurgery with any changes in patients neurologic status.        Bob Leal CNP  5/18/22  5:03 PM

## 2022-05-18 NOTE — PROGRESS NOTES
[facility] Neurology   [address] [place] [address]    Progress note             Date:   5/18/2022  Patient name:  Carol Mendoza  Date of admission:  5/15/2022  3:39 PM  MRN:   [id number]  Account:  [de-identified]  YOB: 1945  PCP:    No primary care provider on file. Room:   [address] [place] [address]  Code Status:    Full Code    Chief Complaint:     Chief Complaint   Patient presents with    Loss of Consciousness       Interval hx:     Patient seen and examined at bedside. He was tested positive for COVID yesterday as he was persistently febrile. Afebrile in the past 24 hours  Blood pressure on the lower side  Mentation seems better today  Continues to have upper extremity tremors but improving on Sinemet    Brief History of Present Illness: The patient is a 77 y.o. Non- / non  male who presents with Loss of Consciousness   and he is admitted to the hospital for the management of syncope. Hx of Parkinson dis not on meds, ETOH abuse, came with syncope. Cardiology consulted. Sinemet started with improvement. Fever, infectious workup pending. Orthostatics. Past Medical History:     No past medical history on file. Past Surgical History:     No past surgical history on file. Medications Prior to Admission:     Prior to Admission medications    Not on File        Allergies:     Patient has no known allergies. Social History:     Tobacco:    has no history on file for tobacco use. Alcohol:      has no history on file for alcohol use. Drug Use:  has no history on file for drug use. Family History:     No family history on file.     Review of Systems:     ROS:  Constitutional  Negative for fever and chills    HEENT  Negative for ear discharge, ear pain, nosebleed    Eyes  Negative for photophobia, pain and discharge    Respiratory  Negative for hemoptysis and sputum    Cardiovascular  Negative for orthopnea, claudication and PND    Gastrointestinal Negative for abdominal pain, diarrhea, blood in stool    Musculoskeletal  Negative for joint pain, negative for myalgia    Neurology syncope   Skin  Negative for rash or itching    Endo/heme/allergies  Negative for polydipsia, environmental allergy    Psychiatric/behavioral  Negative for suicidal ideation. Patient is not anxious        Physical Exam:   BP (!) 99/58   Pulse 73   Temp 98.7 °F (37.1 °C) (Oral)   Resp 18   Ht 5' 9.02\" (1.753 m)   Wt 122 lb (55.3 kg)   SpO2 92%   BMI 18.02 kg/m²   Temp (24hrs), Av.2 °F (36.8 °C), Min:96.7 °F (35.9 °C), Max:99.2 °F (37.3 °C)    Recent Labs     22  0105 22  0601 22  0618 22  1208   POCGLU 78 70* 69* 84       Intake/Output Summary (Last 24 hours) at 2022 0958  Last data filed at 2022 4099  Gross per 24 hour   Intake 1927.54 ml   Output 0 ml   Net 1927.54 ml         NEUROLOGIC EXAMINATION  GENERAL  Appears comfortable and in no distress   HEENT  NC/ AT   NECK  Supple and no bruits heard   MENTAL STATUS:  Alert, oriented x2 not to time, mild dysarthria, normal language, no hallucination or delusion   CRANIAL NERVES: II     -      Visual fields intact to confrontation  III,IV,VI -  EOMs full, no afferent defect, no POLI, no ptosis  V     -     Normal facial sensation  VII    -     Normal facial symmetry  VIII   -     Intact hearing  IX,X -     Symmetrical palate  XI    -     Symmetrical shoulder shrug  XII   -     Midline tongue, no atrophy    MOTOR FUNCTION:  The patient was able to antigravity both upper and lower extremity with significant strength, dramatic improvement in the resting tremor mild with moderate cogwheel rigidity bilaterally more on the right side, tremor on the chin.    SENSORY FUNCTION:  Withdraw to pain in all extremities   CEREBELLAR FUNCTION:  Intact fine motor control over upper limbs   REFLEX FUNCTION:  Hyperreflexic in the right side   STATION and GAIT  Not tested       Investigations:      Laboratory Testing:  Recent Results (from the past 24 hour(s))   EKG 12 Lead    Collection Time: 05/17/22 10:04 AM   Result Value Ref Range    Ventricular Rate 64 BPM    Atrial Rate 64 BPM    P-R Interval 164 ms    QRS Duration 94 ms    Q-T Interval 394 ms    QTc Calculation (Bazett) 406 ms    P Axis 79 degrees    R Axis -60 degrees    T Axis 69 degrees   POC Glucose Fingerstick    Collection Time: 05/17/22 12:08 PM   Result Value Ref Range    POC Glucose 84 75 - 110 mg/dL   COVID-19, Rapid    Collection Time: 05/17/22 12:47 PM    Specimen: Nasopharyngeal Swab   Result Value Ref Range    Specimen Description . NASOPHARYNGEAL SWAB     SARS-CoV-2, Rapid DETECTED (A) Not Detected   Urinalysis    Collection Time: 05/18/22  2:36 AM   Result Value Ref Range    Color, UA Yellow Yellow    Turbidity UA Clear Clear    Glucose, Ur 1+ (A) NEGATIVE    Bilirubin Urine NEGATIVE NEGATIVE    Ketones, Urine NEGATIVE NEGATIVE    Specific Gravity, UA 1.019 1.005 - 1.030    Urine Hgb TRACE (A) NEGATIVE    pH, UA 5.0 5.0 - 8.0    Protein, UA TRACE (A) NEGATIVE    Urobilinogen, Urine Normal Normal    Nitrite, Urine NEGATIVE NEGATIVE    Leukocyte Esterase, Urine NEGATIVE NEGATIVE   Microscopic Urinalysis    Collection Time: 05/18/22  2:36 AM   Result Value Ref Range    -          WBC, UA 5 TO 10 0 - 5 /HPF    RBC, UA 2 TO 5 0 - 4 /HPF    Epithelial Cells UA 0 TO 2 0 - 5 /HPF   Basic Metabolic Panel w/ Reflex to MG    Collection Time: 05/18/22  5:07 AM   Result Value Ref Range    Glucose 86 70 - 99 mg/dL    BUN 28 (H) 8 - 23 mg/dL    CREATININE 1.27 (H) 0.70 - 1.20 mg/dL    Calcium 7.1 (L) 8.6 - 10.4 mg/dL    Sodium 133 (L) 135 - 144 mmol/L    Potassium 3.6 (L) 3.7 - 5.3 mmol/L    Chloride 104 98 - 107 mmol/L    CO2 21 20 - 31 mmol/L    Anion Gap 8 (L) 9 - 17 mmol/L    GFR Non-African American 55 (L) >60 mL/min    GFR African American >60 >60 mL/min    GFR Comment         CBC with Auto Differential    Collection Time: 05/18/22  5:07 AM   Result Value Ref Range    WBC 4.4 3.5 - 11.3 k/uL    RBC 3.05 (L) 4.21 - 5.77 m/uL    Hemoglobin 9.4 (L) 13.0 - 17.0 g/dL    Hematocrit 27.9 (L) 40.7 - 50.3 %    MCV 91.5 82.6 - 102.9 fL    MCH 30.8 25.2 - 33.5 pg    MCHC 33.7 28.4 - 34.8 g/dL    RDW 13.9 11.8 - 14.4 %    Platelets 87 (L) 962 - 453 k/uL    MPV 12.8 8.1 - 13.5 fL    NRBC Automated 0.0 0.0 per 100 WBC    Seg Neutrophils 62 36 - 65 %    Lymphocytes 24 24 - 43 %    Monocytes 10 3 - 12 %    Eosinophils % 2 1 - 4 %    Basophils 1 0 - 2 %    Immature Granulocytes 1 (H) 0 %    Segs Absolute 2.75 1.50 - 8.10 k/uL    Absolute Lymph # 1.06 (L) 1.10 - 3.70 k/uL    Absolute Mono # 0.43 0.10 - 1.20 k/uL    Absolute Eos # 0.07 0.00 - 0.44 k/uL    Basophils Absolute <0.03 0.00 - 0.20 k/uL    Absolute Immature Granulocyte <0.03 0.00 - 0.30 k/uL   D-Dimer, Quantitative    Collection Time: 05/18/22  5:07 AM   Result Value Ref Range    D-Dimer, Quant 1.45 mg/L FEU   C-Reactive Protein    Collection Time: 05/18/22  5:07 AM   Result Value Ref Range    CRP 10.2 (H) 0.0 - 5.0 mg/L   Ferritin    Collection Time: 05/18/22  5:07 AM   Result Value Ref Range    Ferritin 236 30 - 400 ng/mL   Lactate Dehydrogenase    Collection Time: 05/18/22  5:07 AM   Result Value Ref Range     135 - 225 U/L         Assessment :      Primary Problem  Weakness  Parkinson disease  Syncope  Active Hospital Problems    Diagnosis Date Noted    Iron deficiency anemia [D50.9] 05/18/2022     Priority: Medium    COVID-19 [U07.1] 05/17/2022     Priority: Medium    KALYAN (acute kidney injury) ([facility] [address] [N17.9] 05/17/2022     Priority: Medium    Cervical myelopathy ([address] [G95.9] 05/17/2022     Priority: Medium    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 Neurology has been following for tremors related to parkinsonism.      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 Patient has been started on Sinemet with significant improvement in the resting tremor  2. Unremarkable MRI of brain. 3. Patient has been tested positive for COVID and is being managed by infectious disease  4. Continue physical therapy      Follow-up further recommendations after discussing the case with attending  The plan was discussed with the patient, patient's family and the medical staff. Consultations:   IP CONSULT TO NEUROLOGY  IP CONSULT TO CARDIOLOGY  IP CONSULT TO NEUROSURGERY  IP CONSULT TO INTERNAL MEDICINE  IP CONSULT TO CASE MANAGEMENT  IP CONSULT TO DIETITIAN  IP CONSULT TO INFECTIOUS DISEASES  PHARMACY TO DOSE MEDICATION    Patient is admitted as inpatient status because of co-morbidities listed above, severity of signs and symptoms as outlined, requirement for current medical therapies and most importantly because of direct risk to patient if care not provided in a hospital setting.     Keith Ray MD  5/18/2022  9:58 AM

## 2022-05-18 NOTE — CARE COORDINATION
Called Sincere ford at the [place] left voicemail asking for a return call to discuss options for the patient.      Esteban Duong patient's brother Florencio Mercer left voicemail asking for a return call

## 2022-05-18 NOTE — PLAN OF CARE
Problem: Discharge Planning  Goal: Discharge to home or other facility with appropriate resources  5/18/2022 1627 by Cathy Banegas RN  Outcome: Progressing     Problem: Skin/Tissue Integrity  Goal: Absence of new skin breakdown  Description: 1. Monitor for areas of redness and/or skin breakdown  2. Assess vascular access sites hourly  3. Every 4-6 hours minimum:  Change oxygen saturation probe site  4. Every 4-6 hours:  If on nasal continuous positive airway pressure, respiratory therapy assess nares and determine need for appliance change or resting period. 5/18/2022 1627 by Cathy Banegas RN  Outcome: Progressing     Problem: Safety - Adult  Goal: Free from fall injury  5/18/2022 1627 by Cathy Banegas RN  Outcome: Progressing     Problem: Confusion  Goal: Confusion, delirium, dementia, or psychosis is improved or at baseline  Description: INTERVENTIONS:  1. Assess for possible contributors to thought disturbance, including medications, impaired vision or hearing, underlying metabolic abnormalities, dehydration, psychiatric diagnoses, and notify attending LIP  2. Dexter high risk fall precautions, as indicated  3. Provide frequent short contacts to provide reality reorientation, refocusing and direction  4. Decrease environmental stimuli, including noise as appropriate  5. Monitor and intervene to maintain adequate nutrition, hydration, elimination, sleep and activity  6. If unable to ensure safety without constant attention obtain sitter and review sitter guidelines with assigned personnel  7.  Initiate Psychosocial CNS and Spiritual Care consult, as indicated  Outcome: Progressing

## 2022-05-18 NOTE — PROGRESS NOTES
[facility]  Progress Note                   Date:   5/18/2022  Patient name: Dayron Pretty  Date of admission:  5/15/2022  3:39 PM  MRN:   [id number]  YOB: 1945  PCP: No primary care provider on file. Reason for Admission: Syncope and collapse [R55]  Weakness [R53.1]    Subjective:       Clinical Changes /Abnormalities: Patient seen and examined. Denies chest pain or shortness of breath. Tele/vitals/labs reviewed . Discussed case with RN. Sinus arrhythmia with infrequent PAC/PVC    Review of Systems    Medications:   Scheduled Meds:   vitamin D  50,000 Units Oral Weekly    iron sucrose  200 mg IntraVENous Q24H    sennosides-docusate sodium  2 tablet Oral Daily    remdesivir IVPB  100 mg IntraVENous Q24H    carbidopa-levodopa  1 tablet Oral TID    sodium chloride flush  5-40 mL IntraVENous 2 times per day     Continuous Infusions:   dextrose 100 mL/hr (05/18/22 0653)    sodium chloride 100 mL/hr at 05/18/22 0958    sodium chloride       CBC:   Recent Labs     05/16/22  0624 05/17/22  0116 05/18/22  0507   WBC 5.5 5.6 4.4   HGB 8.9* 8.9* 9.4*   * 94* 87*     BMP:    Recent Labs     05/16/22  0624 05/17/22  0116 05/18/22  0507    136 133*   K 3.3* 4.0 3.6*    106 104   CO2 26 22 21   BUN 40* 39* 28*   CREATININE 1.73* 1.62* 1.27*   GLUCOSE 70 74 86     Hepatic:  Recent Labs     05/15/22  1602 05/17/22  0906   AST 42* 41*   ALT 21 6   BILITOT 0.59 0.24*   ALKPHOS 55 37*     Troponin:   Recent Labs     05/16/22  1934 05/17/22  0116 05/17/22  0739   TROPHS 65* 64* 58*     BNP: No results for input(s): BNP in the last 72 hours. Lipids: No results for input(s): CHOL, HDL in the last 72 hours. Invalid input(s): LDLCALCU  INR: No results for input(s): INR in the last 72 hours.        Cardiac history,    EKG: normal sinus rhythm with some lateral ischemic ST   No Stress test, Echo or cardiac cath on Epic    Objective:   Vitals: BP (!) 109/58   Pulse 70 Temp 98.5 °F (36.9 °C) (Oral)   Resp 21   Ht 5' 9.02\" (1.753 m)   Wt 122 lb (55.3 kg)   SpO2 94%   BMI 18.02 kg/m²   General appearance: alert and cooperative with exam  HEENT: Head: Normocephalic, no lesions, without obvious abnormality. Neck:no JVD, trachea midline, no adenopathy  Lungs: Clear to auscultation  Heart: Regular rate and rhythm, s1/s2 auscultated, no murmurs  Abdomen: soft, non-tender, bowel sounds active  Extremities: no edema  Neurologic: not done        Assessment / Acute Cardiac Problems:   1. History of syncope multifactorial, possible vasovagal, arrhythmogenic, orthostatic changes  1. . Troponin 59-->55-->62  2. KALYAN  3. HTN  4. HLD  5. Parkinson's disease  6. Chronic alcohol abuse       Patient Active Problem List:     Weakness     Syncope and collapse     Orthostatic hypotension     Parkinson's disease ([facility] [address]      Plan of Treatment:   1. Flat troponin likely secondary to KALYAN/ dehydration - BP liable , will start patient on BB when blood more stable to help with PAC/pvc   2. Awaiting echo to rule out wall motion abnormality or structural heart disease  3. Holter monitoring as outpatient to rule out cardiogenic cause of syncope  4.  Keep K>4, Mg >2    Electronically signed by ANSLEY Coleman NP on 5/18/2022 at 12:49 PM  [address]  [phone]

## 2022-05-18 NOTE — CONSULTS
[facility] -   Infectious diseases evaluation  admission date 5/15/2022    reason for consultation:   covid     Impression :   Current:  · Syncope  · COVID, mild illness  · Parkinson  · KALYAN    Other:  ·   Discussion / summary of stay / plan of care   ·   Recommendations   · remdesevir 5 days 5/17 -5/21 - might stop after 5/19 if much better  · FU LFT  · No other covid tx required  Disc w resident  Infection Control Recommendations   · Universal Precautions  · Droplet plus Isolation      Antimicrobial Stewardship Recommendations   · Simplification of therapy  · Targeted therapy    History of Present Illness:   Initial history:  Shelly England is a 77y.o.-year-old male who presented to the hospital 5/15 with a syncopal episode, while he was sitting in his wheelchair. No seizure activity  Was having some cough and  COVID-positive tested before he came in. CXR neg  CT chest neg  - CT AP L3 vertebral body multiple round lesions. pt does have some underlying and uses a wheelchair  Past etoh++    Pt is on RAand very poor historian    Interval changes  5/18/2022   Patient Vitals for the past 8 hrs:   BP Temp Temp src Pulse Resp SpO2 Weight   05/18/22 0852 (!) 99/58 98.7 °F (37.1 °C) Oral 73 18 92 % --   05/18/22 0810 -- -- -- 72 -- -- --   05/18/22 0600 -- -- -- -- -- -- 122 lb (55.3 kg)   05/18/22 0400 (!) 99/55 98.4 °F (36.9 °C) Oral 66 -- -- --       Summary of relevant labs:  Labs:  CRP 12 - 10  Creat 1.27  WBC 4.4      Micro:  BC 5/17  U cx 5/17    Imaging:  CT chest and AP  neg for pneumonia   Upper Abdomen: Cholelithiasis and free fluid within peritoneal cavity which may be secondary to ascites. I have personally reviewed the past medical history, past surgical history, medications, social history, and family history, and I haveupdated the database accordingly. Allergies:   Patient has no known allergies.      Review of Systems:     Review of Systems Constitutional: Positive for activity change. HENT: Positive for congestion. Eyes: Negative for discharge. Respiratory: Positive for cough. Negative for apnea. Cardiovascular: Negative for chest pain. Gastrointestinal: Negative for abdominal distention. Endocrine: Negative for heat intolerance. Genitourinary: Negative for dysuria. Musculoskeletal: Negative for arthralgias. Skin: Negative for color change. Allergic/Immunologic: Negative for immunocompromised state. Neurological: Negative for dizziness. Hematological: Negative for adenopathy. Psychiatric/Behavioral: Negative for agitation. Physical Examination :       Physical Exam  Constitutional:       Appearance: Normal appearance. He is not ill-appearing. HENT:      Head: Normocephalic and atraumatic. Nose: Nose normal.      Mouth/Throat:      Mouth: Mucous membranes are moist.   Eyes:      General: No scleral icterus. Conjunctiva/sclera: Conjunctivae normal.      Pupils: Pupils are equal, round, and reactive to light. Cardiovascular:      Rate and Rhythm: Normal rate and regular rhythm. Heart sounds: Normal heart sounds. No murmur heard. Pulmonary:      Effort: No respiratory distress. Breath sounds: Normal breath sounds. No stridor. Abdominal:      General: There is no distension. Palpations: Abdomen is soft. There is no mass. Genitourinary:     Comments: No broussard  Musculoskeletal:         General: No swelling or deformity. Cervical back: Neck supple. No rigidity. Skin:     General: Skin is dry. Coloration: Skin is not jaundiced. Neurological:      General: No focal deficit present. Mental Status: He is alert and oriented to person, place, and time. Psychiatric:         Mood and Affect: Mood normal.         Thought Content: Thought content normal.         Past Medical History:   No past medical history on file.     Past Surgical  History:   No past surgical history on file.    Medications:      vitamin D  50,000 Units Oral Weekly    iron sucrose  200 mg IntraVENous Q24H    sennosides-docusate sodium  2 tablet Oral Daily    remdesivir IVPB  100 mg IntraVENous Q24H    thiamine (VITAMIN B1) IVPB  100 mg IntraVENous Q24H    carbidopa-levodopa  1 tablet Oral TID    sodium chloride flush  5-40 mL IntraVENous 2 times per day       Social History:     Social History     Socioeconomic History    Marital status: Single     Spouse name: Not on file    Number of children: Not on file    Years of education: Not on file    Highest education level: Not on file   Occupational History    Not on file   Tobacco Use    Smoking status: Not on file    Smokeless tobacco: Not on file   Substance and Sexual Activity    Alcohol use: Not on file    Drug use: Not on file    Sexual activity: Not on file   Other Topics Concern    Not on file   Social History Narrative    Not on file     Social Determinants of Health     Financial Resource Strain:     Difficulty of Paying Living Expenses: Not on file   Food Insecurity:     Worried About Running Out of Food in the Last Year: Not on file    Ashley of Food in the Last Year: Not on file   Transportation Needs:     Lack of Transportation (Medical): Not on file    Lack of Transportation (Non-Medical):  Not on file   Physical Activity:     Days of Exercise per Week: Not on file    Minutes of Exercise per Session: Not on file   Stress:     Feeling of Stress : Not on file   Social Connections:     Frequency of Communication with Friends and Family: Not on file    Frequency of Social Gatherings with Friends and Family: Not on file    Attends Sabianism Services: Not on file    Active Member of Clubs or Organizations: Not on file    Attends Club or Organization Meetings: Not on file    Marital Status: Not on file   Intimate Partner Violence:     Fear of Current or Ex-Partner: Not on file    Emotionally Abused: Not on file    Physically Abused: Not on file    Sexually Abused: Not on file   Housing Stability:     Unable to Pay for Housing in the Last Year: Not on file    Number of Places Lived in the Last Year: Not on file    Unstable Housing in the Last Year: Not on file       Family History:   No family history on file. Medical Decision Making:   I have independently reviewed/ordered the following labs:    CBC with Differential:   Recent Labs     05/17/22  0116 05/18/22  0507   WBC 5.6 4.4   HGB 8.9* 9.4*   HCT 26.5* 27.9*   PLT 94* 87*   LYMPHOPCT 18* 24   MONOPCT 10 10     BMP:  Recent Labs     05/16/22  0624 05/16/22  0624 05/17/22  0116 05/18/22  0507      < > 136 133*   K 3.3*   < > 4.0 3.6*      < > 106 104   CO2 26   < > 22 21   BUN 40*   < > 39* 28*   CREATININE 1.73*   < > 1.62* 1.27*   MG 2.0  --   --   --     < > = values in this interval not displayed. Hepatic Function Panel:   Recent Labs     05/15/22  1602 05/17/22  0906   PROT 6.5 4.5*   LABALBU 3.7 2.4*   BILIDIR  --  0.11   IBILI  --  0.13   BILITOT 0.59 0.24*   ALKPHOS 55 37*   ALT 21 6   AST 42* 41*     No results for input(s): RPR in the last 72 hours. No results for input(s): HIV in the last 72 hours. No results for input(s): BC in the last 72 hours. Lab Results   Component Value Date    CREATININE 1.27 05/18/2022    GLUCOSE 86 05/18/2022       Detailed results: Thank you for allowing us to participate in the care of this patient. Please call with questions. This note is created with the assistance of a speech recognition program.  While intending to generate adocument that actually reflects the content of the visit, the document can still have some errors including those of syntax and sound a like substitutions which may escape proof reading. It such instances, actual meaningcan be extrapolated by contextual diversion.     Tomas Zaidi MD  Office: [phone]  Perfect serve / office [phone]

## 2022-05-18 NOTE — PROGRESS NOTES
[facility]  Internal Medicine Teaching Residency Program  Inpatient Daily Progress Note  ______________________________________________________________________________    Patient: Salina Sandy  YOB: 1945   DF    Acct: [de-identified]     Room: [facility]  Admit date: 5/15/2022  Today's date: 22  Number of days in the hospital: 3    SUBJECTIVE   Admitting Diagnosis: Weakness  CC: Syncopal episode  Pt examined at bedside. Chart & results reviewed. No acute events overnight  Afebrile, blood pressure 99/58  AAO x2  Saturating on room air, not in apparent distress    ROS:  Constitutional:  negative for chills, fevers, sweats  Respiratory:  negative for cough, dyspnea on exertion, hemoptysis, shortness of breath, wheezing  Cardiovascular:  negative for chest pain, chest pressure/discomfort, lower extremity edema, palpitations  Gastrointestinal:  negative for abdominal pain, constipation, diarrhea, nausea, vomiting  Neurological:  positive for dizziness,     BRIEF HISTORY     The patient is a pleasant 76 y. o. male with past medical history signifiacnt for parkinsonism disease, hypertension and hyperlipidemia presents through EMS with chief complaint of syncope that occurred just prior to arrival.  History was mostly provided by the caretaker the ER resident.      Patient is admitted for syncopal episode work-up. Likely multifactorial due to dysautonomia, orthostatic hypotension, neurocardiogenic, neurogenic due to cervical spinal stenosis and canal narrowing. Cardiology consulted for ruling out cardiogenic causes. Plan for echo.     Dehydration secondary to decreased oral intake causing KALYAN, low blood pressure, orthostatic hypotension. COVID-positive.   ID following      OBJECTIVE     Vital Signs:  BP (!) 99/55   Pulse 66   Temp 98.4 °F (36.9 °C) (Oral)   Resp 21   Ht 5' 9.02\" (1.753 m)   Wt 122 lb (55.3 kg)   SpO2 100%   BMI 18.02 kg/m²     Temp (24hrs), Av.1 °F (36.7 °C), Min:96.7 °F (35.9 °C), Max:99.2 °F (37.3 °C)    In: . 2   Out: 0     Physical Exam:  Constitutional: This is a well developed, well nourished, 17-18.4 - Mild malnutrition / Protein energy malnutrition Grade I 77y.o. year old male who is alert, oriented, cooperative and in no apparent distress. Head:normocephalic and atraumatic. Mask like facies. EENT:  PERRLA. No conjunctival injections. Septum was midline, mucosa was without erythema, exudates or cobblestoning. No thrush was noted. Neck: Supple without thyromegaly. No elevated JVP. Trachea was midline. Respiratory: Chest was symmetrical without dullness to percussion. Breath sounds bilaterally were clear to auscultation. There were no wheezes, rhonchi or rales. There is no intercostal retraction or use of accessory muscles. No egophony noted. Cardiovascular: Regular without murmur, clicks, gallops or rubs. Abdomen: Slightly rounded and soft without organomegaly. No rebound, rigidity or guarding was appreciated. Lymphatic: No lymphadenopathy. Musculoskeletal: Normal curvature of the spine. No gross muscle weakness. Extremities:  No lower extremity edema, ulcerations, tenderness, varicosities or erythema. Muscle size, tone and strength are normal.  No involuntary movements are noted. Bilateral upper extremity tremors. Skin:  Warm and dry. Good color, turgor and pigmentation. No lesions or scars.   No cyanosis or clubbing  Neurological/Psychiatric: The patient's general behavior, level of consciousness, thought content and emotional status is normal.        Medications:  Scheduled Medications:    sennosides-docusate sodium  2 tablet Oral Daily    remdesivir IVPB  100 mg IntraVENous Q24H    thiamine (VITAMIN B1) IVPB  100 mg IntraVENous Q24H    carbidopa-levodopa  1 tablet Oral TID    sodium chloride flush  5-40 mL IntraVENous 2 times per day    [Held by provider] heparin (porcine) 5,000 Units SubCUTAneous 3 times per day     Continuous Infusions:    dextrose 100 mL/hr (05/18/22 0653)    sodium chloride Stopped (05/17/22 0639)    sodium chloride       PRN Medicationssodium chloride, 30 mL, PRN  glucose, 4 tablet, PRN  dextrose bolus, 125 mL, PRN   Or  dextrose bolus, 250 mL, PRN  glucagon (rDNA), 1 mg, PRN  dextrose, 100 mL/hr, PRN  sodium chloride flush, 10 mL, PRN  sodium chloride flush, 5-40 mL, PRN  sodium chloride, , PRN  ondansetron, 4 mg, Q8H PRN   Or  ondansetron, 4 mg, Q6H PRN  polyethylene glycol, 17 g, Daily PRN  acetaminophen, 650 mg, Q6H PRN   Or  acetaminophen, 650 mg, Q6H PRN        Diagnostic Labs:  CBC:   Recent Labs     05/16/22  0624 05/17/22  0116 05/18/22  0507   WBC 5.5 5.6 4.4   RBC 2.93* 2.90* 3.05*   HGB 8.9* 8.9* 9.4*   HCT 26.7* 26.5* 27.9*   MCV 91.1 91.4 91.5   RDW 13.7 13.7 13.9   * 94* 87*     BMP:   Recent Labs     05/16/22  0624 05/17/22  0116 05/18/22  0507    136 133*   K 3.3* 4.0 3.6*    106 104   CO2 26 22 21   BUN 40* 39* 28*   CREATININE 1.73* 1.62* 1.27*     BNP: No results for input(s): BNP in the last 72 hours. PT/INR: No results for input(s): PROTIME, INR in the last 72 hours. APTT: No results for input(s): APTT in the last 72 hours. CARDIAC ENZYMES: No results for input(s): CKMB, CKMBINDEX, TROPONINI in the last 72 hours. Invalid input(s): CKTOTAL;3  FASTING LIPID PANEL:No results found for: CHOL, HDL, TRIG  LIVER PROFILE:   Recent Labs     05/15/22  1602 05/17/22  0906   AST 42* 41*   ALT 21 6   BILIDIR  --  0.11   BILITOT 0.59 0.24*   ALKPHOS 55 37*      MICROBIOLOGY:   Lab Results   Component Value Date/Time    CULTURE NO GROWTH 12 HOURS 05/17/2022 09:05 AM       Imaging:    CT Head WO Contrast    Result Date: 5/15/2022  Markedly limited exam due to motion artifact. No gross acute intracranial abnormality. CT Cervical Spine WO Contrast    Result Date: 5/15/2022  No acute abnormality of the cervical spine.  Advanced multilevel degenerative disc disease and facet arthropathy. CT Lumbar Spine WO Contrast    Result Date: 5/15/2022  Chest CT angiogram: No evidence of pulmonary embolism or acute pulmonary abnormality. Cholelithiasis. Moderate free fluid within peritoneal cavity may be suggestive of ascites. CT of thoracic spine: No acute osseous abnormality. No fracture. CT of lumbar spine: No acute osseous abnormality. No fracture. Mild levoscoliosis of thoracolumbar junction. Multiple round hypodense lesions are noted throughout the lumbar spine most pronounced within L3 vertebral body. Findings may be related to osteopenia and subchondral cystic change. Marrow infiltrative lesions cannot be completely excluded. RECOMMENDATIONS: Unavailable     MRI CERVICAL SPINE WO CONTRAST    Result Date: 5/17/2022  1. Moderate spinal canal stenosis and severe bilateral neural foraminal narrowing at C4-C5 secondary to a posterior disc osteophyte complex, uncovertebral overgrowth and facet arthropathy. There is mild increased T2 signal intensity within the spinal cord at this level suggesting spondylotic myelopathy. 2. Moderate spinal canal stenosis and severe bilateral neural foraminal narrowing at C3-C4, as described above. 3. Mild spinal canal stenosis from C5-C6 to C7-T1. 4. Severe bilateral neural foraminal narrowing at C6-C7 and C7-T1, as described above. MRI LUMBAR SPINE W WO CONTRAST    Result Date: 5/15/2022  The previously noted round hypodense lesions throughout the lumbar spine correspond to subchondral cystic change/degenerative findings. No evidence of marrow infiltrative lesion is noted. Partial visualization of a massive cystic structure within the right side of peritoneal cavity, measuring approximately 14 cm. The lesion does not appear to be arising from the right kidney. If there is need for further evaluation, CT of the abdomen and pelvis can be obtained.  At L5-S1, grade 1 anterolisthesis, bilateral pars defects and severe bilateral neural foraminal narrowing RECOMMENDATIONS: Unavailable     XR CHEST PORTABLE    Result Date: 5/17/2022  No acute cardiopulmonary findings. XR CHEST PORTABLE    Result Date: 5/15/2022  No acute cardiopulmonary disease. CT CHEST PULMONARY EMBOLISM W CONTRAST    Result Date: 5/15/2022  Chest CT angiogram: No evidence of pulmonary embolism or acute pulmonary abnormality. Cholelithiasis. Moderate free fluid within peritoneal cavity may be suggestive of ascites. CT of thoracic spine: No acute osseous abnormality. No fracture. CT of lumbar spine: No acute osseous abnormality. No fracture. Mild levoscoliosis of thoracolumbar junction. Multiple round hypodense lesions are noted throughout the lumbar spine most pronounced within L3 vertebral body. Findings may be related to osteopenia and subchondral cystic change. Marrow infiltrative lesions cannot be completely excluded. RECOMMENDATIONS: Unavailable     XR CERVICAL SPINE FLEXION AND EXTENSION    Result Date: 5/17/2022  Advanced multilevel spondylosis without any findings of dynamic instability. Overall limited range of motion. MRI BRAIN WO CONTRAST    Result Date: 5/17/2022  1. No acute infarct or acute intracranial process identified. 2. Diffuse cerebral volume loss and moderate chronic small vessel ischemic changes. FL MODIFIED BARIUM SWALLOW W VIDEO    Result Date: 5/17/2022  1. No aspiration. 2. Deep laryngeal penetration of nectar thick liquid and thin liquid. Please see separate speech pathology report for full discussion of findings and recommendations. CT THORACIC SPINE TRAUMA RECONSTRUCTION    Result Date: 5/15/2022  Chest CT angiogram: No evidence of pulmonary embolism or acute pulmonary abnormality. Cholelithiasis. Moderate free fluid within peritoneal cavity may be suggestive of ascites. CT of thoracic spine: No acute osseous abnormality. No fracture. CT of lumbar spine: No acute osseous abnormality.   No fracture. Mild levoscoliosis of thoracolumbar junction. Multiple round hypodense lesions are noted throughout the lumbar spine most pronounced within L3 vertebral body. Findings may be related to osteopenia and subchondral cystic change. Marrow infiltrative lesions cannot be completely excluded. RECOMMENDATIONS: Unavailable       ASSESSMENT & PLAN     Principal Problem:    Weakness  Active Problems:    Syncope and collapse    Hypotension    Parkinson's disease ([facility] [address]    COVID-19    KALYAN (acute kidney injury) (HCC)    Cervical myelopathy (HCC)    Orthostatic hypertension    Iron deficiency anemia  Resolved Problems:    * No resolved hospital problems. *    1. Syncope and collapse. Multifactorial.  Likely 2/2 orthostatic hypotension 2/2 dysautonomia due to Parkinson's disease/dehydration vs cervical myelopathy vs cardiogenic vs COVID infection. MRI cervical spine shows cervical canal narrowing. maintenance fluid @ 100 ml/hr. Monitor BP. Echo and Holter monitoring as OP. Follow on neuro and cardio recomnds  2. Parkinson's disease. Continue on Sinemet 1 tablet 3 times daily. Follow-up on neurology recommendation  3. COVID-19 infection, currently not requiring supplemental O2. Airborne precautions. Remdesivir for 5 days. Follow on ID recomnds  4. KALYAN. Continue on  mL/h. Creatinine trending down. Monitor output  5. Orthostatic hypotension. Continue on maintenance fluid. Walk with assistance. Fall precautions. Follow-up on cardiology recommendations. 6. Chronic alcoholism. Multivitamin and folate supplementation.     DVT ppx : None, heparin held by neurology  GI ppx: Tums     PT/OT: Following  Discharge Planning / SW: CM to assist with    Yara Polanco MD  Internal Medicine Resident, PGY-1  [address], [place]  5/18/2022, 7:14 AM   Attending Physician Statement  I have discussed the care of Yudelka Rosas, including pertinent history and exam findings,  with the resident.  I have seen and examined the patient and the key elements of all parts of the encounter have been performed by me. I agree with the assessment, plan and orders as documented by the resident with additions . Treatment plan Discussed with nursing staff in detail , all questions answered . Electronically signed by Sam Hernandez MD on   5/18/22 at 11:37 AM EDT    Please note that this chart was generated using voice recognition Dragon dictation software. Although every effort was made to ensure the accuracy of this automated transcription, some errors in transcription may have occurred.

## 2022-05-18 NOTE — PLAN OF CARE
Problem: Discharge Planning  Goal: Discharge to home or other facility with appropriate resources  5/18/2022 0424 by Surinder Del Cid RN  Outcome: Progressing     Problem: Skin/Tissue Integrity  Goal: Absence of new skin breakdown  Description: 1. Monitor for areas of redness and/or skin breakdown  2. Assess vascular access sites hourly  3. Every 4-6 hours minimum:  Change oxygen saturation probe site  4. Every 4-6 hours:  If on nasal continuous positive airway pressure, respiratory therapy assess nares and determine need for appliance change or resting period.   5/18/2022 0424 by Surinder Del Cid RN  Outcome: Progressing     Problem: Safety - Adult  Goal: Free from fall injury  5/18/2022 0424 by Surinder Del Cid RN  Outcome: Progressing

## 2022-05-19 ENCOUNTER — APPOINTMENT (OUTPATIENT)
Dept: GENERAL RADIOLOGY | Age: 77
DRG: 056 | End: 2022-05-19
Payer: OTHER GOVERNMENT

## 2022-05-19 LAB
ABSOLUTE EOS #: 0.04 K/UL (ref 0–0.44)
ABSOLUTE IMMATURE GRANULOCYTE: <0.03 K/UL (ref 0–0.3)
ABSOLUTE LYMPH #: 0.83 K/UL (ref 1.1–3.7)
ABSOLUTE MONO #: 0.34 K/UL (ref 0.1–1.2)
BASOPHILS # BLD: 0 % (ref 0–2)
BASOPHILS ABSOLUTE: <0.03 K/UL (ref 0–0.2)
CULTURE: NORMAL
EOSINOPHILS RELATIVE PERCENT: 1 % (ref 1–4)
GLUCOSE BLD-MCNC: 100 MG/DL (ref 75–110)
GLUCOSE BLD-MCNC: 73 MG/DL (ref 75–110)
GLUCOSE BLD-MCNC: 83 MG/DL (ref 75–110)
GLUCOSE BLD-MCNC: 84 MG/DL (ref 75–110)
GLUCOSE BLD-MCNC: 97 MG/DL (ref 75–110)
HCT VFR BLD CALC: 26.4 % (ref 40.7–50.3)
HEMOGLOBIN: 8.8 G/DL (ref 13–17)
IMMATURE GRANULOCYTES: 0 %
LYMPHOCYTES # BLD: 17 % (ref 24–43)
MCH RBC QN AUTO: 30 PG (ref 25.2–33.5)
MCHC RBC AUTO-ENTMCNC: 33.3 G/DL (ref 28.4–34.8)
MCV RBC AUTO: 90.1 FL (ref 82.6–102.9)
MONOCYTES # BLD: 7 % (ref 3–12)
NRBC AUTOMATED: 0 PER 100 WBC
PDW BLD-RTO: 13.9 % (ref 11.8–14.4)
PLATELET # BLD: 84 K/UL (ref 138–453)
PMV BLD AUTO: 12.9 FL (ref 8.1–13.5)
RBC # BLD: 2.93 M/UL (ref 4.21–5.77)
SEG NEUTROPHILS: 75 % (ref 36–65)
SEGMENTED NEUTROPHILS ABSOLUTE COUNT: 3.56 K/UL (ref 1.5–8.1)
SPECIMEN DESCRIPTION: NORMAL
WBC # BLD: 4.8 K/UL (ref 3.5–11.3)

## 2022-05-19 PROCEDURE — 2580000003 HC RX 258

## 2022-05-19 PROCEDURE — 82947 ASSAY GLUCOSE BLOOD QUANT: CPT

## 2022-05-19 PROCEDURE — 97110 THERAPEUTIC EXERCISES: CPT

## 2022-05-19 PROCEDURE — 97535 SELF CARE MNGMENT TRAINING: CPT

## 2022-05-19 PROCEDURE — 94761 N-INVAS EAR/PLS OXIMETRY MLT: CPT

## 2022-05-19 PROCEDURE — 85025 COMPLETE CBC W/AUTO DIFF WBC: CPT

## 2022-05-19 PROCEDURE — 97116 GAIT TRAINING THERAPY: CPT

## 2022-05-19 PROCEDURE — 99232 SBSQ HOSP IP/OBS MODERATE 35: CPT | Performed by: NEUROLOGICAL SURGERY

## 2022-05-19 PROCEDURE — 2580000003 HC RX 258: Performed by: INTERNAL MEDICINE

## 2022-05-19 PROCEDURE — 36415 COLL VENOUS BLD VENIPUNCTURE: CPT

## 2022-05-19 PROCEDURE — 73030 X-RAY EXAM OF SHOULDER: CPT

## 2022-05-19 PROCEDURE — 99232 SBSQ HOSP IP/OBS MODERATE 35: CPT | Performed by: INTERNAL MEDICINE

## 2022-05-19 PROCEDURE — 2060000000 HC ICU INTERMEDIATE R&B

## 2022-05-19 PROCEDURE — 2580000003 HC RX 258: Performed by: STUDENT IN AN ORGANIZED HEALTH CARE EDUCATION/TRAINING PROGRAM

## 2022-05-19 PROCEDURE — 6360000002 HC RX W HCPCS: Performed by: STUDENT IN AN ORGANIZED HEALTH CARE EDUCATION/TRAINING PROGRAM

## 2022-05-19 PROCEDURE — 6370000000 HC RX 637 (ALT 250 FOR IP): Performed by: STUDENT IN AN ORGANIZED HEALTH CARE EDUCATION/TRAINING PROGRAM

## 2022-05-19 PROCEDURE — 6370000000 HC RX 637 (ALT 250 FOR IP)

## 2022-05-19 PROCEDURE — 6360000002 HC RX W HCPCS: Performed by: SURGERY

## 2022-05-19 PROCEDURE — 2700000000 HC OXYGEN THERAPY PER DAY

## 2022-05-19 PROCEDURE — 2500000003 HC RX 250 WO HCPCS: Performed by: INTERNAL MEDICINE

## 2022-05-19 PROCEDURE — APPSS15 APP SPLIT SHARED TIME 0-15 MINUTES: Performed by: NURSE PRACTITIONER

## 2022-05-19 RX ORDER — MAGNESIUM SULFATE IN WATER 40 MG/ML
2000 INJECTION, SOLUTION INTRAVENOUS ONCE
Status: COMPLETED | OUTPATIENT
Start: 2022-05-19 | End: 2022-05-19

## 2022-05-19 RX ADMIN — SODIUM CHLORIDE, PRESERVATIVE FREE 10 ML: 5 INJECTION INTRAVENOUS at 08:37

## 2022-05-19 RX ADMIN — REMDESIVIR 100 MG: 100 INJECTION, POWDER, LYOPHILIZED, FOR SOLUTION INTRAVENOUS at 16:31

## 2022-05-19 RX ADMIN — CARBIDOPA AND LEVODOPA 1 TABLET: 25; 100 TABLET ORAL at 08:36

## 2022-05-19 RX ADMIN — ACETAMINOPHEN 650 MG: 325 TABLET ORAL at 21:11

## 2022-05-19 RX ADMIN — SODIUM CHLORIDE: 9 INJECTION, SOLUTION INTRAVENOUS at 08:43

## 2022-05-19 RX ADMIN — IRON SUCROSE 200 MG: 20 INJECTION, SOLUTION INTRAVENOUS at 08:44

## 2022-05-19 RX ADMIN — DEXTROSE MONOHYDRATE 100 ML/HR: 50 INJECTION, SOLUTION INTRAVENOUS at 00:48

## 2022-05-19 RX ADMIN — CARBIDOPA AND LEVODOPA 1 TABLET: 25; 100 TABLET ORAL at 13:45

## 2022-05-19 RX ADMIN — SODIUM CHLORIDE: 9 INJECTION, SOLUTION INTRAVENOUS at 21:01

## 2022-05-19 RX ADMIN — SODIUM CHLORIDE, PRESERVATIVE FREE 10 ML: 5 INJECTION INTRAVENOUS at 21:16

## 2022-05-19 RX ADMIN — MAGNESIUM SULFATE 2000 MG: 2 INJECTION INTRAVENOUS at 13:49

## 2022-05-19 RX ADMIN — CARBIDOPA AND LEVODOPA 1 TABLET: 25; 100 TABLET ORAL at 20:59

## 2022-05-19 ASSESSMENT — PAIN SCALES - GENERAL: PAINLEVEL_OUTOF10: 6

## 2022-05-19 ASSESSMENT — ENCOUNTER SYMPTOMS
EYE DISCHARGE: 0
ABDOMINAL DISTENTION: 0
COUGH: 1
APNEA: 0
COLOR CHANGE: 0

## 2022-05-19 ASSESSMENT — PAIN DESCRIPTION - LOCATION: LOCATION: SHOULDER

## 2022-05-19 NOTE — PROGRESS NOTES
Comprehensive Nutrition Assessment    Type and Reason for Visit:  Reassess    Nutrition Recommendations/Plan:   1. Continue diet per SLP, Dysphagia Minced and Moist (Dysphagia II) with Moderately Thick (Honey) liquids   2. Modified ONS to Frozen ONS TID  3. Monitor po intake, labs, weights      Malnutrition Assessment:  Malnutrition Status: Moderate malnutrition (to severe) (05/16/22 1323)    Context:  Chronic Illness     Findings of the 6 clinical characteristics of malnutrition:  Energy Intake:  Unable to assess  Weight Loss:  Unable to assess     Body Fat Loss:  Mild body fat loss (to moderate) Buccal region,Triceps   Muscle Mass Loss:  Severe muscle mass loss Clavicles (pectoralis & deltoids)  Fluid Accumulation:  No significant fluid accumulation     Strength:  Not Performed    Nutrition Assessment:    Per nurse patient has poor appetite and po intake, picky eater, needs assistance with feeding. Pt doesn't like Ensure Enlive ONS, discontinued. Diet per SLP recommendations - Dysphagia Minced and Moist (Dysphagia II) with Moderately Thick (Honey) liquids for. Wts reviewed - will continue to monitor. Nutrition Related Findings:    labs/meds reviewed. no edema noted. LBM 5/18. Wound Type: None       Current Nutrition Intake & Therapies:    Average Meal Intake: 26-50% (per chart)  Average Supplements Intake: 0% (per nurse)  ADULT DIET; Dysphagia - Minced and Moist; Moderately Thick (Honey)  ADULT ORAL NUTRITION SUPPLEMENT; Breakfast, Lunch, Dinner; Frozen Oral Supplement    Anthropometric Measures:  Height: 5' 9.02\" (175.3 cm)  Ideal Body Weight (IBW): 160 lbs (73 kg)       Current Body Weight: 122 lb (55.3 kg),   IBW.  Weight Source: Bed Scale  Current BMI (kg/m2): 18        Weight Adjustment For: No Adjustment                 BMI Categories: Underweight (BMI less than 22) age over 72    Estimated Daily Nutrient Needs:  Energy Requirements Based On: Kcal/kg     Energy (kcal/day): 9828-3007 kcals/day (30-33

## 2022-05-19 NOTE — PLAN OF CARE
NO ACUTE NEUROSURGICAL INTERVENTION AT THIS TIME    NEUROSURGERY TO SIGN OFF     Please contact Neurosurgery with any questions or acute changes in neurological exam    PATIENT TO FOLLOW UP IN CLINIC:  Follow-up with Neurosurgery  [address]  [address]  Call [phone] for an appointment.     Please follow up in 4 weeks with Dr Sinan Chacon to discuss surgery    Electronically signed by ANSLEY Drew NP on 5/19/2022 at 6:11 PM

## 2022-05-19 NOTE — PROGRESS NOTES
[facility]  Progress Note                   Date:   5/19/2022  Patient name: Shelia Fitch  Date of admission:  5/15/2022  3:39 PM  MRN:   [id number]  YOB: 1945  PCP: No primary care provider on file. Reason for Admission: Syncope and collapse [R55]  Weakness [R53.1]    Subjective:       Clinical Changes /Abnormalities: Patient seen and examined. Denies chest pain or shortness of breath. Tele/vitals/labs reviewed . Discussed case with RN. Sinus arrhythmia  57-60 with infrequent PAC/PVC    Review of Systems    Medications:   Scheduled Meds:   vitamin D  50,000 Units Oral Weekly    iron sucrose  200 mg IntraVENous Q24H    sodium chloride  250 mL IntraVENous Once    sennosides-docusate sodium  2 tablet Oral Daily    remdesivir IVPB  100 mg IntraVENous Q24H    carbidopa-levodopa  1 tablet Oral TID    sodium chloride flush  5-40 mL IntraVENous 2 times per day     Continuous Infusions:   dextrose 100 mL/hr (05/19/22 0340)    sodium chloride 100 mL/hr at 05/19/22 0843    sodium chloride       CBC:   Recent Labs     05/17/22  0116 05/18/22  0507 05/19/22  0729   WBC 5.6 4.4 4.8   HGB 8.9* 9.4* 8.8*   PLT 94* 87* 84*     BMP:    Recent Labs     05/17/22  0116 05/18/22  0507    133*   K 4.0 3.6*    104   CO2 22 21   BUN 39* 28*   CREATININE 1.62* 1.27*   GLUCOSE 74 86     Hepatic:  Recent Labs     05/17/22  0906 05/18/22  0507   AST 41* 40*   ALT 6 8   BILITOT 0.24* 0.16*   ALKPHOS 37* 37*     Troponin:   Recent Labs     05/16/22  1934 05/17/22  0116 05/17/22  0739   TROPHS 65* 64* 58*     BNP: No results for input(s): BNP in the last 72 hours. Lipids: No results for input(s): CHOL, HDL in the last 72 hours. Invalid input(s): LDLCALCU  INR: No results for input(s): INR in the last 72 hours.        Cardiac history,    EKG: normal sinus rhythm with some lateral ischemic ST   No Stress test, Echo or cardiac cath on Epic    Objective:   Vitals: BP (!) 92/58   Pulse 78   Temp 100.2 °F (37.9 °C) (Temporal)   Resp 20   Ht 5' 9.02\" (1.753 m)   Wt 122 lb (55.3 kg)   SpO2 96%   BMI 18.02 kg/m²   General appearance: alert and cooperative with exam  HEENT: Head: Normocephalic, no lesions, without obvious abnormality. Neck:no JVD, trachea midline, no adenopathy  Lungs: Clear to auscultation  Heart: Regular rate and rhythm, s1/s2 auscultated, no murmurs  Abdomen: soft, non-tender, bowel sounds active  Extremities: no edema  Neurologic: not done        Assessment / Acute Cardiac Problems:   1. History of syncope multifactorial, possible vasovagal, arrhythmogenic, orthostatic changes  1. . Troponin 59-->55-->62  2. KALYAN  3. HTN  4. HLD  5. Parkinson's disease  6. Chronic alcohol abuse       Patient Active Problem List:     Weakness     Syncope and collapse     Orthostatic hypotension     Parkinson's disease ([facility] [address]      Plan of Treatment:   1. Flat troponin likely secondary to KALYAN/ dehydration - BP liable and HR emery with no medication , recommend genlte hydration   2. Awaiting echo to rule out wall motion abnormality or structural heart disease. 3. approached by neurosurgery to put a comment for cardiac clearance Discussed case with Dr. Padilla Cruz , pt is intermediate risk  for surgery   4. Holter monitoring as outpatient to rule out cardiogenic cause of syncope  5.  Keep K>4, Mg >2    Electronically signed by Sherlyn Blizzard, APRN - NP on 5/19/2022 at 11:05 [address] [place] [address].  [phone]

## 2022-05-19 NOTE — CARE COORDINATION
Spoke to Tameka Lacy at the [place] she informs me that he patient is unable to admit to a SNF under his VA benefits. She tells me that if the patient would benefit for ARU they may be able to transfer him to the [place] then to a SNF but if the family is asking for long term, he does not have that benefit. She also tells me that the cousin Leatha Hair has been in contact with her about the same issues. Called Leatha Hair to update her on the above. She confirms that the patient's income is 1600.00 but has not seen the actual stubs that confirm that. I asked about medicare, she states she does not know why the patient does not have Medicare. She is not aware of wether he does or does not have Medicare. Called registration to inquire about Medicare, spoke to Tiffany Heck, she states she will run his SS# and call me back. [facility] returns call stating that the patient does have Medicare and she has added this to the face sheet.

## 2022-05-19 NOTE — PROGRESS NOTES
Neurosurgery SHELLY/Resident    Daily Progress Note   CC:  Chief Complaint   Patient presents with    Loss of Consciousness     5/19/2022  6:29 AM    Chart reviewed. No acute events overnight. No new complaints. Vitals:    05/18/22 2315 05/19/22 0030 05/19/22 0430 05/19/22 0626   BP: (!) 87/54 (!) 86/51 (!) 88/48 (!) 167/148   Pulse: 65 67 65 75   Resp: 18 25 23 20   Temp:  98.6 °F (37 °C) 96.8 °F (36 °C)    TempSrc:  Oral Oral    SpO2: 94%      Weight:       Height:           PE:   Hard of hearing  Following commands  5/5 BLE   5/5 BUE minus right shoulder which is limited   Sensation: intact  Positive hoffmans left hand      Lab Results   Component Value Date    WBC 4.4 05/18/2022    HGB 9.4 (L) 05/18/2022    HCT 27.9 (L) 05/18/2022    PLT 87 (L) 05/18/2022    ALT 8 05/18/2022    AST 40 (H) 05/18/2022     (L) 05/18/2022    K 3.6 (L) 05/18/2022     05/18/2022    CREATININE 1.27 (H) 05/18/2022    BUN 28 (H) 05/18/2022    CO2 21 05/18/2022    TSH 2.93 05/15/2022    CRP 10.2 (H) 05/18/2022         A/P  77 y.o. male who presents with cervical stenosis, cervical myelopathy    Due to recent covid status- patient will need to follow up in the office in 4 weeks for elective surgical case  Ok to be discharged from a neurosurgery standpoint  Neuro checks per floor protocol      Please contact neurosurgery with any changes in patients neurologic status.        Khris Blackwell CNP  5/19/22  6:29 AM

## 2022-05-19 NOTE — PROGRESS NOTES
Physical Therapy  Facility/Department: [address] STEPDOWN  Physical Therapy Daily Treatment Note    Name: Reddy Higginbotham  : 1945  MRN: [id number]  Date of Service: 2022    Discharge Recommendations:  Patient would benefit from continued therapy after discharge   PT Equipment Recommendations  Equipment Needed: Yes  Mobility Devices: Castillo Hair: Rolling      Patient Diagnosis(es): The encounter diagnosis was Syncope and collapse. Past Medical History:  has no past medical history on file. Past Surgical History:  has no past surgical history on file. Assessment   Body Structures, Functions, Activity Limitations Requiring Skilled Therapeutic Intervention: Decreased functional mobility ; Decreased strength;Decreased safe awareness;Decreased cognition;Decreased endurance;Decreased balance;Decreased fine motor control;Decreased coordination;Decreased posture  Assessment: Pt required Keegan for bed mobility and was able to sit EOB with CGA/SBA. Pt stood and ambulated ~5ft to chair with RW and CGA x2 for safety. Pt limited by decreased strength and hx or parkinsons. Would recommend continued PT to address further balance and strength deficits and improve overall functional independence. Therapy Prognosis: Fair  Barriers to Learning: cognition, hearing  Requires PT Follow-Up: Yes  Activity Tolerance  Activity Tolerance: Patient limited by endurance; Patient limited by fatigue     Plan   Plan  Plan:  (5-6x/week)  Current Treatment Recommendations: Strengthening,Balance training,ROM,Functional mobility training,Transfer training,Endurance training,Therapeutic activities,Safety education & training,Equipment evaluation, education, & procurement,Gait training  Safety Devices  Type of Devices: Call light within reach,Gait belt,Chair alarm in place,Left in chair,Nurse notified,Heels elevated for pressure relief  Restraints  Restraints Initially in Place: No Restrictions  Restrictions/Precautions  Restrictions/Precautions: Fall Risk,Up as Tolerated (droplet plus isolation)  Required Braces or Orthoses?: No  Position Activity Restriction  Other position/activity restrictions: hx of Parkinson's, COVID+     Subjective   General  Chart Reviewed: Yes  Patient assessed for rehabilitation services?: Yes  Response To Previous Treatment: Patient with no complaints from previous session. Family / Caregiver Present: No  Follows Commands: Impaired (requires redirection and repetition.)  General Comment  Comments: Pt retired to chair at end of session set up to work with OT  Subjective  Subjective: Pt and RN agreeable to therapy this morning. Pt supine in bed eating some breakfast upon arrival with no c/o pain. Pt pleasant and cooperative throughout session although very [place]. Cognition   Orientation  Overall Orientation Status: Within Functional Limits  Orientation Level: Oriented X4  Cognition  Overall Cognitive Status: Exceptions  Arousal/Alertness: Delayed responses to stimuli  Following Commands: Follows multistep commands with increased time; Follows multistep commands with repitition  Attention Span: Attends with cues to redirect  Memory: Decreased recall of recent events  Safety Judgement: Decreased awareness of need for assistance;Decreased awareness of need for safety  Problem Solving: Assistance required to generate solutions;Assistance required to correct errors made  Insights: Decreased awareness of deficits  Initiation: Requires cues for all  Sequencing: Requires cues for all  Cognition Comment: pt required mod-min verbal cues to maintain attention to task throughout session. Pt. self limiting stating \"I cant throughout session for all tasks\", while combing hair around face pt. stating \"I cant do this\" while pt. was actively combing hair around face.      Objective   Bed mobility  Supine to Sit: Minimal assistance (for trunk progression)  Sit to Supine: Unable to assess  Scooting: Contact guard assistance  Bed Mobility Comments: HOB elevated ~70 degrees, required assistance with trunk progression to EOB. Transfers  Sit to Stand: Contact guard assistance;2 Person Assistance  Stand to sit: Contact guard assistance;2 Person Assistance  Bed to Chair: Contact guard assistance;2 Person Assistance  Comment: Pt performed STS transfer with CGA x2 for safety, pt impulsive throughout session requiring cueing to wait for line management. Pt transferred to chair with CGA x2 throughout for safety. Ambulation  Surface: level tile  Device: Rolling Walker  Assistance: Contact guard assistance (2 person for safety)  Quality of Gait: B knees flexed, forward flexed posture. Gait Deviations: Slow Yoselin;Decreased step length;Shuffles  Distance: 5ft to chair  Comments: Pt overall slow and steady with no noted LOB. More Ambulation?: No  Stairs/Curb  Stairs?: No     Balance  Posture: Fair  Sitting - Static: Fair;+  Sitting - Dynamic: Fair;-  Standing - Static: Fair  Standing - Dynamic: Poor  Comments: Pt sat EOB ~10 minutes prior to ambulation with CGA/SBA. RW used to assess standing balance, pt able to stand ~3 minutes total with CGA for safety. Exercise Treatment:  Seated LE exercises: ankle pumps, LAQ. Reps x10  Comments: Pt performed while seated in chair, pt also performed some BUE exercises with OT.     AM-PAC Score  AM-PAC Inpatient Mobility Raw Score : 17 (05/19/22 1156)  AM-PAC Inpatient T-Scale Score : 42.13 (05/19/22 1156)  Mobility Inpatient CMS 0-100% Score: 50.57 (05/19/22 1156)  Mobility Inpatient CMS G-Code Modifier : CK (05/19/22 1156)          Goals  Short Term Goals  Time Frame for Short term goals: 14 visits  Short term goal 1: Perform bed mobility with Min A  Short term goal 2: Perform sit to stand tranfers with Min A  Short term goal 3: Ambulate 200ft with RW and CGA  Short term goal 4: Demo Fair dynamic standing balance to decrease risk of falls  Short term goal 5: Participate in 30 minutes of therapy to demo increased enduranc  Additional Goals?: No       Education  Patient Education  Education Given To: Patient  Education Provided: Role of Therapy;Plan of Care;Transfer Training;Home Exercise Program  Education Method: Verbal  Barriers to Learning: Cognition; Hearing  Education Outcome: Verbalized understanding;Continued education needed      Therapy Time   Individual Concurrent Group Co-treatment   Time In 0841         Time Out 0915         Minutes 34         Timed Code Treatment Minutes: 23 Minutes (partial co-treat with OT)       [place] PTA

## 2022-05-19 NOTE — PROGRESS NOTES
[facility] -   Infectious diseases evaluation  admission date 5/15/2022    reason for consultation:   covid     Impression :   Current:  · Syncope  · COVID, mild illness  · Parkinson  · KALYAN    Other:  ·   Discussion / summary of stay / plan of care   ·   Recommendations   · remdesevir 5 days 5/17 -5/21 - might stop after 5/20 if much better  · FU LFT  · No other covid tx required  Disc w resident  Infection Control Recommendations   · Universal Precautions  · Droplet plus Isolation      Antimicrobial Stewardship Recommendations   · Simplification of therapy  · Targeted therapy    History of Present Illness:   Initial history:  Reddy Higginbotham is a 77y.o.-year-old male who presented to the hospital 5/15 with a syncopal episode, while he was sitting in his wheelchair. No seizure activity  Was having some cough and  COVID-positive tested before he came in. CXR neg  CT chest neg  - CT AP L3 vertebral body multiple round lesions. pt does have some underlying and uses a wheelchair  Past etoh++    Pt is on RAand very poor historian    Interval changes  5/19/2022   Patient Vitals for the past 8 hrs:   BP Temp Temp src Pulse Resp SpO2   05/19/22 0929 (!) 92/58 -- -- 78 20 --   05/19/22 0645 (!) 89/52 -- -- -- -- --   05/19/22 0626 (!) 167/148 100.2 °F (37.9 °C) Temporal 75 20 96 %   05/19/22 0430 (!) 88/48 96.8 °F (36 °C) Oral 65 23 --   no fever - cough better and feels good  Abd sft  Labs reviewed  Summary of relevant labs:  Labs:  CRP 12 - 10  Creat 1.27  WBC 4.4 - 4.8      Micro:  BC 5/17  U cx 5/17    Imaging:  CT chest and AP  neg for pneumonia   Upper Abdomen: Cholelithiasis and free fluid within peritoneal cavity which may be secondary to ascites. I have personally reviewed the past medical history, past surgical history, medications, social history, and family history, and I haveupdated the database accordingly.       Allergies:   Patient has no known allergies. Review of Systems:     Review of Systems   Constitutional: Positive for activity change. HENT: Positive for congestion. Eyes: Negative for discharge. Respiratory: Positive for cough. Negative for apnea. Cardiovascular: Negative for chest pain. Gastrointestinal: Negative for abdominal distention. Endocrine: Negative for heat intolerance. Genitourinary: Negative for dysuria. Musculoskeletal: Negative for arthralgias. Skin: Negative for color change. Allergic/Immunologic: Negative for immunocompromised state. Neurological: Negative for dizziness, light-headedness and headaches. Hematological: Negative for adenopathy. Psychiatric/Behavioral: Negative for agitation. Physical Examination :       Physical Exam  Constitutional:       Appearance: Normal appearance. He is not ill-appearing or diaphoretic. HENT:      Head: Normocephalic and atraumatic. Nose: Nose normal.      Mouth/Throat:      Mouth: Mucous membranes are moist.   Eyes:      General: No scleral icterus. Conjunctiva/sclera: Conjunctivae normal.      Pupils: Pupils are equal, round, and reactive to light. Cardiovascular:      Rate and Rhythm: Normal rate and regular rhythm. Heart sounds: Normal heart sounds. No murmur heard. Pulmonary:      Effort: No respiratory distress. Breath sounds: Normal breath sounds. No stridor. Abdominal:      General: There is no distension. Palpations: Abdomen is soft. There is no mass. Genitourinary:     Comments: No broussard  Musculoskeletal:         General: No swelling or deformity. Cervical back: Neck supple. No rigidity or tenderness. Skin:     General: Skin is dry. Coloration: Skin is not jaundiced. Neurological:      General: No focal deficit present. Mental Status: He is alert and oriented to person, place, and time. Psychiatric:         Mood and Affect: Mood normal.         Thought Content:  Thought content normal. Past Medical History:   No past medical history on file. Past Surgical  History:   No past surgical history on file. Medications:      magnesium sulfate  2,000 mg IntraVENous Once    vitamin D  50,000 Units Oral Weekly    iron sucrose  200 mg IntraVENous Q24H    sodium chloride  250 mL IntraVENous Once    sennosides-docusate sodium  2 tablet Oral Daily    remdesivir IVPB  100 mg IntraVENous Q24H    carbidopa-levodopa  1 tablet Oral TID    sodium chloride flush  5-40 mL IntraVENous 2 times per day       Social History:     Social History     Socioeconomic History    Marital status: Single     Spouse name: Not on file    Number of children: Not on file    Years of education: Not on file    Highest education level: Not on file   Occupational History    Not on file   Tobacco Use    Smoking status: Not on file    Smokeless tobacco: Not on file   Substance and Sexual Activity    Alcohol use: Not on file    Drug use: Not on file    Sexual activity: Not on file   Other Topics Concern    Not on file   Social History Narrative    Not on file     Social Determinants of Health     Financial Resource Strain:     Difficulty of Paying Living Expenses: Not on file   Food Insecurity:     Worried About Running Out of Food in the Last Year: Not on file    Ashley of Food in the Last Year: Not on file   Transportation Needs:     Lack of Transportation (Medical): Not on file    Lack of Transportation (Non-Medical):  Not on file   Physical Activity:     Days of Exercise per Week: Not on file    Minutes of Exercise per Session: Not on file   Stress:     Feeling of Stress : Not on file   Social Connections:     Frequency of Communication with Friends and Family: Not on file    Frequency of Social Gatherings with Friends and Family: Not on file    Attends Sabianist Services: Not on file    Active Member of Clubs or Organizations: Not on file    Attends Club or Organization Meetings: Not on file [phone]  Perfect serve / office [phone]

## 2022-05-19 NOTE — PROGRESS NOTES
[facility]  Internal Medicine Teaching Residency Program  Inpatient Daily Progress Note  ______________________________________________________________________________    Patient: Salina Sandy  YOB: 1945   XFP:[id number]    Acct: [de-identified]     Room: 86/0993-07  Admit date: 5/15/2022  Today's date: 05/19/22  Number of days in the hospital: 4    SUBJECTIVE   Admitting Diagnosis: Weakness  CC: Syncopal episode  Pt examined at bedside. Chart & results reviewed. Was hypotensive and hypoxic at night  Supplemental O2 was increased to 4 L, s/p midodrine 5 mg once  Currently BP 92/58, SPO2 96% on 2 L supplemental O2  Febrile to 100.2 this a.m.  Echo pending results. NSG: Plan for posterior cervical decompression fusion    ROS:  Constitutional:  negative for chills, fevers, sweats  Respiratory: Positive for shortness of breath   cardiovascular:  negative for chest pain, chest pressure/discomfort, lower extremity edema, palpitations  Gastrointestinal:  negative for abdominal pain, constipation, diarrhea, nausea, vomiting  Neurological:  negative for dizziness, headache  BRIEF HISTORY     The patient is a pleasant 76 y. o. male with past medical history signifiacnt for parkinsonism disease, hypertension and hyperlipidemia presents through EMS with chief complaint of syncope that occurred just prior to arrival.  History was mostly provided by the caretaker the ER resident.      Patient is admitted for syncopal episode work-up.  Likely multifactorial due to dysautonomia, orthostatic hypotension, neurocardiogenic, neurogenic due to cervical spinal stenosis and canal narrowing.  Cardiology consulted for ruling out cardiogenic causes.  Plan for echo.     Dehydration secondary to decreased oral intake causing KALYAN, low blood pressure, orthostatic hypotension.   COVID-positive.  ID following  Neurology: Signed off  NSG: NSG: Plan for posterior cervical decompression fusion. Waiting for cardiology clearance    OBJECTIVE     Vital Signs:  BP (!) 89/52 Comment: repeat  Pulse 75   Temp 100.2 °F (37.9 °C) (Temporal)   Resp 20   Ht 5' 9.02\" (1.753 m)   Wt 122 lb (55.3 kg)   SpO2 96%   BMI 18.02 kg/m²     Temp (24hrs), Av.5 °F (36.9 °C), Min:96.8 °F (36 °C), Max:100.2 °F (37.9 °C)    In: 2990.1   Out: -     Physical Exam:  Constitutional: This is a well developed, well nourished, 17-18.4 - Mild malnutrition / Protein energy malnutrition Grade I 77y.o. year old male who is alert, oriented, cooperative and in no apparent distress. Mask facies. Slow to respond. AAO x2. Head:normocephalic and atraumatic. EENT:  PERRLA. No conjunctival injections. Septum was midline,   Neck: Supple without thyromegaly. No elevated JVP. Trachea was midline. Respiratory: Chest was symmetrical without dullness to percussion. Breath sounds bilaterally were clear to auscultation. There were no wheezes, rhonchi or rales. There is no intercostal retraction or use of accessory muscles. No egophony noted. Cardiovascular: Regular without murmur, clicks, gallops or rubs. Abdomen: Slightly rounded and soft without organomegaly. No rebound, rigidity or guarding was appreciated. Lymphatic: No lymphadenopathy. Musculoskeletal: Normal curvature of the spine. No gross muscle weakness. Extremities:  No lower extremity edema, ulcerations, tenderness, varicosities or erythema. Muscle size, tone and strength are normal.  No involuntary movements are noted. Skin:  Warm and dry. Good color, turgor and pigmentation. No lesions or scars.   No cyanosis or clubbing  Neurological/Psychiatric: The patient's general behavior, level of consciousness, thought content and emotional status is normal.        Medications:  Scheduled Medications:    vitamin D  50,000 Units Oral Weekly    iron sucrose  200 mg IntraVENous Q24H    sodium chloride  250 mL IntraVENous Once    sennosides-docusate sodium  2 tablet Oral Daily    remdesivir IVPB  100 mg IntraVENous Q24H    carbidopa-levodopa  1 tablet Oral TID    sodium chloride flush  5-40 mL IntraVENous 2 times per day     Continuous Infusions:    dextrose 100 mL/hr (05/19/22 0340)    sodium chloride Stopped (05/19/22 0040)    sodium chloride       PRN Medicationssodium chloride, 30 mL, PRN  glucose, 4 tablet, PRN  dextrose bolus, 125 mL, PRN   Or  dextrose bolus, 250 mL, PRN  glucagon (rDNA), 1 mg, PRN  dextrose, 100 mL/hr, PRN  sodium chloride flush, 10 mL, PRN  sodium chloride flush, 5-40 mL, PRN  sodium chloride, , PRN  ondansetron, 4 mg, Q8H PRN   Or  ondansetron, 4 mg, Q6H PRN  polyethylene glycol, 17 g, Daily PRN  acetaminophen, 650 mg, Q6H PRN   Or  acetaminophen, 650 mg, Q6H PRN        Diagnostic Labs:  CBC:   Recent Labs     05/17/22  0116 05/18/22  0507   WBC 5.6 4.4   RBC 2.90* 3.05*   HGB 8.9* 9.4*   HCT 26.5* 27.9*   MCV 91.4 91.5   RDW 13.7 13.9   PLT 94* 87*     BMP:   Recent Labs     05/17/22  0116 05/18/22  0507    133*   K 4.0 3.6*    104   CO2 22 21   BUN 39* 28*   CREATININE 1.62* 1.27*     BNP: No results for input(s): BNP in the last 72 hours. PT/INR: No results for input(s): PROTIME, INR in the last 72 hours. APTT: No results for input(s): APTT in the last 72 hours. CARDIAC ENZYMES: No results for input(s): CKMB, CKMBINDEX, TROPONINI in the last 72 hours. Invalid input(s): CKTOTAL;3  FASTING LIPID PANEL:No results found for: CHOL, HDL, TRIG  LIVER PROFILE:   Recent Labs     05/17/22  0906 05/18/22  0507   AST 41* 40*   ALT 6 8   BILIDIR 0.11 <0.08   BILITOT 0.24* 0.16*   ALKPHOS 37* 37*      MICROBIOLOGY:   Lab Results   Component Value Date/Time    CULTURE NO GROWTH 1 DAY 05/17/2022 09:05 AM       Imaging:    CT Head WO Contrast    Result Date: 5/15/2022  Markedly limited exam due to motion artifact. No gross acute intracranial abnormality.      CT Cervical Spine WO Contrast    Result Date: 5/15/2022  No acute abnormality of the cervical spine. Advanced multilevel degenerative disc disease and facet arthropathy. CT Lumbar Spine WO Contrast    Result Date: 5/15/2022  Chest CT angiogram: No evidence of pulmonary embolism or acute pulmonary abnormality. Cholelithiasis. Moderate free fluid within peritoneal cavity may be suggestive of ascites. CT of thoracic spine: No acute osseous abnormality. No fracture. CT of lumbar spine: No acute osseous abnormality. No fracture. Mild levoscoliosis of thoracolumbar junction. Multiple round hypodense lesions are noted throughout the lumbar spine most pronounced within L3 vertebral body. Findings may be related to osteopenia and subchondral cystic change. Marrow infiltrative lesions cannot be completely excluded. RECOMMENDATIONS: Unavailable     MRI CERVICAL SPINE WO CONTRAST    Result Date: 5/17/2022  1. Moderate spinal canal stenosis and severe bilateral neural foraminal narrowing at C4-C5 secondary to a posterior disc osteophyte complex, uncovertebral overgrowth and facet arthropathy. There is mild increased T2 signal intensity within the spinal cord at this level suggesting spondylotic myelopathy. 2. Moderate spinal canal stenosis and severe bilateral neural foraminal narrowing at C3-C4, as described above. 3. Mild spinal canal stenosis from C5-C6 to C7-T1. 4. Severe bilateral neural foraminal narrowing at C6-C7 and C7-T1, as described above. MRI LUMBAR SPINE W WO CONTRAST    Result Date: 5/15/2022  The previously noted round hypodense lesions throughout the lumbar spine correspond to subchondral cystic change/degenerative findings. No evidence of marrow infiltrative lesion is noted. Partial visualization of a massive cystic structure within the right side of peritoneal cavity, measuring approximately 14 cm. The lesion does not appear to be arising from the right kidney. If there is need for further evaluation, CT of the abdomen and pelvis can be obtained.  At L5-S1, grade 1 anterolisthesis, bilateral pars defects and severe bilateral neural foraminal narrowing RECOMMENDATIONS: Unavailable     XR CHEST PORTABLE    Result Date: 5/17/2022  No acute cardiopulmonary findings. XR CHEST PORTABLE    Result Date: 5/15/2022  No acute cardiopulmonary disease. CT CHEST PULMONARY EMBOLISM W CONTRAST    Result Date: 5/15/2022  Chest CT angiogram: No evidence of pulmonary embolism or acute pulmonary abnormality. Cholelithiasis. Moderate free fluid within peritoneal cavity may be suggestive of ascites. CT of thoracic spine: No acute osseous abnormality. No fracture. CT of lumbar spine: No acute osseous abnormality. No fracture. Mild levoscoliosis of thoracolumbar junction. Multiple round hypodense lesions are noted throughout the lumbar spine most pronounced within L3 vertebral body. Findings may be related to osteopenia and subchondral cystic change. Marrow infiltrative lesions cannot be completely excluded. RECOMMENDATIONS: Unavailable     XR CERVICAL SPINE FLEXION AND EXTENSION    Result Date: 5/17/2022  Advanced multilevel spondylosis without any findings of dynamic instability. Overall limited range of motion. MRI BRAIN WO CONTRAST    Result Date: 5/17/2022  1. No acute infarct or acute intracranial process identified. 2. Diffuse cerebral volume loss and moderate chronic small vessel ischemic changes. FL MODIFIED BARIUM SWALLOW W VIDEO    Result Date: 5/17/2022  1. No aspiration. 2. Deep laryngeal penetration of nectar thick liquid and thin liquid. Please see separate speech pathology report for full discussion of findings and recommendations. CT THORACIC SPINE TRAUMA RECONSTRUCTION    Result Date: 5/15/2022  Chest CT angiogram: No evidence of pulmonary embolism or acute pulmonary abnormality. Cholelithiasis. Moderate free fluid within peritoneal cavity may be suggestive of ascites. CT of thoracic spine: No acute osseous abnormality. No fracture.  CT of lumbar spine: No acute osseous abnormality. No fracture. Mild levoscoliosis of thoracolumbar junction. Multiple round hypodense lesions are noted throughout the lumbar spine most pronounced within L3 vertebral body. Findings may be related to osteopenia and subchondral cystic change. Marrow infiltrative lesions cannot be completely excluded. RECOMMENDATIONS: Unavailable       ASSESSMENT & PLAN     Principal Problem:    Weakness  Active Problems:    Syncope and collapse    Hypotension    Parkinson's disease ([address]    COVID-19    KALYAN (acute kidney injury) (HCC)    Cervical myelopathy (HCC)    Orthostatic hypertension    Iron deficiency anemia  Resolved Problems:    * No resolved hospital problems. *    1. Syncope and collapse.  Multifactorial.  Likely 2/2 orthostatic hypotension 2/2 dysautonomia due to Parkinson's disease/dehydration vs cervical myelopathy vs cardiogenic vs COVID infection.  MRI cervical spine shows cervical canal narrowing. maintenance fluid @ 100 ml/hr. Monitor BP. Echo completed. Pending results. Holter monitoring as OP. Follow on  cardio recomnds  2. Parkinson's disease.  Continue on Sinemet 1 tablet 3 times daily. 3. COVID-19 infection, currently on supplemental O2 2L via NC.  Airborne precautions.  Remdesivir for 5 days. Follow on ID recomnds. 4. KALYAN.  Continue on  mL/h. Creatinine trending down. Monitor output  5. Orthostatic hypotension.  Continue on maintenance fluid.  Walk with assistance.  Fall precautions.  Follow-up on cardiology recommendations. 6. Chronic alcoholism.  Multivitamin and folate supplementation.     DVT ppx : None, heparin held by neurology  GI ppx: Tums     PT/OT: Following  Discharge Planning / SW: CM to assist with    Braden Tucker MD  Internal Medicine Resident, [facility]-  [facility];  [place], [place]  5/19/2022, 7:11 AM   Attending Physician Statement  I have discussed the care of Eliseo Michelle, including pertinent history and exam findings,  with the resident. I have seen and examined the patient and the key elements of all parts of the encounter have been performed by me. I agree with the assessment, plan and orders as documented by the resident with additions . Treatment plan Discussed with nursing staff in detail , all questions answered . Electronically signed by Ana Evans MD on   5/19/22 at 4:20 PM EDT    Please note that this chart was generated using voice recognition Dragon dictation software. Although every effort was made to ensure the accuracy of this automated transcription, some errors in transcription may have occurred.

## 2022-05-19 NOTE — PLAN OF CARE
Problem: Discharge Planning  Goal: Discharge to home or other facility with appropriate resources  5/19/2022 1217 by Rgio Kelly RN  Outcome: Progressing     Problem: Skin/Tissue Integrity  Goal: Absence of new skin breakdown  Description: 1. Monitor for areas of redness and/or skin breakdown  2. Assess vascular access sites hourly  3. Every 4-6 hours minimum:  Change oxygen saturation probe site  4. Every 4-6 hours:  If on nasal continuous positive airway pressure, respiratory therapy assess nares and determine need for appliance change or resting period. 5/19/2022 1217 by Rigo Kelly RN  Outcome: Progressing     Problem: Safety - Adult  Goal: Free from fall injury  5/19/2022 1217 by Rigo Kelly RN  Outcome: Progressing     Problem: Confusion  Goal: Confusion, delirium, dementia, or psychosis is improved or at baseline  Description: INTERVENTIONS:  1. Assess for possible contributors to thought disturbance, including medications, impaired vision or hearing, underlying metabolic abnormalities, dehydration, psychiatric diagnoses, and notify attending LIP  2. [place] high risk fall precautions, as indicated  3. Provide frequent short contacts to provide reality reorientation, refocusing and direction  4. Decrease environmental stimuli, including noise as appropriate  5. Monitor and intervene to maintain adequate nutrition, hydration, elimination, sleep and activity  6. If unable to ensure safety without constant attention obtain sitter and review sitter guidelines with assigned personnel  7.  Initiate Psychosocial CNS and Spiritual Care consult, as indicated  5/19/2022 1217 by Rigo Kelly RN  Outcome: Progressing

## 2022-05-19 NOTE — PROGRESS NOTES
Occupational Therapy  Facility/Department: [address] [facility]  Occupational Therapy Daily Progress Note    Name: Carol Mendoza  : 1945  MRN: [id number]  Date of Service: 2022    Discharge Recommendations: Pt. Would benefit from further skilled OT services to enhance functional outcomes. Patient would benefit from continued therapy after discharge          Patient Diagnosis(es): The encounter diagnosis was Syncope and collapse. Past Medical History:  has no past medical history on file. Past Surgical History:  has no past surgical history on file. Treatment Diagnosis: syncope      Assessment   Performance deficits / Impairments: Decreased functional mobility ; Decreased ADL status; Decreased endurance;Decreased high-level IADLs;Decreased cognition;Decreased balance;Decreased strength;Decreased fine motor control  Assessment: pt would be unsafe to return to prior living arrangement at this time d/t increased need for A for functional transfers and ADL completion on this date. pt would benefit from further therapy at discharge in order to increase ability to completeADLS and functional transfers/mobility  Treatment Diagnosis: syncope  Prognosis: Fair  Decision Making: High Complexity  REQUIRES OT FOLLOW-UP: Yes  Activity Tolerance  Activity Tolerance: Patient limited by fatigue;Patient Tolerated treatment well        Pain Assessment  Pain Assessment: 0-10  Pain Level: 5/10  Pain Location: Per pt. Chronic back pain  Non-Pharmaceutical Pain Intervention(s): Repositioned; Therapeutic presence;Distraction; Ambulation/Increased Activity      Plan   Plan  Times per Week: 3-4x/wk  Current Treatment Recommendations: [place] Boot training,Neuromuscular re-education,Self-Care / ADL,Patient/Caregiver education & training     Restrictions  Restrictions/Precautions  Restrictions/Precautions: Fall Risk,Up as Tolerated  Required Braces or Orthoses?: No  Position Activity Restriction  Other position/activity restrictions: hx of Parkinson's    Subjective   General  Patient assessed for rehabilitation services?: Yes  Family / Caregiver Present: No  Diagnosis: syncope  General Comment  Comments: RN ok'd for therapy this morning. pt agreeable to participate in session and cooperative/pleasant throughout, although lethargic. pt denied pain throughout session    Objective           Safety Devices  Type of Devices: Call light within reach;Gait belt; Chair alarm in place; Left in chair;Nurse notified; Heels elevated for pressure relief  Restraints  Restraints Initially in Place: No  Bed Mobility Training  Bed Mobility Training: Yes  Overall Level of Assistance: Minimum assistance  Supine to Sit: Minimum assistance (Min A for trunk progression, HOB elevated ~80 degree, slow moving)  Scooting: Contact-guard assistance  Balance  Sitting: With support (CGA to ensure stability, static/dynamic sitting EOB/recliner chair, ~35 minutes while engaged in therapeutic activity.)  Standing: With support (CGA x2 + RW, ~5 minutes, static/dynamic/transfers, No LOB, pt. is unsteady and weak)  Transfer Training  Transfer Training: Yes  Overall Level of Assistance: Assist X2;Contact-guard assistance; Additional time (CGA x2 for safety d/t overall weakness and fatigue + RW)  Interventions: Safety awareness training;Verbal cues  Sit to Stand: Assist X2;Contact-guard assistance; Additional time  Stand to Sit: Assist X2;Contact-guard assistance; Additional time  Stand Pivot Transfers: Assist X2;Contact-guard assistance; Additional time        ADL  Feeding: RN feeding pt. Breakfast upon entering room, per pt, \"I feed myself at home\". Later in session pt. Asking for a drink, pt. Requesting A, when pt. Encouraged to try to take a drink, pt Needing hand over hand + min A to tilt cup to mouth, d/t B hand tremors/weakness. Grooming:  Moderate assistance;Verbal cueing;Minimal assistance  Grooming Skilled Clinical Factors: Sitting in recliner chair, pt. able to wash face at CGA level around mouth and min A level eye level and up, comb hair around face at CGA level, Needing mod A to wash hair and comb back of head d/t decreased B shoulder ROM. Max cues for initiation and hand over hand at times. UE Bathing: Moderate assistance;Verbal cueing;Setup  UE Bathing Skilled Clinical Factors: Mod A to bathe UE d/t decreased B UE ROM and pt. self limiting. UE Dressing: Maximum assistance; Moderate assistance  UE Dressing Skilled Clinical Factors: Max-mod A to doff/don gown d/t weakness and decreased B UE ROM  LE Dressing Skilled Clinical Factors: Max A to doff/don B socks sitting EOB, d/t decreased B hip flexion  Toileting: Maximum assistance  Toileting Skilled Clinical Factors: Max A to change wet brief, and max encouragement for kelsy care once pt. sitting in recliner. Additional Comments: Pt. alexx P tolerance to ADL session, needing extended time and effort with max cues for initiation to task d/t weakness and pt. self limiting. Cognition  Overall Cognitive Status: Exceptions  Arousal/Alertness: Delayed responses to stimuli  Following Commands: Follows multistep commands with increased time; Follows multistep commands with repitition  Attention Span: Attends with cues to redirect  Memory: Decreased recall of recent events  Safety Judgement: Decreased awareness of need for assistance;Decreased awareness of need for safety  Problem Solving: Assistance required to generate solutions;Assistance required to correct errors made  Insights: Decreased awareness of deficits  Initiation: Requires cues for all  Sequencing: Requires cues for all  Cognition Comment: pt required mod-min verbal cues to maintain attention to task throughout session. Pt. self limiting stating \"I cant throughout session for all tasks\", while combing hair around front of head pt. stating \"I cant do this\" while pt. was actively combing hair around front of head. Education Given To: Patient  Education Provided: Role of Therapy;Plan of Care  Education Provided Comments: safety during functional transfers/functional mobility, Benefits of OOB activity, 4 figure tech, importance of atempting task to enhance I, benefits of therapy, Pt. demo F/P carryover.   Education Method: Verbal;Demonstration  Barriers to Learning: Cognition  Education Outcome: Continued education needed                    AM-PAC Score        AM-PAC Inpatient Daily Activity Raw Score: 15 (05/19/22 1056)  AM-PAC Inpatient ADL T-Scale Score : 34.69 (05/19/22 1056)  ADL Inpatient CMS 0-100% Score: 56.46 (05/19/22 1056)  ADL Inpatient CMS G-Code Modifier : CK (05/19/22 1056)    Goals  Short Term Goals  Time Frame for Short term goals: pt will, by discharge  Short Term Goal 1: complete LB ADLs and toileting tasks with mod A, set up and aE, as needed  Short Term Goal 2: complete UB ADLs and grooming tasks with SBA And set up  Short Term Goal 3: increase activity tolerance to 15+ minutes in order to participate in daily tasks  Short Term Goal 4: maintain attention to task for ~4 minutes with 1-2 verbal cues for redirection  Short Term Goal 5: follow 90% of simple commands with 1-2 verbal cues for initiation and sequencing  Short Term Goal 6: dem mod A during functional transfers with LRD ,as needed  Long Term Goals  Time Frame for Long term goals : NOTIFY OTR TO UPDATE GOALS AS APPROPRIATE       Therapy Time   Individual Concurrent Group Co-treatment   Time In 0842         Time Out 0940         Minutes 58         Timed Code Treatment Minutes: [address], [place]/L

## 2022-05-20 ENCOUNTER — APPOINTMENT (OUTPATIENT)
Dept: GENERAL RADIOLOGY | Age: 77
DRG: 056 | End: 2022-05-20
Payer: OTHER GOVERNMENT

## 2022-05-20 PROBLEM — D69.6 THROMBOCYTOPENIA (HCC): Status: ACTIVE | Noted: 2022-05-20

## 2022-05-20 PROBLEM — R55 SYNCOPE AND COLLAPSE: Status: RESOLVED | Noted: 2022-05-15 | Resolved: 2022-05-20

## 2022-05-20 PROBLEM — I95.9 HYPOTENSION: Status: RESOLVED | Noted: 2022-05-16 | Resolved: 2022-05-20

## 2022-05-20 PROBLEM — N17.9 AKI (ACUTE KIDNEY INJURY) (HCC): Status: RESOLVED | Noted: 2022-05-17 | Resolved: 2022-05-20

## 2022-05-20 PROBLEM — I10 ORTHOSTATIC HYPERTENSION: Status: RESOLVED | Noted: 2022-05-17 | Resolved: 2022-05-20

## 2022-05-20 LAB
ABSOLUTE EOS #: 0.1 K/UL (ref 0–0.4)
ABSOLUTE EOS #: 0.12 K/UL (ref 0–0.44)
ABSOLUTE IMMATURE GRANULOCYTE: 0 K/UL (ref 0–0.3)
ABSOLUTE IMMATURE GRANULOCYTE: <0.03 K/UL (ref 0–0.3)
ABSOLUTE LYMPH #: 0.26 K/UL (ref 1–4.8)
ABSOLUTE LYMPH #: 0.89 K/UL (ref 1.1–3.7)
ABSOLUTE MONO #: 0.2 K/UL (ref 0.1–0.8)
ABSOLUTE MONO #: 0.38 K/UL (ref 0.1–1.2)
ABSOLUTE RETIC #: 0.04 M/UL (ref 0.03–0.08)
ALBUMIN SERPL-MCNC: 2.4 G/DL (ref 3.5–5.2)
ALBUMIN/GLOBULIN RATIO: 1.1 (ref 1–2.5)
ALP BLD-CCNC: 41 U/L (ref 40–129)
ALT SERPL-CCNC: 6 U/L (ref 5–41)
ANION GAP SERPL CALCULATED.3IONS-SCNC: 4 MMOL/L (ref 9–17)
AST SERPL-CCNC: 28 U/L
BASOPHILS # BLD: 0 % (ref 0–2)
BASOPHILS # BLD: 1 % (ref 0–2)
BASOPHILS ABSOLUTE: 0 K/UL (ref 0–0.2)
BASOPHILS ABSOLUTE: 0.04 K/UL (ref 0–0.2)
BILIRUB SERPL-MCNC: 0.23 MG/DL (ref 0.3–1.2)
BILIRUBIN DIRECT: 0.1 MG/DL
BILIRUBIN, INDIRECT: 0.13 MG/DL (ref 0–1)
BUN BLDV-MCNC: 20 MG/DL (ref 8–23)
C-REACTIVE PROTEIN: 28.2 MG/L (ref 0–5)
CALCIUM SERPL-MCNC: 7.1 MG/DL (ref 8.6–10.4)
CHLORIDE BLD-SCNC: 109 MMOL/L (ref 98–107)
CO2: 22 MMOL/L (ref 20–31)
CREAT SERPL-MCNC: 0.92 MG/DL (ref 0.7–1.2)
EOSINOPHILS RELATIVE PERCENT: 2 % (ref 1–4)
EOSINOPHILS RELATIVE PERCENT: 2 % (ref 1–4)
GFR AFRICAN AMERICAN: >60 ML/MIN
GFR NON-AFRICAN AMERICAN: >60 ML/MIN
GFR SERPL CREATININE-BSD FRML MDRD: ABNORMAL ML/MIN/{1.73_M2}
GLUCOSE BLD-MCNC: 107 MG/DL (ref 75–110)
GLUCOSE BLD-MCNC: 69 MG/DL (ref 75–110)
GLUCOSE BLD-MCNC: 70 MG/DL (ref 75–110)
GLUCOSE BLD-MCNC: 72 MG/DL (ref 75–110)
GLUCOSE BLD-MCNC: 75 MG/DL (ref 75–110)
GLUCOSE BLD-MCNC: 80 MG/DL (ref 70–99)
GLUCOSE BLD-MCNC: 83 MG/DL (ref 75–110)
HCT VFR BLD CALC: 28.2 % (ref 40.7–50.3)
HCT VFR BLD CALC: 30 % (ref 40.7–50.3)
HEMOGLOBIN: 9.5 G/DL (ref 13–17)
HEMOGLOBIN: 9.8 G/DL (ref 13–17)
IMMATURE GRANULOCYTES: 0 %
IMMATURE GRANULOCYTES: 0 %
IMMATURE RETIC FRACT: 10.9 % (ref 2.7–18.3)
LYMPHOCYTES # BLD: 18 % (ref 24–43)
LYMPHOCYTES # BLD: 5 % (ref 24–44)
MAGNESIUM: 2.1 MG/DL (ref 1.6–2.6)
MCH RBC QN AUTO: 29.6 PG (ref 25.2–33.5)
MCH RBC QN AUTO: 30.5 PG (ref 25.2–33.5)
MCHC RBC AUTO-ENTMCNC: 32.7 G/DL (ref 28.4–34.8)
MCHC RBC AUTO-ENTMCNC: 33.7 G/DL (ref 28.4–34.8)
MCV RBC AUTO: 90.6 FL (ref 82.6–102.9)
MCV RBC AUTO: 90.7 FL (ref 82.6–102.9)
MONOCYTES # BLD: 4 % (ref 1–7)
MONOCYTES # BLD: 8 % (ref 3–12)
MORPHOLOGY: NORMAL
NRBC AUTOMATED: 0 PER 100 WBC
NRBC AUTOMATED: 0 PER 100 WBC
PDW BLD-RTO: 14 % (ref 11.8–14.4)
PDW BLD-RTO: 14.1 % (ref 11.8–14.4)
PLATELET # BLD: 92 K/UL (ref 138–453)
PLATELET # BLD: ABNORMAL K/UL (ref 138–453)
PLATELET, FLUORESCENCE: 84 K/UL (ref 138–453)
PLATELET, IMMATURE FRACTION: 8.4 % (ref 1.1–10.3)
PMV BLD AUTO: 13 FL (ref 8.1–13.5)
POTASSIUM SERPL-SCNC: 3.4 MMOL/L (ref 3.7–5.3)
RBC # BLD: 3.11 M/UL (ref 4.21–5.77)
RBC # BLD: 3.31 M/UL (ref 4.21–5.77)
RETIC %: 1.1 % (ref 0.5–1.9)
RETIC HEMOGLOBIN: 33.9 PG (ref 28.2–35.7)
SEG NEUTROPHILS: 71 % (ref 36–65)
SEG NEUTROPHILS: 89 % (ref 36–66)
SEGMENTED NEUTROPHILS ABSOLUTE COUNT: 3.61 K/UL (ref 1.5–8.1)
SEGMENTED NEUTROPHILS ABSOLUTE COUNT: 4.54 K/UL (ref 1.8–7.7)
SODIUM BLD-SCNC: 135 MMOL/L (ref 135–144)
TOTAL PROTEIN: 4.6 G/DL (ref 6.4–8.3)
WBC # BLD: 5.1 K/UL (ref 3.5–11.3)
WBC # BLD: 5.1 K/UL (ref 3.5–11.3)

## 2022-05-20 PROCEDURE — 85055 RETICULATED PLATELET ASSAY: CPT

## 2022-05-20 PROCEDURE — 99232 SBSQ HOSP IP/OBS MODERATE 35: CPT | Performed by: INTERNAL MEDICINE

## 2022-05-20 PROCEDURE — 86140 C-REACTIVE PROTEIN: CPT

## 2022-05-20 PROCEDURE — 2500000003 HC RX 250 WO HCPCS: Performed by: INTERNAL MEDICINE

## 2022-05-20 PROCEDURE — 6370000000 HC RX 637 (ALT 250 FOR IP): Performed by: STUDENT IN AN ORGANIZED HEALTH CARE EDUCATION/TRAINING PROGRAM

## 2022-05-20 PROCEDURE — 71045 X-RAY EXAM CHEST 1 VIEW: CPT

## 2022-05-20 PROCEDURE — 2580000003 HC RX 258: Performed by: INTERNAL MEDICINE

## 2022-05-20 PROCEDURE — 2060000000 HC ICU INTERMEDIATE R&B

## 2022-05-20 PROCEDURE — 82947 ASSAY GLUCOSE BLOOD QUANT: CPT

## 2022-05-20 PROCEDURE — 80076 HEPATIC FUNCTION PANEL: CPT

## 2022-05-20 PROCEDURE — 36415 COLL VENOUS BLD VENIPUNCTURE: CPT

## 2022-05-20 PROCEDURE — 2580000003 HC RX 258: Performed by: STUDENT IN AN ORGANIZED HEALTH CARE EDUCATION/TRAINING PROGRAM

## 2022-05-20 PROCEDURE — 85025 COMPLETE CBC W/AUTO DIFF WBC: CPT

## 2022-05-20 PROCEDURE — 85045 AUTOMATED RETICULOCYTE COUNT: CPT

## 2022-05-20 PROCEDURE — 6360000002 HC RX W HCPCS: Performed by: STUDENT IN AN ORGANIZED HEALTH CARE EDUCATION/TRAINING PROGRAM

## 2022-05-20 PROCEDURE — 83735 ASSAY OF MAGNESIUM: CPT

## 2022-05-20 PROCEDURE — 2580000003 HC RX 258

## 2022-05-20 PROCEDURE — 80048 BASIC METABOLIC PNL TOTAL CA: CPT

## 2022-05-20 RX ORDER — LANOLIN ALCOHOL/MO/W.PET/CERES
325 CREAM (GRAM) TOPICAL
Refills: 3 | DISCHARGE
Start: 2022-05-20 | End: 2022-05-26 | Stop reason: SDUPTHER

## 2022-05-20 RX ORDER — ERGOCALCIFEROL 1.25 MG/1
50000 CAPSULE ORAL WEEKLY
DISCHARGE
Start: 2022-05-25 | End: 2022-07-07

## 2022-05-20 RX ORDER — ENOXAPARIN SODIUM 100 MG/ML
40 INJECTION SUBCUTANEOUS DAILY
Status: DISCONTINUED | OUTPATIENT
Start: 2022-05-20 | End: 2022-05-27 | Stop reason: HOSPADM

## 2022-05-20 RX ORDER — LACTOSE-REDUCED FOOD
1 LIQUID (ML) ORAL 2 TIMES DAILY
DISCHARGE
Start: 2022-05-20 | End: 2022-05-26 | Stop reason: SDUPTHER

## 2022-05-20 RX ORDER — SENNA AND DOCUSATE SODIUM 50; 8.6 MG/1; MG/1
2 TABLET, FILM COATED ORAL DAILY
DISCHARGE
Start: 2022-05-21 | End: 2022-05-26

## 2022-05-20 RX ADMIN — DEXTROSE MONOHYDRATE 125 ML: 100 INJECTION, SOLUTION INTRAVENOUS at 22:32

## 2022-05-20 RX ADMIN — IRON SUCROSE 200 MG: 20 INJECTION, SOLUTION INTRAVENOUS at 08:44

## 2022-05-20 RX ADMIN — SODIUM CHLORIDE, PRESERVATIVE FREE 10 ML: 5 INJECTION INTRAVENOUS at 08:34

## 2022-05-20 RX ADMIN — CARBIDOPA AND LEVODOPA 1 TABLET: 25; 100 TABLET ORAL at 13:57

## 2022-05-20 RX ADMIN — REMDESIVIR 100 MG: 100 INJECTION, POWDER, LYOPHILIZED, FOR SOLUTION INTRAVENOUS at 16:34

## 2022-05-20 RX ADMIN — DOCUSATE SODIUM 50 MG AND SENNOSIDES 8.6 MG 2 TABLET: 8.6; 5 TABLET, FILM COATED ORAL at 08:34

## 2022-05-20 RX ADMIN — SODIUM CHLORIDE: 9 INJECTION, SOLUTION INTRAVENOUS at 20:58

## 2022-05-20 RX ADMIN — CARBIDOPA AND LEVODOPA 1 TABLET: 25; 100 TABLET ORAL at 08:34

## 2022-05-20 RX ADMIN — ENOXAPARIN SODIUM 40 MG: 100 INJECTION SUBCUTANEOUS at 13:57

## 2022-05-20 RX ADMIN — SODIUM CHLORIDE: 9 INJECTION, SOLUTION INTRAVENOUS at 08:43

## 2022-05-20 RX ADMIN — SODIUM CHLORIDE, PRESERVATIVE FREE 10 ML: 5 INJECTION INTRAVENOUS at 20:30

## 2022-05-20 RX ADMIN — CARBIDOPA AND LEVODOPA 1 TABLET: 25; 100 TABLET ORAL at 20:30

## 2022-05-20 ASSESSMENT — ENCOUNTER SYMPTOMS
ABDOMINAL DISTENTION: 0
EYE DISCHARGE: 0
COUGH: 1
COLOR CHANGE: 0
APNEA: 0

## 2022-05-20 NOTE — DISCHARGE INSTR - COC
Continuity of Care Form    Patient Name: Sinan Doctor   :  1945  MRN:  [id number]    Admit date:  5/15/2022  Discharge date:  ***    Code Status Order: Full Code   Advance Directives:      Admitting Physician:  Ana Evans MD  PCP: No primary care provider on file. Discharging Nurse: [facility] Unit/Room#: 1420/8286-86  Discharging Unit Phone Number: ***    Emergency Contact:   Extended Emergency Contact Information  Primary Emergency Contact: Lindy Garcia  Home Phone: [phone]  Relation: Brother/Sister   needed? No  Secondary Emergency ContactGeorgina Evans  Home Phone: [phone]  Relation: Other Relative   needed? No    Past Surgical History:  No past surgical history on file. Immunization History: There is no immunization history on file for this patient.     Active Problems:  Patient Active Problem List   Diagnosis Code    Weakness R53.1    Syncope and collapse R55    Hypotension I95.9    Parkinson's disease ([facility] [address] G20    COVID-19 U07.1    KALYAN (acute kidney injury) (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    Hypertension I10    Iron deficiency anemia D50.9       Isolation/Infection:   Isolation            Droplet Plus          Patient Infection Status       Infection Onset Added Last Indicated Last Indicated By Review Planned Expiration Resolved Resolved By    COVID-19 22 COVID-19, Rapid 22      Resolved    COVID-19 (Rule Out) 22 COVID-19, Rapid (Ordered)   22 Rule-Out Test Resulted            Nurse Assessment:  Last Vital Signs: BP (!) 169/63   Pulse 71   Temp 97.8 °F (36.6 °C) (Oral)   Resp 18   Ht 5' 9.02\" (1.753 m)   Wt 122 lb (55.3 kg)   SpO2 97%   BMI 18.02 kg/m²     Last documented pain score (0-10 scale): Pain Level: 6  Last Weight:   Wt Readings from Last 1 Encounters:   22 122 lb (55.3 kg)     Mental Status: disoriented, oriented, alert, coherent, and repeats self at times. IV Access:  - None    Nursing Mobility/ADLs:  Walking   Assisted  Transfer  Assisted  Bathing  Assisted  Dressing  Assisted  Toileting  Dependent  Feeding  Dependent  Med Admin  Dependent  Med Delivery   crushed and in NG    Wound Care Documentation and Therapy:        Elimination:  Continence: Bowel: GR:18857}  Bladder:  HM:64776}  Urinary Catheter: None   Colostomy/Ileostomy/Ileal Conduit: {TA:20275}       Date of Last BM: 5/26/22    Intake/Output Summary (Last 24 hours) at 5/20/2022 1103  Last data filed at 5/19/2022 2236  Gross per 24 hour   Intake 110 ml   Output --   Net 110 ml     I/O last 3 completed shifts: In: 3200.1 [P.O.:210; I.V.:2329.5; IV Piggyback:660.5]  Out: -     Safety Concerns:      At Risk for Falls and Aspiration Risk    Impairments/Disabilities:      Speech, Vision, Hearing, and tremors    Nutrition Therapy:  Current Nutrition Therapy:   - Tube Feedings:  Standard with fiber    Routes of Feeding: Nasogastric  Liquids: NPO  Daily Fluid Restriction: no  Last Modified Barium Swallow with Video (Video Swallowing Test): done on 5/24/2022    Treatments at the Time of Hospital Discharge:   Respiratory Treatments: ***  Oxygen Therapy:  {Therapy; copd oxygen:85664}  Ventilator:    - No ventilator support    Rehab Therapies: {THERAPEUTIC INTERVENTION:3397459319}  Weight Bearing Status/Restrictions: No weight bearing restrictions  Other Medical Equipment (for information only, NOT a DME order):  {EQUIPMENT:697895914}  Other Treatments: ***    Patient's personal belongings (please select all that are sent with patient):  None    RN SIGNATURE:  Electronically signed by Jose Waddell RN on 5/27/22 at 9:33 AM EDT    CASE MANAGEMENT/SOCIAL WORK SECTION    Inpatient Status Date: ***    Readmission Risk Assessment Score:  Readmission Risk              Risk of Unplanned Readmission:  12           Discharging to Facility/ Agency   Name: [facility] Details  Fax          [address] [address]       Phone: [phone]       Fax: [phone]          / signature: Electronically signed by Wilfred Henley RN on 5/27/22 at 8:53 AM EDT    PHYSICIAN SECTION    Prognosis: Fair    Condition at Discharge: Stable    Rehab Potential (if transferring to Rehab): Fair    Recommended Labs or Other Treatments After Discharge:   Continue PT,OT  COVID pneumonia, treated with Remdesivir. Monitor breathing. Take Paxlovid for next 5 days  Take Doxycycline antibiotic for 5 days    Orthostatic hypotension, syncope- treated with fluids. Make sure hydrated, use compression stockings, get up slowly with support, use mobility device for support, fall precautions. Wear holter monitor for 48 hrs, f/u with Cardiology in 2 weeks  F/u with PCP in 1 week    Follow-up with Neurosurgery for cervvical myelopathy for possible decompression surgery  ABEL Plummer [address]  [address] [place] [address]/[facility] (Medical Office Building 2)  [address]  Call [phone] for an appointment. Please follow up in 4 weeks with Dr Agnes Sanchez to discuss surgery    Parkinson's diease and failed swallow study. Keep NGT for 1 week, repeat swallow study after 1 week and follow. Aspiration pneumonia: continue on augmentin for 5 days. Aspiration and fall precautions   Recommend follow up by palliative care in facility. Physician Certification: I certify the above information and transfer of Merlin Brittle  is necessary for the continuing treatment of the diagnosis listed and that he requires Edgardo Louel for greater 30 days.      Update Admission H&P: No change in H&P    PHYSICIAN SIGNATURE:  Electronically signed by Rona Pineda MD on 5/20/22 at 11:04 AM EDT

## 2022-05-20 NOTE — PROGRESS NOTES
[facility]  Internal Medicine Teaching Residency Program  Inpatient Daily Progress Note  ______________________________________________________________________________    Patient: Jennifer Ascencio  YOB: 1945   QIA:[id number]    Acct: [de-identified]     Room: [facility]1747-84  Admit date: 5/15/2022  Today's date: 05/20/22  Number of days in the hospital: 5    SUBJECTIVE   Admitting Diagnosis: Weakness  CC: Syncopal episodes  Pt examined at bedside. Chart & results reviewed. BP labile, febrile to 100.2 during last 24 hrs  Saturating on room air  AAO x3. Echo LVEF > 55%  Holter monitoring on discharge per cardio  F/u with NSG in 4 weeks    ROS:  Constitutional:  negative for chills, fevers, sweats  Respiratory:  Positive for shortness of breath,  Cardiovascular:  negative for chest pain, chest pressure/discomfort, lower extremity edema, palpitations  Gastrointestinal:  negative for abdominal pain, constipation, diarrhea, nausea, vomiting  Neurological:  Positive for dizziness, negative headache  BRIEF HISTORY     The patient is a pleasant 76 y. o. male with past medical history signifiacnt for parkinsonism disease, hypertension and hyperlipidemia presents through EMS with chief complaint of syncope that occurred just prior to arrival.  History was mostly provided by the caretaker the ER resident.      Patient is admitted for syncopal episode work-up.  Likely multifactorial due to dysautonomia, orthostatic hypotension, neurocardiogenic, neurogenic due to cervical spinal stenosis and canal narrowing.  Cardiology consulted for ruling out cardiogenic causes.  Plan for echo.     Dehydration secondary to decreased oral intake causing KALYAN, low blood pressure, orthostatic hypotension.   COVID-positive.  ID following  Neurology: Signed off  Echo LVEF > 55%  Holter monitoring on discharge per cardio  F/u with NSG in 4 weeks    OBJECTIVE     Vital Signs:  BP (!) 91/50   Pulse 61 Temp 98.3 °F (36.8 °C) (Oral)   Resp 20   Ht 5' 9.02\" (1.753 m)   Wt 122 lb (55.3 kg)   SpO2 97%   BMI 18.02 kg/m²     Temp (24hrs), Av.5 °F (36.9 °C), Min:97.8 °F (36.6 °C), Max:100.2 °F (37.9 °C)    In: 3200.1   Out: -     Physical Exam:  Constitutional: This is a well developed, well nourished, 17-18.4 - Mild malnutrition / Protein energy malnutrition Grade I 77y.o. year old male who is alert, oriented, cooperative and in no apparent distress. Head:normocephalic and atraumatic. EENT:  PERRLA. No conjunctival injections. Septum was midline, mucosa was without erythema, exudates or cobblestoning. No thrush was noted. Neck: Supple without thyromegaly. No elevated JVP. Trachea was midline. Respiratory: Chest was symmetrical without dullness to percussion. Breath sounds bilaterally were clear to auscultation. There were no wheezes, rhonchi or rales. There is no intercostal retraction or use of accessory muscles. No egophony noted. Cardiovascular: Regular without murmur, clicks, gallops or rubs. Abdomen: Slightly rounded and soft without organomegaly. No rebound, rigidity or guarding was appreciated. Musculoskeletal: Normal curvature of the spine. No gross muscle weakness. Extremities:  No lower extremity edema, ulcerations, tenderness, varicosities or erythema. Muscle size, tone and strength are normal.  No involuntary movements are noted. Skin:  Warm and dry. Good color, turgor and pigmentation. No lesions or scars.   No cyanosis or clubbing  Neurological/Psychiatric: The patient's general behavior, level of consciousness, thought content and emotional status is normal.        Medications:  Scheduled Medications:    vitamin D  50,000 Units Oral Weekly    iron sucrose  200 mg IntraVENous Q24H    sodium chloride  250 mL IntraVENous Once    sennosides-docusate sodium  2 tablet Oral Daily    remdesivir IVPB  100 mg IntraVENous Q24H    carbidopa-levodopa  1 tablet Oral TID    sodium chloride flush  5-40 mL IntraVENous 2 times per day     Continuous Infusions:    dextrose 100 mL/hr (05/19/22 0340)    sodium chloride 100 mL/hr at 05/19/22 2101    sodium chloride       PRN Medicationssodium chloride, 30 mL, PRN  glucose, 4 tablet, PRN  dextrose bolus, 125 mL, PRN   Or  dextrose bolus, 250 mL, PRN  glucagon (rDNA), 1 mg, PRN  dextrose, 100 mL/hr, PRN  sodium chloride flush, 10 mL, PRN  sodium chloride flush, 5-40 mL, PRN  sodium chloride, , PRN  ondansetron, 4 mg, Q8H PRN   Or  ondansetron, 4 mg, Q6H PRN  polyethylene glycol, 17 g, Daily PRN  acetaminophen, 650 mg, Q6H PRN   Or  acetaminophen, 650 mg, Q6H PRN        Diagnostic Labs:  CBC:   Recent Labs     05/18/22  0507 05/19/22  0729   WBC 4.4 4.8   RBC 3.05* 2.93*   HGB 9.4* 8.8*   HCT 27.9* 26.4*   MCV 91.5 90.1   RDW 13.9 13.9   PLT 87* 84*     BMP:   Recent Labs     05/18/22  0507   *   K 3.6*      CO2 21   BUN 28*   CREATININE 1.27*     BNP: No results for input(s): BNP in the last 72 hours. PT/INR: No results for input(s): PROTIME, INR in the last 72 hours. APTT: No results for input(s): APTT in the last 72 hours. CARDIAC ENZYMES: No results for input(s): CKMB, CKMBINDEX, TROPONINI in the last 72 hours. Invalid input(s): CKTOTAL;3  FASTING LIPID PANEL:No results found for: CHOL, HDL, TRIG  LIVER PROFILE:   Recent Labs     05/17/22  0906 05/18/22  0507   AST 41* 40*   ALT 6 8   BILIDIR 0.11 <0.08   BILITOT 0.24* 0.16*   ALKPHOS 37* 37*      MICROBIOLOGY:   Lab Results   Component Value Date/Time    CULTURE NO GROWTH 2 DAYS 05/17/2022 09:05 AM       Imaging:    XR SHOULDER RIGHT (MIN 2 VIEWS)    Result Date: 5/19/2022  Moderate glenohumeral and moderate to severe acromioclavicular joint degenerative change without fracture. High riding humeral head indicative of chronic high-grade rotator cuff pathology. CT Head WO Contrast    Result Date: 5/15/2022  Markedly limited exam due to motion artifact.   No gross acute intracranial abnormality. CT Cervical Spine WO Contrast    Result Date: 5/15/2022  No acute abnormality of the cervical spine. Advanced multilevel degenerative disc disease and facet arthropathy. CT Lumbar Spine WO Contrast    Result Date: 5/15/2022  Chest CT angiogram: No evidence of pulmonary embolism or acute pulmonary abnormality. Cholelithiasis. Moderate free fluid within peritoneal cavity may be suggestive of ascites. CT of thoracic spine: No acute osseous abnormality. No fracture. CT of lumbar spine: No acute osseous abnormality. No fracture. Mild levoscoliosis of thoracolumbar junction. Multiple round hypodense lesions are noted throughout the lumbar spine most pronounced within L3 vertebral body. Findings may be related to osteopenia and subchondral cystic change. Marrow infiltrative lesions cannot be completely excluded. RECOMMENDATIONS: Unavailable     MRI CERVICAL SPINE WO CONTRAST    Result Date: 5/17/2022  1. Moderate spinal canal stenosis and severe bilateral neural foraminal narrowing at C4-C5 secondary to a posterior disc osteophyte complex, uncovertebral overgrowth and facet arthropathy. There is mild increased T2 signal intensity within the spinal cord at this level suggesting spondylotic myelopathy. 2. Moderate spinal canal stenosis and severe bilateral neural foraminal narrowing at C3-C4, as described above. 3. Mild spinal canal stenosis from C5-C6 to C7-T1. 4. Severe bilateral neural foraminal narrowing at C6-C7 and C7-T1, as described above. MRI LUMBAR SPINE W WO CONTRAST    Result Date: 5/15/2022  The previously noted round hypodense lesions throughout the lumbar spine correspond to subchondral cystic change/degenerative findings. No evidence of marrow infiltrative lesion is noted. Partial visualization of a massive cystic structure within the right side of peritoneal cavity, measuring approximately 14 cm. The lesion does not appear to be arising from the right kidney.   If there is need for further evaluation, CT of the abdomen and pelvis can be obtained. At L5-S1, grade 1 anterolisthesis, bilateral pars defects and severe bilateral neural foraminal narrowing RECOMMENDATIONS: Unavailable     XR CHEST PORTABLE    Result Date: 5/17/2022  No acute cardiopulmonary findings. XR CHEST PORTABLE    Result Date: 5/15/2022  No acute cardiopulmonary disease. CT CHEST PULMONARY EMBOLISM W CONTRAST    Result Date: 5/15/2022  Chest CT angiogram: No evidence of pulmonary embolism or acute pulmonary abnormality. Cholelithiasis. Moderate free fluid within peritoneal cavity may be suggestive of ascites. CT of thoracic spine: No acute osseous abnormality. No fracture. CT of lumbar spine: No acute osseous abnormality. No fracture. Mild levoscoliosis of thoracolumbar junction. Multiple round hypodense lesions are noted throughout the lumbar spine most pronounced within L3 vertebral body. Findings may be related to osteopenia and subchondral cystic change. Marrow infiltrative lesions cannot be completely excluded. RECOMMENDATIONS: Unavailable     XR CERVICAL SPINE FLEXION AND EXTENSION    Result Date: 5/17/2022  Advanced multilevel spondylosis without any findings of dynamic instability. Overall limited range of motion. MRI BRAIN WO CONTRAST    Result Date: 5/17/2022  1. No acute infarct or acute intracranial process identified. 2. Diffuse cerebral volume loss and moderate chronic small vessel ischemic changes. FL MODIFIED BARIUM SWALLOW W VIDEO    Result Date: 5/17/2022  1. No aspiration. 2. Deep laryngeal penetration of nectar thick liquid and thin liquid. Please see separate speech pathology report for full discussion of findings and recommendations. CT THORACIC SPINE TRAUMA RECONSTRUCTION    Result Date: 5/15/2022  Chest CT angiogram: No evidence of pulmonary embolism or acute pulmonary abnormality. Cholelithiasis.   Moderate free fluid within peritoneal cavity may be suggestive Germán Stephenson, including pertinent history and exam findings,  with the resident. I have seen and examined the patient and the key elements of all parts of the encounter have been performed by me. I agree with the assessment, plan and orders as documented by the resident with additions . Treatment plan Discussed with nursing staff in detail , all questions answered . Electronically signed by Dick Betancourt MD on   5/20/22 at 12:00 PM EDT    Please note that this chart was generated using voice recognition Dragon dictation software. Although every effort was made to ensure the accuracy of this automated transcription, some errors in transcription may have occurred.

## 2022-05-20 NOTE — PLAN OF CARE
Problem: Discharge Planning  Goal: Discharge to home or other facility with appropriate resources  Outcome: Progressing     Problem: Skin/Tissue Integrity  Goal: Absence of new skin breakdown  Description: 1. Monitor for areas of redness and/or skin breakdown  2. Assess vascular access sites hourly  3. Every 4-6 hours minimum:  Change oxygen saturation probe site  4. Every 4-6 hours:  If on nasal continuous positive airway pressure, respiratory therapy assess nares and determine need for appliance change or resting period. Outcome: Progressing     Problem: Safety - Adult  Goal: Free from fall injury  Outcome: Progressing     Problem: Confusion  Goal: Confusion, delirium, dementia, or psychosis is improved or at baseline  Description: INTERVENTIONS:  1. Assess for possible contributors to thought disturbance, including medications, impaired vision or hearing, underlying metabolic abnormalities, dehydration, psychiatric diagnoses, and notify attending LIP  2. [place] high risk fall precautions, as indicated  3. Provide frequent short contacts to provide reality reorientation, refocusing and direction  4. Decrease environmental stimuli, including noise as appropriate  5. Monitor and intervene to maintain adequate nutrition, hydration, elimination, sleep and activity  6. If unable to ensure safety without constant attention obtain sitter and review sitter guidelines with assigned personnel  7.  Initiate Psychosocial CNS and Spiritual Care consult, as indicated  Outcome: Progressing     Problem: Pain  Goal: Verbalizes/displays adequate comfort level or baseline comfort level  Outcome: Progressing

## 2022-05-20 NOTE — PROGRESS NOTES
[facility] -   Infectious diseases evaluation  admission date 5/15/2022    reason for consultation:   covid     Impression :   Current:  · Syncope  · COVID, mild illness  · Parkinson  · KALYAN   · CRP elevation    Other:  ·   Discussion / summary of stay / plan of care   ·   Recommendations   · remdesevir 5 days 5/17 -5/21 -  · Keep full course  · FU LFT so far good  · Repeat CRP in am to decide if further covid tx is needed  · spirometer to use fr atelectasis   Disc w resident  Infection Control Recommendations   · Universal Precautions  · Droplet plus Isolation      Antimicrobial Stewardship Recommendations   · Simplification of therapy  · Targeted therapy    History of Present Illness:   Initial history:  Kate Cherry is a 77y.o.-year-old male who presented to the hospital 5/15 with a syncopal episode, while he was sitting in his wheelchair. No seizure activity  Was having some cough and  COVID-positive tested before he came in. CXR neg  CT chest neg  - CT AP L3 vertebral body multiple round lesions. pt does have some underlying and uses a wheelchair  Past etoh++    Pt is on RAand very poor historian    Interval changes  5/20/2022   Patient Vitals for the past 8 hrs:   BP Temp Temp src Pulse Resp SpO2   05/20/22 1122 (!) 153/115 98.7 °F (37.1 °C) Temporal 85 16 94 %   05/20/22 0845 (!) 169/63 -- -- 71 18 97 %   05/20/22 0830 (!) 154/128 97.8 °F (36.6 °C) Oral 80 22 97 %   05/20/22 0448 (!) 112/43 98.4 °F (36.9 °C) Oral 63 18 --   congested and wet cough - no fever  Repeat CRP 28 up and repeat CXR w R atelectasis  And elevated R adrianne diaphragm    Summary of relevant labs:  Labs:  CRP 12 - 10 - 28  Creat 1.27  WBC 4.4 - 4.8    Micro:  BC 5/17  U cx 5/17    Imaging:  CXR 5/20  Elevation of the right hemidiaphragm with right basilar airspace opacities. Stable cardiomediastinal silhouette. Left lung is clear. No pneumothorax.  No definite pleural effusion       CT chest and AP  neg for pneumonia   Upper Abdomen: Cholelithiasis and free fluid within peritoneal cavity which may be secondary to ascites. I have personally reviewed the past medical history, past surgical history, medications, social history, and family history, and I haveupdated the database accordingly. Allergies:   Patient has no known allergies. Review of Systems:     Review of Systems   Constitutional: Positive for activity change. HENT: Positive for congestion. Eyes: Negative for discharge. Respiratory: Positive for cough. Negative for apnea. Cardiovascular: Negative for chest pain. Gastrointestinal: Negative for abdominal distention. Endocrine: Negative for heat intolerance. Genitourinary: Negative for dysuria. Musculoskeletal: Negative for arthralgias. Skin: Negative for color change. Allergic/Immunologic: Negative for immunocompromised state. Neurological: Negative for dizziness, seizures, light-headedness, numbness and headaches. Hematological: Negative for adenopathy. Psychiatric/Behavioral: Negative for agitation. Physical Examination :       Physical Exam  Constitutional:       Appearance: Normal appearance. He is not ill-appearing or diaphoretic. HENT:      Head: Normocephalic and atraumatic. Nose: Nose normal.      Mouth/Throat:      Mouth: Mucous membranes are moist.   Eyes:      General: No scleral icterus. Conjunctiva/sclera: Conjunctivae normal.      Pupils: Pupils are equal, round, and reactive to light. Cardiovascular:      Rate and Rhythm: Normal rate and regular rhythm. Heart sounds: Normal heart sounds. No murmur heard. Pulmonary:      Effort: No respiratory distress. Breath sounds: Normal breath sounds. No stridor. Abdominal:      General: There is no distension. Palpations: Abdomen is soft. There is no mass.    Genitourinary:     Comments: No broussard  Musculoskeletal:         General: No swelling, tenderness, deformity or signs of injury. Cervical back: Neck supple. No rigidity or tenderness. Skin:     General: Skin is dry. Coloration: Skin is not jaundiced. Neurological:      General: No focal deficit present. Mental Status: He is alert and oriented to person, place, and time. Psychiatric:         Mood and Affect: Mood normal.         Thought Content: Thought content normal.         Past Medical History:   No past medical history on file. Past Surgical  History:   No past surgical history on file. Medications:      enoxaparin  40 mg SubCUTAneous Daily    vitamin D  50,000 Units Oral Weekly    sodium chloride  250 mL IntraVENous Once    sennosides-docusate sodium  2 tablet Oral Daily    remdesivir IVPB  100 mg IntraVENous Q24H    carbidopa-levodopa  1 tablet Oral TID    sodium chloride flush  5-40 mL IntraVENous 2 times per day       Social History:     Social History     Socioeconomic History    Marital status: Single     Spouse name: Not on file    Number of children: Not on file    Years of education: Not on file    Highest education level: Not on file   Occupational History    Not on file   Tobacco Use    Smoking status: Not on file    Smokeless tobacco: Not on file   Substance and Sexual Activity    Alcohol use: Not on file    Drug use: Not on file    Sexual activity: Not on file   Other Topics Concern    Not on file   Social History Narrative    Not on file     Social Determinants of Health     Financial Resource Strain:     Difficulty of Paying Living Expenses: Not on file   Food Insecurity:     Worried About Running Out of Food in the Last Year: Not on file    Ashley of Food in the Last Year: Not on file   Transportation Needs:     Lack of Transportation (Medical): Not on file    Lack of Transportation (Non-Medical):  Not on file   Physical Activity:     Days of Exercise per Week: Not on file    Minutes of Exercise per Session: Not on file   Stress:  Feeling of Stress : Not on file   Social Connections:     Frequency of Communication with Friends and Family: Not on file    Frequency of Social Gatherings with Friends and Family: Not on file    Attends Orthodoxy Services: Not on file    Active Member of Clubs or Organizations: Not on file    Attends Club or Organization Meetings: Not on file    Marital Status: Not on file   Intimate Partner Violence:     Fear of Current or Ex-Partner: Not on file    Emotionally Abused: Not on file    Physically Abused: Not on file    Sexually Abused: Not on file   Housing Stability:     Unable to Pay for Housing in the Last Year: Not on file    Number of [place] in the Last Year: Not on file    Unstable Housing in the Last Year: Not on file       Family History:   No family history on file. Medical Decision Making:   I have independently reviewed/ordered the following labs:    CBC with Differential:   Recent Labs     05/19/22  0729 05/20/22  0819   WBC 4.8 5.1   HGB 8.8* 9.8*   HCT 26.4* 30.0*   PLT 84* See Reflexed IPF Result   LYMPHOPCT 17* 18*   MONOPCT 7 8     BMP:  Recent Labs     05/18/22  0507 05/18/22  1306 05/20/22  0819   *  --  135   K 3.6*  --  3.4*     --  109*   CO2 21  --  22   BUN 28*  --  20   CREATININE 1.27*  --  0.92   MG  --  1.7 2.1     Hepatic Function Panel:   Recent Labs     05/18/22  0507   PROT 4.4*   LABALBU 2.4*   BILIDIR <0.08   IBILI Can not be calculated   BILITOT 0.16*   ALKPHOS 37*   ALT 8   AST 40*     No results for input(s): RPR in the last 72 hours. No results for input(s): HIV in the last 72 hours. No results for input(s): BC in the last 72 hours. Lab Results   Component Value Date    CREATININE 0.92 05/20/2022    GLUCOSE 80 05/20/2022       Detailed results: Thank you for allowing us to participate in the care of this patient. Please call with questions.     This note is created with the assistance of a speech recognition program.  While intending to generate adocument that actually reflects the content of the visit, the document can still have some errors including those of syntax and sound a like substitutions which may escape proof reading. It such instances, actual meaningcan be extrapolated by contextual diversion.     Tomas Zaidi MD  Office: [phone]  Perfect serve / office [phone]

## 2022-05-20 NOTE — CARE COORDINATION
Received a call from Rl Evans at [facility], she informs me that they have     [facility], cannot accept covid positive patient  5040 Received a call from Rl Evans at [facility]. Patient has been accepted to facility once his IV remdesivir is complete. If patient is discharged on the weekend call Rl Evans at [phone].   Report to [phone]

## 2022-05-21 ENCOUNTER — APPOINTMENT (OUTPATIENT)
Dept: GENERAL RADIOLOGY | Age: 77
DRG: 056 | End: 2022-05-21
Payer: OTHER GOVERNMENT

## 2022-05-21 LAB
ABSOLUTE EOS #: 0.15 K/UL (ref 0–0.44)
ABSOLUTE IMMATURE GRANULOCYTE: <0.03 K/UL (ref 0–0.3)
ABSOLUTE LYMPH #: 0.82 K/UL (ref 1.1–3.7)
ABSOLUTE MONO #: 0.37 K/UL (ref 0.1–1.2)
ANION GAP SERPL CALCULATED.3IONS-SCNC: 10 MMOL/L (ref 9–17)
BASOPHILS # BLD: 0 % (ref 0–2)
BASOPHILS ABSOLUTE: <0.03 K/UL (ref 0–0.2)
BUN BLDV-MCNC: 18 MG/DL (ref 8–23)
C-REACTIVE PROTEIN: 31.2 MG/L (ref 0–5)
CALCIUM SERPL-MCNC: 6.7 MG/DL (ref 8.6–10.4)
CHLORIDE BLD-SCNC: 109 MMOL/L (ref 98–107)
CO2: 19 MMOL/L (ref 20–31)
CREAT SERPL-MCNC: 0.88 MG/DL (ref 0.7–1.2)
EOSINOPHILS RELATIVE PERCENT: 3 % (ref 1–4)
GFR AFRICAN AMERICAN: >60 ML/MIN
GFR NON-AFRICAN AMERICAN: >60 ML/MIN
GFR SERPL CREATININE-BSD FRML MDRD: ABNORMAL ML/MIN/{1.73_M2}
GLUCOSE BLD-MCNC: 62 MG/DL (ref 75–110)
GLUCOSE BLD-MCNC: 65 MG/DL (ref 75–110)
GLUCOSE BLD-MCNC: 76 MG/DL (ref 75–110)
GLUCOSE BLD-MCNC: 76 MG/DL (ref 75–110)
GLUCOSE BLD-MCNC: 81 MG/DL (ref 75–110)
GLUCOSE BLD-MCNC: 83 MG/DL (ref 70–99)
GLUCOSE BLD-MCNC: 95 MG/DL (ref 75–110)
HCT VFR BLD CALC: 27.3 % (ref 40.7–50.3)
HEMOGLOBIN: 9.2 G/DL (ref 13–17)
IMMATURE GRANULOCYTES: 0 %
LYMPHOCYTES # BLD: 18 % (ref 24–43)
MAGNESIUM: 1.8 MG/DL (ref 1.6–2.6)
MCH RBC QN AUTO: 30.2 PG (ref 25.2–33.5)
MCHC RBC AUTO-ENTMCNC: 33.7 G/DL (ref 28.4–34.8)
MCV RBC AUTO: 89.5 FL (ref 82.6–102.9)
MONOCYTES # BLD: 8 % (ref 3–12)
NRBC AUTOMATED: 0 PER 100 WBC
PDW BLD-RTO: 14.1 % (ref 11.8–14.4)
PLATELET # BLD: ABNORMAL K/UL (ref 138–453)
PLATELET, FLUORESCENCE: 90 K/UL (ref 138–453)
PLATELET, IMMATURE FRACTION: 7.5 % (ref 1.1–10.3)
POTASSIUM SERPL-SCNC: 3.5 MMOL/L (ref 3.7–5.3)
RBC # BLD: 3.05 M/UL (ref 4.21–5.77)
SEG NEUTROPHILS: 70 % (ref 36–65)
SEGMENTED NEUTROPHILS ABSOLUTE COUNT: 3.22 K/UL (ref 1.5–8.1)
SODIUM BLD-SCNC: 138 MMOL/L (ref 135–144)
WBC # BLD: 4.6 K/UL (ref 3.5–11.3)

## 2022-05-21 PROCEDURE — 2500000003 HC RX 250 WO HCPCS: Performed by: INTERNAL MEDICINE

## 2022-05-21 PROCEDURE — 99232 SBSQ HOSP IP/OBS MODERATE 35: CPT | Performed by: INTERNAL MEDICINE

## 2022-05-21 PROCEDURE — 6370000000 HC RX 637 (ALT 250 FOR IP)

## 2022-05-21 PROCEDURE — 93242 EXT ECG>48HR<7D RECORDING: CPT

## 2022-05-21 PROCEDURE — 6370000000 HC RX 637 (ALT 250 FOR IP): Performed by: STUDENT IN AN ORGANIZED HEALTH CARE EDUCATION/TRAINING PROGRAM

## 2022-05-21 PROCEDURE — 36415 COLL VENOUS BLD VENIPUNCTURE: CPT

## 2022-05-21 PROCEDURE — 85055 RETICULATED PLATELET ASSAY: CPT

## 2022-05-21 PROCEDURE — 71045 X-RAY EXAM CHEST 1 VIEW: CPT

## 2022-05-21 PROCEDURE — 82947 ASSAY GLUCOSE BLOOD QUANT: CPT

## 2022-05-21 PROCEDURE — 93243 EXT ECG>48HR<7D SCAN A/R: CPT

## 2022-05-21 PROCEDURE — 80048 BASIC METABOLIC PNL TOTAL CA: CPT

## 2022-05-21 PROCEDURE — 6360000002 HC RX W HCPCS: Performed by: STUDENT IN AN ORGANIZED HEALTH CARE EDUCATION/TRAINING PROGRAM

## 2022-05-21 PROCEDURE — 2580000003 HC RX 258

## 2022-05-21 PROCEDURE — 85025 COMPLETE CBC W/AUTO DIFF WBC: CPT

## 2022-05-21 PROCEDURE — 2580000003 HC RX 258: Performed by: INTERNAL MEDICINE

## 2022-05-21 PROCEDURE — 2060000000 HC ICU INTERMEDIATE R&B

## 2022-05-21 PROCEDURE — 86140 C-REACTIVE PROTEIN: CPT

## 2022-05-21 PROCEDURE — 83735 ASSAY OF MAGNESIUM: CPT

## 2022-05-21 RX ORDER — DOXYCYCLINE HYCLATE 100 MG
100 TABLET ORAL 2 TIMES DAILY
Qty: 28 TABLET | Refills: 0 | Status: SHIPPED | OUTPATIENT
Start: 2022-05-21 | End: 2022-05-23 | Stop reason: HOSPADM

## 2022-05-21 RX ORDER — DOXYCYCLINE HYCLATE 100 MG
100 TABLET ORAL EVERY 12 HOURS SCHEDULED
Status: DISCONTINUED | OUTPATIENT
Start: 2022-05-22 | End: 2022-05-23

## 2022-05-21 RX ORDER — LANOLIN ALCOHOL/MO/W.PET/CERES
325 CREAM (GRAM) TOPICAL
Status: DISCONTINUED | OUTPATIENT
Start: 2022-05-21 | End: 2022-05-27 | Stop reason: HOSPADM

## 2022-05-21 RX ADMIN — FERROUS SULFATE TAB EC 325 MG (65 MG FE EQUIVALENT) 325 MG: 325 (65 FE) TABLET DELAYED RESPONSE at 12:18

## 2022-05-21 RX ADMIN — POTASSIUM BICARBONATE 40 MEQ: 782 TABLET, EFFERVESCENT ORAL at 09:01

## 2022-05-21 RX ADMIN — CARBIDOPA AND LEVODOPA 1 TABLET: 25; 100 TABLET ORAL at 21:00

## 2022-05-21 RX ADMIN — CARBIDOPA AND LEVODOPA 1 TABLET: 25; 100 TABLET ORAL at 14:47

## 2022-05-21 RX ADMIN — CARBIDOPA AND LEVODOPA 1 TABLET: 25; 100 TABLET ORAL at 09:01

## 2022-05-21 RX ADMIN — DOCUSATE SODIUM 50 MG AND SENNOSIDES 8.6 MG 2 TABLET: 8.6; 5 TABLET, FILM COATED ORAL at 09:01

## 2022-05-21 RX ADMIN — SODIUM CHLORIDE: 9 INJECTION, SOLUTION INTRAVENOUS at 21:46

## 2022-05-21 RX ADMIN — SODIUM CHLORIDE, PRESERVATIVE FREE 10 ML: 5 INJECTION INTRAVENOUS at 21:00

## 2022-05-21 RX ADMIN — ENOXAPARIN SODIUM 40 MG: 100 INJECTION SUBCUTANEOUS at 09:01

## 2022-05-21 RX ADMIN — SODIUM CHLORIDE: 9 INJECTION, SOLUTION INTRAVENOUS at 13:10

## 2022-05-21 RX ADMIN — REMDESIVIR 100 MG: 100 INJECTION, POWDER, LYOPHILIZED, FOR SOLUTION INTRAVENOUS at 16:48

## 2022-05-21 ASSESSMENT — ENCOUNTER SYMPTOMS
COUGH: 1
EYE DISCHARGE: 0
APNEA: 0
ABDOMINAL DISTENTION: 0
COLOR CHANGE: 0

## 2022-05-21 NOTE — PROGRESS NOTES
[facility] -   Infectious diseases evaluation  admission date 5/15/2022    reason for consultation:   covid     Impression :   Current:  · Syncope  · COVID, mild illness  · Parkinson  · KALYAN   · CRP elevation    Other:  ·   Discussion / summary of stay / plan of care   ·   Recommendations   · remdesevir 5 days 5/17 -5/21 -  · Pull left iv  - site red - start doxy x 5 days  · CRP on the rise and CXr stable - still coughing and not yet feeling betetr  · DC on 5 days of paxlovid for the persistent covid  and 5 days doxy po for the iv site  · Pt is not toxic and seems ok - just too old and at risk for progression hence more aggressive tx for covid  · spirometer to use fr atelectasis   Disc w resident  Infection Control Recommendations   · Universal Precautions  · Droplet plus Isolation      Antimicrobial Stewardship Recommendations   · Simplification of therapy  · Targeted therapy    History of Present Illness:   Initial history:  Ace Varghese is a 77y.o.-year-old male who presented to the hospital 5/15 with a syncopal episode, while he was sitting in his wheelchair. No seizure activity  Was having some cough and  COVID-positive tested before he came in. CXR neg  CT chest neg  - CT AP L3 vertebral body multiple round lesions.     pt does have some underlying and uses a wheelchair  Past etoh++    Pt is on RAand very poor historian    Interval changes  5/21/2022   Patient Vitals for the past 8 hrs:   BP Temp Temp src Pulse Resp SpO2   05/21/22 1220 (!) 173/57 99.9 °F (37.7 °C) Oral 70 18 98 %   05/21/22 0905 (!) 173/78 -- -- 72 13 97 %   05/21/22 0900 -- 99.7 °F (37.6 °C) Oral -- -- --   congested and wet cough - no fever  repeat CXR w R atelectasis  And elevated R adrianne diaphragm    CRP up 31 and iv site is red  cough w some bronchospasm  Seems still congested    On remdesevir        Summary of relevant labs:  Labs:  CRP 12 - 10 - 28 - 31  Creat 1.27  WBC 4.4 - 4.8    Micro:  BC 5/17  U cx 5/17    Imaging:  CXR 5/20  Elevation of the right hemidiaphragm with right basilar airspace opacities. Stable cardiomediastinal silhouette. Left lung is clear. No pneumothorax. No definite pleural effusion       CT chest and AP  neg for pneumonia   Upper Abdomen: Cholelithiasis and free fluid within peritoneal cavity which may be secondary to ascites. I have personally reviewed the past medical history, past surgical history, medications, social history, and family history, and I haveupdated the database accordingly. Allergies:   Patient has no known allergies. Review of Systems:     Review of Systems   Constitutional: Positive for activity change. HENT: Positive for congestion. Eyes: Negative for discharge. Respiratory: Positive for cough. Negative for apnea. Cardiovascular: Negative for chest pain. Gastrointestinal: Negative for abdominal distention. Endocrine: Negative for heat intolerance. Genitourinary: Negative for dysuria. Musculoskeletal: Negative for arthralgias. Skin: Negative for color change. Allergic/Immunologic: Negative for immunocompromised state. Neurological: Negative for dizziness, tremors, seizures, syncope, light-headedness, numbness and headaches. Hematological: Negative for adenopathy. Psychiatric/Behavioral: Negative for agitation. Physical Examination :       Physical Exam  Constitutional:       General: He is not in acute distress. Appearance: Normal appearance. He is not ill-appearing, toxic-appearing or diaphoretic. HENT:      Head: Normocephalic and atraumatic. Nose: Nose normal.      Mouth/Throat:      Mouth: Mucous membranes are moist.   Eyes:      General: No scleral icterus. Conjunctiva/sclera: Conjunctivae normal.      Pupils: Pupils are equal, round, and reactive to light. Cardiovascular:      Rate and Rhythm: Normal rate and regular rhythm. Heart sounds: Normal heart sounds.  No murmur heard. Pulmonary:      Effort: No respiratory distress. Breath sounds: Normal breath sounds. No stridor. Abdominal:      General: There is no distension. Palpations: Abdomen is soft. There is no mass. Genitourinary:     Comments: No broussard  Musculoskeletal:         General: No swelling, tenderness, deformity or signs of injury. Cervical back: Neck supple. No rigidity or tenderness. Skin:     General: Skin is dry. Coloration: Skin is not jaundiced. Neurological:      General: No focal deficit present. Mental Status: He is alert and oriented to person, place, and time. Psychiatric:         Mood and Affect: Mood normal.         Thought Content: Thought content normal.         Past Medical History:   No past medical history on file. Past Surgical  History:   No past surgical history on file.     Medications:      ferrous sulfate  325 mg Oral Daily with breakfast    enoxaparin  40 mg SubCUTAneous Daily    vitamin D  50,000 Units Oral Weekly    sodium chloride  250 mL IntraVENous Once    sennosides-docusate sodium  2 tablet Oral Daily    remdesivir IVPB  100 mg IntraVENous Q24H    carbidopa-levodopa  1 tablet Oral TID    sodium chloride flush  5-40 mL IntraVENous 2 times per day       Social History:     Social History     Socioeconomic History    Marital status: Single     Spouse name: Not on file    Number of children: Not on file    Years of education: Not on file    Highest education level: Not on file   Occupational History    Not on file   Tobacco Use    Smoking status: Not on file    Smokeless tobacco: Not on file   Substance and Sexual Activity    Alcohol use: Not on file    Drug use: Not on file    Sexual activity: Not on file   Other Topics Concern    Not on file   Social History Narrative    Not on file     Social Determinants of Health     Financial Resource Strain:     Difficulty of Paying Living Expenses: Not on file   Food Insecurity:     Worried About [facility] in the Last Year: Not on file    Ashley of Food in the Last Year: Not on file   Transportation Needs:     Lack of Transportation (Medical): Not on file    Lack of Transportation (Non-Medical): Not on file   Physical Activity:     Days of Exercise per Week: Not on file    Minutes of Exercise per Session: Not on file   Stress:     Feeling of Stress : Not on file   Social Connections:     Frequency of Communication with Friends and Family: Not on file    Frequency of Social Gatherings with Friends and Family: Not on file    Attends Gnosticist Services: Not on file    Active Member of [address] [place] [address] [employer] or Organizations: Not on file    Attends Club or Organization Meetings: Not on file    Marital Status: Not on file   Intimate Partner Violence:     Fear of Current or Ex-Partner: Not on file    Emotionally Abused: Not on file    Physically Abused: Not on file    Sexually Abused: Not on file   Housing Stability:     Unable to Pay for Housing in the Last Year: Not on file    Number of [place] in the Last Year: Not on file    Unstable Housing in the Last Year: Not on file       Family History:   No family history on file. Medical Decision Making:   I have independently reviewed/ordered the following labs:    CBC with Differential:   Recent Labs     05/20/22  1234 05/21/22  0556   WBC 5.1 4.6   HGB 9.5* 9.2*   HCT 28.2* 27.3*   PLT 92* See Reflexed IPF Result   LYMPHOPCT 5* 18*   MONOPCT 4 8     BMP:  Recent Labs     05/20/22  0819 05/21/22  0556    138   K 3.4* 3.5*   * 109*   CO2 22 19*   BUN 20 18   CREATININE 0.92 0.88   MG 2.1 1.8     Hepatic Function Panel:   Recent Labs     05/20/22  0819   PROT 4.6*   LABALBU 2.4*   BILIDIR 0.10   IBILI 0.13   BILITOT 0.23*   ALKPHOS 41   ALT 6   AST 28     No results for input(s): RPR in the last 72 hours. No results for input(s): HIV in the last 72 hours. No results for input(s): BC in the last 72 hours.   Lab Results Component Value Date    CREATININE 0.88 05/21/2022    GLUCOSE 83 05/21/2022       Detailed results: Thank you for allowing us to participate in the care of this patient. Please call with questions. This note is created with the assistance of a speech recognition program.  While intending to generate adocument that actually reflects the content of the visit, the document can still have some errors including those of syntax and sound a like substitutions which may escape proof reading. It such instances, actual meaningcan be extrapolated by contextual diversion.     Allyne Babinski, MD  Office: [phone]  Perfect serve / office [phone]

## 2022-05-21 NOTE — PROGRESS NOTES
[facility]  Internal Medicine Teaching Residency Program  Inpatient Daily Progress Note  ______________________________________________________________________________    Patient: Rachael Wren  YOB: 1945   GDK:[id number]    Acct: [de-identified]     Room: [address] [place] [address]  Admit date: 5/15/2022  Today's date: 05/21/22  Number of days in the hospital: 6    SUBJECTIVE   Admitting Diagnosis: Weakness  CC: Syncopal episode  Pt examined at bedside. Chart & results reviewed. Hemodynamically stable, afebrile  No acute events overnight  Potassium 3.5, replaced  Calcium 6.7, corrected 8.0  Last dose of remdesivir today, plan for discharge    ROS:  Constitutional:  negative for chills, fevers, sweats  Respiratory:  negative for cough, dyspnea on exertion, hemoptysis, shortness of breath, wheezing  Cardiovascular:  negative for chest pain, chest pressure/discomfort, lower extremity edema, palpitations  Gastrointestinal:  negative for abdominal pain, constipation, diarrhea, nausea, vomiting  Neurological:  negative for dizziness, headache  BRIEF HISTORY     The patient is a pleasant 76 y. o. male with past medical history signifiacnt for parkinsonism disease, hypertension and hyperlipidemia presents through EMS with chief complaint of syncope that occurred just prior to arrival.  History was mostly provided by the caretaker the ER resident.      Patient is admitted for syncopal episode work-up.  Likely multifactorial due to dysautonomia, orthostatic hypotension, neurocardiogenic, neurogenic due to cervical spinal stenosis and canal narrowing.  Cardiology consulted for ruling out cardiogenic causes.  Plan for echo.     Dehydration secondary to decreased oral intake causing KALYAN, low blood pressure, orthostatic hypotension.   COVID-positive.  ID following  Neurology: Signed off  Echo LVEF > 55%  Holter monitoring on discharge per cardio  F/u with NSG in 4 weeks    OBJECTIVE Vital Signs:  BP (!) 134/49   Pulse 68   Temp 98.9 °F (37.2 °C) (Oral)   Resp 18   Ht 5' 9.02\" (1.753 m)   Wt 122 lb (55.3 kg)   SpO2 96%   BMI 18.02 kg/m²     Temp (24hrs), Av.5 °F (36.9 °C), Min:97.8 °F (36.6 °C), Max:99 °F (37.2 °C)    No intake/output data recorded. Physical Exam:  Constitutional: This is a well developed, well nourished, 17-18.4 - Mild malnutrition / Protein energy malnutrition Grade I 77y.o. year old male who is alert, oriented, cooperative and in no apparent distress. Head:normocephalic and atraumatic. Mask facies and bilateral upper extremity tremors  EENT:  PERRLA. No conjunctival injections. Septum was midline, mucosa was without erythema, exudates or cobblestoning. No thrush was noted. Neck: Supple without thyromegaly. No elevated JVP. Trachea was midline. Respiratory: Chest was symmetrical without dullness to percussion. Breath sounds bilaterally were clear to auscultation. There were no wheezes, rhonchi or rales. There is no intercostal retraction or use of accessory muscles. No egophony noted. Cardiovascular: Regular without murmur, clicks, gallops or rubs. Abdomen: Slightly rounded and soft without organomegaly. No rebound, rigidity or guarding was appreciated. Lymphatic: No lymphadenopathy. Musculoskeletal: Normal curvature of the spine. No gross muscle weakness. Extremities:  No lower extremity edema, ulcerations, tenderness, varicosities or erythema. Muscle size, tone and strength are normal.  No involuntary movements are noted. Skin:  Warm and dry. Good color, turgor and pigmentation. No lesions or scars.   No cyanosis or clubbing  Neurological/Psychiatric: The patient's general behavior, level of consciousness, thought content and emotional status is normal.        Medications:  Scheduled Medications:    enoxaparin  40 mg SubCUTAneous Daily    vitamin D  50,000 Units Oral Weekly    sodium chloride  250 mL IntraVENous Once    sennosides-docusate sodium  2 tablet Oral Daily    remdesivir IVPB  100 mg IntraVENous Q24H    carbidopa-levodopa  1 tablet Oral TID    sodium chloride flush  5-40 mL IntraVENous 2 times per day     Continuous Infusions:    dextrose 100 mL/hr (05/19/22 0340)    sodium chloride 100 mL/hr at 05/20/22 2058    sodium chloride       PRN Medicationssodium chloride, 30 mL, PRN  glucose, 4 tablet, PRN  dextrose bolus, 125 mL, PRN   Or  dextrose bolus, 250 mL, PRN  glucagon (rDNA), 1 mg, PRN  dextrose, 100 mL/hr, PRN  sodium chloride flush, 10 mL, PRN  sodium chloride flush, 5-40 mL, PRN  sodium chloride, , PRN  ondansetron, 4 mg, Q8H PRN   Or  ondansetron, 4 mg, Q6H PRN  polyethylene glycol, 17 g, Daily PRN  acetaminophen, 650 mg, Q6H PRN   Or  acetaminophen, 650 mg, Q6H PRN        Diagnostic Labs:  CBC:   Recent Labs     05/20/22  0819 05/20/22  1234 05/21/22  0556   WBC 5.1 5.1 4.6   RBC 3.31* 3.11* 3.05*   HGB 9.8* 9.5* 9.2*   HCT 30.0* 28.2* 27.3*   MCV 90.6 90.7 89.5   RDW 14.1 14.0 14.1   PLT See Reflexed IPF Result 92* See Reflexed IPF Result     BMP:   Recent Labs     05/20/22  0819      K 3.4*   *   CO2 22   BUN 20   CREATININE 0.92     BNP: No results for input(s): BNP in the last 72 hours. PT/INR: No results for input(s): PROTIME, INR in the last 72 hours. APTT: No results for input(s): APTT in the last 72 hours. CARDIAC ENZYMES: No results for input(s): CKMB, CKMBINDEX, TROPONINI in the last 72 hours. Invalid input(s): CKTOTAL;3  FASTING LIPID PANEL:No results found for: CHOL, HDL, TRIG  LIVER PROFILE:   Recent Labs     05/20/22  0819   AST 28   ALT 6   BILIDIR 0.10   BILITOT 0.23*   ALKPHOS 41      MICROBIOLOGY:   Lab Results   Component Value Date/Time    CULTURE NO GROWTH 3 DAYS 05/17/2022 09:05 AM       Imaging:    XR SHOULDER RIGHT (MIN 2 VIEWS)    Result Date: 5/19/2022  Moderate glenohumeral and moderate to severe acromioclavicular joint degenerative change without fracture.  High riding humeral head indicative of chronic high-grade rotator cuff pathology. CT Head WO Contrast    Result Date: 5/15/2022  Markedly limited exam due to motion artifact. No gross acute intracranial abnormality. CT Cervical Spine WO Contrast    Result Date: 5/15/2022  No acute abnormality of the cervical spine. Advanced multilevel degenerative disc disease and facet arthropathy. CT Lumbar Spine WO Contrast    Result Date: 5/15/2022  Chest CT angiogram: No evidence of pulmonary embolism or acute pulmonary abnormality. Cholelithiasis. Moderate free fluid within peritoneal cavity may be suggestive of ascites. CT of thoracic spine: No acute osseous abnormality. No fracture. CT of lumbar spine: No acute osseous abnormality. No fracture. Mild levoscoliosis of thoracolumbar junction. Multiple round hypodense lesions are noted throughout the lumbar spine most pronounced within L3 vertebral body. Findings may be related to osteopenia and subchondral cystic change. Marrow infiltrative lesions cannot be completely excluded. RECOMMENDATIONS: Unavailable     MRI CERVICAL SPINE WO CONTRAST    Result Date: 5/17/2022  1. Moderate spinal canal stenosis and severe bilateral neural foraminal narrowing at C4-C5 secondary to a posterior disc osteophyte complex, uncovertebral overgrowth and facet arthropathy. There is mild increased T2 signal intensity within the spinal cord at this level suggesting spondylotic myelopathy. 2. Moderate spinal canal stenosis and severe bilateral neural foraminal narrowing at C3-C4, as described above. 3. Mild spinal canal stenosis from C5-C6 to C7-T1. 4. Severe bilateral neural foraminal narrowing at C6-C7 and C7-T1, as described above. MRI LUMBAR SPINE W WO CONTRAST    Result Date: 5/15/2022  The previously noted round hypodense lesions throughout the lumbar spine correspond to subchondral cystic change/degenerative findings. No evidence of marrow infiltrative lesion is noted. Partial visualization of a massive cystic structure within the right side of peritoneal cavity, measuring approximately 14 cm. The lesion does not appear to be arising from the right kidney. If there is need for further evaluation, CT of the abdomen and pelvis can be obtained. At L5-S1, grade 1 anterolisthesis, bilateral pars defects and severe bilateral neural foraminal narrowing RECOMMENDATIONS: Unavailable     XR CHEST PORTABLE    Result Date: 5/20/2022  Elevation of the right hemidiaphragm with right basilar airspace opacities. These opacities could represent atelectasis or in the appropriate clinical setting pneumonia. XR CHEST PORTABLE    Result Date: 5/17/2022  No acute cardiopulmonary findings. XR CHEST PORTABLE    Result Date: 5/15/2022  No acute cardiopulmonary disease. CT CHEST PULMONARY EMBOLISM W CONTRAST    Result Date: 5/15/2022  Chest CT angiogram: No evidence of pulmonary embolism or acute pulmonary abnormality. Cholelithiasis. Moderate free fluid within peritoneal cavity may be suggestive of ascites. CT of thoracic spine: No acute osseous abnormality. No fracture. CT of lumbar spine: No acute osseous abnormality. No fracture. Mild levoscoliosis of thoracolumbar junction. Multiple round hypodense lesions are noted throughout the lumbar spine most pronounced within L3 vertebral body. Findings may be related to osteopenia and subchondral cystic change. Marrow infiltrative lesions cannot be completely excluded. RECOMMENDATIONS: Unavailable     XR CERVICAL SPINE FLEXION AND EXTENSION    Result Date: 5/17/2022  Advanced multilevel spondylosis without any findings of dynamic instability. Overall limited range of motion. MRI BRAIN WO CONTRAST    Result Date: 5/17/2022  1. No acute infarct or acute intracranial process identified. 2. Diffuse cerebral volume loss and moderate chronic small vessel ischemic changes. FL MODIFIED BARIUM SWALLOW W VIDEO    Result Date: 5/17/2022  1.  No aspiration. 2. Deep laryngeal penetration of nectar thick liquid and thin liquid. Please see separate speech pathology report for full discussion of findings and recommendations. CT THORACIC SPINE TRAUMA RECONSTRUCTION    Result Date: 5/15/2022  Chest CT angiogram: No evidence of pulmonary embolism or acute pulmonary abnormality. Cholelithiasis. Moderate free fluid within peritoneal cavity may be suggestive of ascites. CT of thoracic spine: No acute osseous abnormality. No fracture. CT of lumbar spine: No acute osseous abnormality. No fracture. Mild levoscoliosis of thoracolumbar junction. Multiple round hypodense lesions are noted throughout the lumbar spine most pronounced within L3 vertebral body. Findings may be related to osteopenia and subchondral cystic change. Marrow infiltrative lesions cannot be completely excluded. RECOMMENDATIONS: Unavailable       ASSESSMENT & PLAN     Principal Problem:    Weakness  Active Problems:    Parkinson's disease ([facility] [address]    COVID-19    Cervical myelopathy (HCC)    Iron deficiency anemia    Thrombocytopenia (HCC)    CRP elevated  Resolved Problems:    Syncope and collapse    Hypotension    KALYAN (acute kidney injury) (HCC)    Orthostatic hypertension    1. Syncope and collapse.  Multifactorial.  Likely 2/2 orthostatic hypotension 2/2 dysautonomia due to Parkinson's disease/dehydration vs cervical myelopathy vs cardiogenic vs COVID infection.  MRI cervical spine shows cervical canal narrowing. stop IVF. Echo LVEF >55 %. Holter monitoring as OP.follow up with cardio and NSG as OP  2. Parkinson's disease.  Continue on Sinemet 1 tablet 3 times daily. 3. COVID-19 infection, saturating on room air.  Airborne precautions. S/p  Remdesivir for 5 days. Follow on ID recomnds. 4. KALYAN.  Resolved  5. Orthostatic hypotension on arrival.  currently hypertensive. BP labile due to dysautonomia.  Walk with assistance.  Fall precautions.    6.  Chronic alcoholism.  Multivitamin and folate supplementation.     DVT ppx : None, heparin held by neurology  GI ppx: Tums     PT/OT: Following  Discharge Planning / SW: CM to assist with    Enrique Milan MD  Internal Medicine Resident, PGY-1  [facility]; [place], [place]  5/21/2022, 7:08 AM   Attending Physician Statement  I have discussed the care of Germán Stephenson, including pertinent history and exam findings,  with the resident. I have seen and examined the patient and the key elements of all parts of the encounter have been performed by me. I agree with the assessment, plan and orders as documented by the resident with additions . Treatment plan Discussed with nursing staff in detail , all questions answered . Electronically signed by Dick Betancourt MD on   5/21/22 at 1:13 PM EDT    Please note that this chart was generated using voice recognition Dragon dictation software. Although every effort was made to ensure the accuracy of this automated transcription, some errors in transcription may have occurred.

## 2022-05-21 NOTE — PLAN OF CARE
Problem: Discharge Planning  Goal: Discharge to home or other facility with appropriate resources  Outcome: Progressing     Problem: Skin/Tissue Integrity  Goal: Absence of new skin breakdown  Description: 1. Monitor for areas of redness and/or skin breakdown  2. Assess vascular access sites hourly  3. Every 4-6 hours minimum:  Change oxygen saturation probe site  4. Every 4-6 hours:  If on nasal continuous positive airway pressure, respiratory therapy assess nares and determine need for appliance change or resting period. Outcome: Progressing     Problem: Safety - Adult  Goal: Free from fall injury  Outcome: Progressing     Problem: Confusion  Goal: Confusion, delirium, dementia, or psychosis is improved or at baseline  Description: INTERVENTIONS:  1. Assess for possible contributors to thought disturbance, including medications, impaired vision or hearing, underlying metabolic abnormalities, dehydration, psychiatric diagnoses, and notify attending LIP  2. [place] high risk fall precautions, as indicated  3. Provide frequent short contacts to provide reality reorientation, refocusing and direction  4. Decrease environmental stimuli, including noise as appropriate  5. Monitor and intervene to maintain adequate nutrition, hydration, elimination, sleep and activity  6. If unable to ensure safety without constant attention obtain sitter and review sitter guidelines with assigned personnel  7.  Initiate Psychosocial CNS and Spiritual Care consult, as indicated  Outcome: Progressing     Problem: Pain  Goal: Verbalizes/displays adequate comfort level or baseline comfort level  Outcome: Progressing

## 2022-05-21 NOTE — FLOWSHEET NOTE
Patient was hooked up to 48 hr Holter, #[id number]. He is going to [facility], and he was given a  to return via [employer].

## 2022-05-22 LAB
ABSOLUTE EOS #: 0.17 K/UL (ref 0–0.44)
ABSOLUTE IMMATURE GRANULOCYTE: <0.03 K/UL (ref 0–0.3)
ABSOLUTE LYMPH #: 1.01 K/UL (ref 1.1–3.7)
ABSOLUTE MONO #: 0.48 K/UL (ref 0.1–1.2)
ANION GAP SERPL CALCULATED.3IONS-SCNC: 9 MMOL/L (ref 9–17)
BASOPHILS # BLD: 1 % (ref 0–2)
BASOPHILS ABSOLUTE: 0.03 K/UL (ref 0–0.2)
BUN BLDV-MCNC: 15 MG/DL (ref 8–23)
CALCIUM SERPL-MCNC: 6.6 MG/DL (ref 8.6–10.4)
CHLORIDE BLD-SCNC: 109 MMOL/L (ref 98–107)
CO2: 19 MMOL/L (ref 20–31)
CREAT SERPL-MCNC: 0.76 MG/DL (ref 0.7–1.2)
CULTURE: NORMAL
CULTURE: NORMAL
EOSINOPHILS RELATIVE PERCENT: 3 % (ref 1–4)
GFR AFRICAN AMERICAN: >60 ML/MIN
GFR NON-AFRICAN AMERICAN: >60 ML/MIN
GFR SERPL CREATININE-BSD FRML MDRD: ABNORMAL ML/MIN/{1.73_M2}
GLUCOSE BLD-MCNC: 100 MG/DL (ref 75–110)
GLUCOSE BLD-MCNC: 132 MG/DL (ref 75–110)
GLUCOSE BLD-MCNC: 60 MG/DL (ref 75–110)
GLUCOSE BLD-MCNC: 70 MG/DL (ref 75–110)
GLUCOSE BLD-MCNC: 70 MG/DL (ref 75–110)
GLUCOSE BLD-MCNC: 75 MG/DL (ref 70–99)
HCT VFR BLD CALC: 27.4 % (ref 40.7–50.3)
HEMOGLOBIN: 9.2 G/DL (ref 13–17)
IMMATURE GRANULOCYTES: 0 %
LYMPHOCYTES # BLD: 17 % (ref 24–43)
Lab: NORMAL
Lab: NORMAL
MCH RBC QN AUTO: 30.4 PG (ref 25.2–33.5)
MCHC RBC AUTO-ENTMCNC: 33.6 G/DL (ref 28.4–34.8)
MCV RBC AUTO: 90.4 FL (ref 82.6–102.9)
MONOCYTES # BLD: 8 % (ref 3–12)
NRBC AUTOMATED: 0 PER 100 WBC
PDW BLD-RTO: 14.3 % (ref 11.8–14.4)
PLATELET # BLD: 102 K/UL (ref 138–453)
PMV BLD AUTO: 12.6 FL (ref 8.1–13.5)
POTASSIUM SERPL-SCNC: 3.7 MMOL/L (ref 3.7–5.3)
RBC # BLD: 3.03 M/UL (ref 4.21–5.77)
SEG NEUTROPHILS: 71 % (ref 36–65)
SEGMENTED NEUTROPHILS ABSOLUTE COUNT: 4.3 K/UL (ref 1.5–8.1)
SODIUM BLD-SCNC: 137 MMOL/L (ref 135–144)
SPECIMEN DESCRIPTION: NORMAL
SPECIMEN DESCRIPTION: NORMAL
WBC # BLD: 6 K/UL (ref 3.5–11.3)

## 2022-05-22 PROCEDURE — 6370000000 HC RX 637 (ALT 250 FOR IP): Performed by: INTERNAL MEDICINE

## 2022-05-22 PROCEDURE — 6370000000 HC RX 637 (ALT 250 FOR IP): Performed by: STUDENT IN AN ORGANIZED HEALTH CARE EDUCATION/TRAINING PROGRAM

## 2022-05-22 PROCEDURE — 2500000003 HC RX 250 WO HCPCS: Performed by: STUDENT IN AN ORGANIZED HEALTH CARE EDUCATION/TRAINING PROGRAM

## 2022-05-22 PROCEDURE — 99291 CRITICAL CARE FIRST HOUR: CPT | Performed by: INTERNAL MEDICINE

## 2022-05-22 PROCEDURE — 2580000003 HC RX 258

## 2022-05-22 PROCEDURE — 2060000000 HC ICU INTERMEDIATE R&B

## 2022-05-22 PROCEDURE — 2580000003 HC RX 258: Performed by: STUDENT IN AN ORGANIZED HEALTH CARE EDUCATION/TRAINING PROGRAM

## 2022-05-22 PROCEDURE — 36415 COLL VENOUS BLD VENIPUNCTURE: CPT

## 2022-05-22 PROCEDURE — 6360000002 HC RX W HCPCS: Performed by: STUDENT IN AN ORGANIZED HEALTH CARE EDUCATION/TRAINING PROGRAM

## 2022-05-22 PROCEDURE — 80048 BASIC METABOLIC PNL TOTAL CA: CPT

## 2022-05-22 PROCEDURE — 85025 COMPLETE CBC W/AUTO DIFF WBC: CPT

## 2022-05-22 PROCEDURE — 82947 ASSAY GLUCOSE BLOOD QUANT: CPT

## 2022-05-22 RX ORDER — CALCIUM GLUCONATE 20 MG/ML
1000 INJECTION, SOLUTION INTRAVENOUS ONCE
Status: COMPLETED | OUTPATIENT
Start: 2022-05-22 | End: 2022-05-22

## 2022-05-22 RX ADMIN — CARBIDOPA AND LEVODOPA 1 TABLET: 25; 100 TABLET ORAL at 08:56

## 2022-05-22 RX ADMIN — DOXYCYCLINE HYCLATE 100 MG: 100 TABLET, COATED ORAL at 20:37

## 2022-05-22 RX ADMIN — CARBIDOPA AND LEVODOPA 1 TABLET: 25; 100 TABLET ORAL at 14:30

## 2022-05-22 RX ADMIN — SODIUM CHLORIDE, PRESERVATIVE FREE 10 ML: 5 INJECTION INTRAVENOUS at 20:37

## 2022-05-22 RX ADMIN — DOCUSATE SODIUM 50 MG AND SENNOSIDES 8.6 MG 2 TABLET: 8.6; 5 TABLET, FILM COATED ORAL at 08:57

## 2022-05-22 RX ADMIN — DOXYCYCLINE HYCLATE 100 MG: 100 TABLET, COATED ORAL at 08:57

## 2022-05-22 RX ADMIN — DEXTROSE MONOHYDRATE 125 ML: 100 INJECTION, SOLUTION INTRAVENOUS at 18:33

## 2022-05-22 RX ADMIN — SODIUM CHLORIDE, PRESERVATIVE FREE 10 ML: 5 INJECTION INTRAVENOUS at 08:57

## 2022-05-22 RX ADMIN — CARBIDOPA AND LEVODOPA 1 TABLET: 25; 100 TABLET ORAL at 20:37

## 2022-05-22 RX ADMIN — ENOXAPARIN SODIUM 40 MG: 100 INJECTION SUBCUTANEOUS at 08:56

## 2022-05-22 RX ADMIN — CALCIUM GLUCONATE 1000 MG: 20 INJECTION, SOLUTION INTRAVENOUS at 10:19

## 2022-05-22 RX ADMIN — FERROUS SULFATE TAB EC 325 MG (65 MG FE EQUIVALENT) 325 MG: 325 (65 FE) TABLET DELAYED RESPONSE at 08:56

## 2022-05-22 ASSESSMENT — PAIN SCALES - GENERAL
PAINLEVEL_OUTOF10: 0
PAINLEVEL_OUTOF10: 0

## 2022-05-22 NOTE — PROGRESS NOTES
[facility]  Internal Medicine Teaching Residency Program  Inpatient Daily Progress Note  ______________________________________________________________________________    Patient: Kate Estimable  YOB: 1945   PNI:[id number]    Acct: [de-identified]     Room: [facility]6665-84  Admit date: 5/15/2022  Today's date: 05/22/22  Number of days in the hospital: 7    SUBJECTIVE   Admitting Diagnosis: Weakness  CC: Syncopal episode  Pt examined at bedside. Chart & results reviewed. Pt feels better but lethargic, weak  Not had PT, OT due to weekend  Decreased oral intake, refuses oral nourishment shakes, says decreased appetite. Hemodynamically stable  Decrease fluids to 50ml/day    ROS:  Constitutional:  negative for chills, fevers, sweats  Respiratory:  negative for cough, dyspnea on exertion, hemoptysis, shortness of breath, wheezing  Cardiovascular:  negative for chest pain, chest pressure/discomfort, lower extremity edema, palpitations  Gastrointestinal:  negative for abdominal pain, constipation, diarrhea, nausea, vomiting  Neurological:  negative for dizziness, headache  BRIEF HISTORY     The patient is a pleasant 76 y. o. male with past medical history signifiacnt for parkinsonism disease, hypertension and hyperlipidemia presents through EMS with chief complaint of syncope that occurred just prior to arrival.  History was mostly provided by the caretaker the ER resident.      Patient is admitted for syncopal episode work-up.  Likely multifactorial due to dysautonomia, orthostatic hypotension, neurocardiogenic, neurogenic due to cervical spinal stenosis and canal narrowing.  Cardiology consulted for ruling out cardiogenic causes.  Plan for echo.     Dehydration secondary to decreased oral intake causing KALYAN, low blood pressure, orthostatic hypotension.   COVID-positive.  ID following  Neurology: Signed off  Echo LVEF > 55%  Holter monitoring on discharge per cardio  F/u with NSG in 4 weeks    OBJECTIVE     Vital Signs:  BP (!) 173/57   Pulse 73   Temp 98.7 °F (37.1 °C) (Oral)   Resp 20   Ht 5' 9.02\" (1.753 m)   Wt 140 lb (63.5 kg)   SpO2 97%   BMI 20.67 kg/m²     Temp (24hrs), Av.5 °F (36.9 °C), Min:97 °F (36.1 °C), Max:99.9 °F (37.7 °C)    In: 1200   Out: 600 [Urine:600]    Physical Exam:  Constitutional: This is a well developed, well nourished, 17-18.4 - Mild malnutrition / Protein energy malnutrition Grade I 77y.o. year old male who is alert, oriented, cooperative and in no apparent distress. Head:normocephalic and atraumatic. Mask facies and bilateral upper extremity tremors  EENT:  PERRLA. No conjunctival injections. Septum was midline, mucosa was without erythema, exudates or cobblestoning. No thrush was noted. Neck: Supple without thyromegaly. No elevated JVP. Trachea was midline. Respiratory: Chest was symmetrical without dullness to percussion. Breath sounds bilaterally were clear to auscultation. There were no wheezes, rhonchi or rales. There is no intercostal retraction or use of accessory muscles. No egophony noted. Cardiovascular: Regular without murmur, clicks, gallops or rubs. Abdomen: Slightly rounded and soft without organomegaly. No rebound, rigidity or guarding was appreciated. Lymphatic: No lymphadenopathy. Musculoskeletal: Normal curvature of the spine. B/l UE strength 4/5, normal tone ,difficulty to raise >180 degrees with pain, limited ROM on UE, normal LE. Extremities:  No lower extremity edema, ulcerations, tenderness, varicosities or erythema. Muscle size, tone and strength are normal.  No involuntary movements are noted. Skin:  Warm and dry. Good color, turgor and pigmentation. No lesions or scars.   No cyanosis or clubbing  Neurological/Psychiatric: The patient's general behavior, level of consciousness, thought content and emotional status is normal.        Medications:  Scheduled Medications:    ferrous sulfate  325 mg Oral Daily with breakfast    doxycycline hyclate  100 mg Oral 2 times per day    enoxaparin  40 mg SubCUTAneous Daily    vitamin D  50,000 Units Oral Weekly    sodium chloride  250 mL IntraVENous Once    sennosides-docusate sodium  2 tablet Oral Daily    carbidopa-levodopa  1 tablet Oral TID    sodium chloride flush  5-40 mL IntraVENous 2 times per day     Continuous Infusions:    dextrose 100 mL/hr (05/19/22 0340)    sodium chloride 100 mL/hr at 05/22/22 0600    sodium chloride       PRN Medicationssodium chloride, 30 mL, PRN  glucose, 4 tablet, PRN  dextrose bolus, 125 mL, PRN   Or  dextrose bolus, 250 mL, PRN  glucagon (rDNA), 1 mg, PRN  dextrose, 100 mL/hr, PRN  sodium chloride flush, 10 mL, PRN  sodium chloride flush, 5-40 mL, PRN  sodium chloride, , PRN  ondansetron, 4 mg, Q8H PRN   Or  ondansetron, 4 mg, Q6H PRN  polyethylene glycol, 17 g, Daily PRN  acetaminophen, 650 mg, Q6H PRN   Or  acetaminophen, 650 mg, Q6H PRN        Diagnostic Labs:  CBC:   Recent Labs     05/20/22  1234 05/21/22  0556 05/22/22  0512   WBC 5.1 4.6 6.0   RBC 3.11* 3.05* 3.03*   HGB 9.5* 9.2* 9.2*   HCT 28.2* 27.3* 27.4*   MCV 90.7 89.5 90.4   RDW 14.0 14.1 14.3   PLT 92* See Reflexed IPF Result 102*     BMP:   Recent Labs     05/20/22  0819 05/21/22  0556 05/22/22  0512    138 137   K 3.4* 3.5* 3.7   * 109* 109*   CO2 22 19* 19*   BUN 20 18 15   CREATININE 0.92 0.88 0.76     BNP: No results for input(s): BNP in the last 72 hours. PT/INR: No results for input(s): PROTIME, INR in the last 72 hours. APTT: No results for input(s): APTT in the last 72 hours. CARDIAC ENZYMES: No results for input(s): CKMB, CKMBINDEX, TROPONINI in the last 72 hours.     Invalid input(s): CKTOTAL;3  FASTING LIPID PANEL:No results found for: CHOL, HDL, TRIG  LIVER PROFILE:   Recent Labs     05/20/22  0819   AST 28   ALT 6   BILIDIR 0.10   BILITOT 0.23*   ALKPHOS 41      MICROBIOLOGY:   Lab Results   Component Value Date/Time    CULTURE NO GROWTH 5 DAYS 05/17/2022 09:05 AM       Imaging:    XR SHOULDER RIGHT (MIN 2 VIEWS)    Result Date: 5/19/2022  Moderate glenohumeral and moderate to severe acromioclavicular joint degenerative change without fracture. High riding humeral head indicative of chronic high-grade rotator cuff pathology. CT Head WO Contrast    Result Date: 5/15/2022  Markedly limited exam due to motion artifact. No gross acute intracranial abnormality. CT Cervical Spine WO Contrast    Result Date: 5/15/2022  No acute abnormality of the cervical spine. Advanced multilevel degenerative disc disease and facet arthropathy. CT Lumbar Spine WO Contrast    Result Date: 5/15/2022  Chest CT angiogram: No evidence of pulmonary embolism or acute pulmonary abnormality. Cholelithiasis. Moderate free fluid within peritoneal cavity may be suggestive of ascites. CT of thoracic spine: No acute osseous abnormality. No fracture. CT of lumbar spine: No acute osseous abnormality. No fracture. Mild levoscoliosis of thoracolumbar junction. Multiple round hypodense lesions are noted throughout the lumbar spine most pronounced within L3 vertebral body. Findings may be related to osteopenia and subchondral cystic change. Marrow infiltrative lesions cannot be completely excluded. RECOMMENDATIONS: Unavailable     MRI CERVICAL SPINE WO CONTRAST    Result Date: 5/17/2022  1. Moderate spinal canal stenosis and severe bilateral neural foraminal narrowing at C4-C5 secondary to a posterior disc osteophyte complex, uncovertebral overgrowth and facet arthropathy. There is mild increased T2 signal intensity within the spinal cord at this level suggesting spondylotic myelopathy. 2. Moderate spinal canal stenosis and severe bilateral neural foraminal narrowing at C3-C4, as described above. 3. Mild spinal canal stenosis from C5-C6 to C7-T1. 4. Severe bilateral neural foraminal narrowing at C6-C7 and C7-T1, as described above.      MRI LUMBAR SPINE W WO CONTRAST    Result Date: 5/15/2022  The previously noted round hypodense lesions throughout the lumbar spine correspond to subchondral cystic change/degenerative findings. No evidence of marrow infiltrative lesion is noted. Partial visualization of a massive cystic structure within the right side of peritoneal cavity, measuring approximately 14 cm. The lesion does not appear to be arising from the right kidney. If there is need for further evaluation, CT of the abdomen and pelvis can be obtained. At L5-S1, grade 1 anterolisthesis, bilateral pars defects and severe bilateral neural foraminal narrowing RECOMMENDATIONS: Unavailable     XR CHEST PORTABLE    Result Date: 5/20/2022  Elevation of the right hemidiaphragm with right basilar airspace opacities. These opacities could represent atelectasis or in the appropriate clinical setting pneumonia. XR CHEST PORTABLE    Result Date: 5/17/2022  No acute cardiopulmonary findings. XR CHEST PORTABLE    Result Date: 5/15/2022  No acute cardiopulmonary disease. CT CHEST PULMONARY EMBOLISM W CONTRAST    Result Date: 5/15/2022  Chest CT angiogram: No evidence of pulmonary embolism or acute pulmonary abnormality. Cholelithiasis. Moderate free fluid within peritoneal cavity may be suggestive of ascites. CT of thoracic spine: No acute osseous abnormality. No fracture. CT of lumbar spine: No acute osseous abnormality. No fracture. Mild levoscoliosis of thoracolumbar junction. Multiple round hypodense lesions are noted throughout the lumbar spine most pronounced within L3 vertebral body. Findings may be related to osteopenia and subchondral cystic change. Marrow infiltrative lesions cannot be completely excluded. RECOMMENDATIONS: Unavailable     XR CERVICAL SPINE FLEXION AND EXTENSION    Result Date: 5/17/2022  Advanced multilevel spondylosis without any findings of dynamic instability. Overall limited range of motion.      MRI BRAIN WO CONTRAST    Result Date: 5/17/2022  1. No acute infarct or acute intracranial process identified. 2. Diffuse cerebral volume loss and moderate chronic small vessel ischemic changes. FL MODIFIED BARIUM SWALLOW W VIDEO    Result Date: 5/17/2022  1. No aspiration. 2. Deep laryngeal penetration of nectar thick liquid and thin liquid. Please see separate speech pathology report for full discussion of findings and recommendations. CT THORACIC SPINE TRAUMA RECONSTRUCTION    Result Date: 5/15/2022  Chest CT angiogram: No evidence of pulmonary embolism or acute pulmonary abnormality. Cholelithiasis. Moderate free fluid within peritoneal cavity may be suggestive of ascites. CT of thoracic spine: No acute osseous abnormality. No fracture. CT of lumbar spine: No acute osseous abnormality. No fracture. Mild levoscoliosis of thoracolumbar junction. Multiple round hypodense lesions are noted throughout the lumbar spine most pronounced within L3 vertebral body. Findings may be related to osteopenia and subchondral cystic change. Marrow infiltrative lesions cannot be completely excluded. RECOMMENDATIONS: Unavailable       ASSESSMENT & PLAN     Principal Problem:    Weakness  Active Problems:    Parkinson's disease ([facility] [address]    COVID-19    Cervical myelopathy (HCC)    Iron deficiency anemia    Thrombocytopenia (HCC)    CRP elevated  Resolved Problems:    Syncope and collapse    Hypotension    KALYAN (acute kidney injury) (HCC)    Orthostatic hypertension    1. Syncope and collapse.  Multifactorial.  Likely 2/2 orthostatic hypotension 2/2 dysautonomia due to Parkinson's disease/dehydration vs cervical myelopathy vs cardiogenic vs COVID infection.  MRI cervical spine shows cervical canal narrowing with myelopathy. Decrease IVFs. Encourage oral intake (has poor appetite, refuses oral intake). Echo LVEF >55 %. Holter monitoring as OP. follow up with cardio. 2. Cervical myelopathy: Symptomatic. Neurosurgery evaluated.  OP surgery follow up. 3. Parkinson's disease.  Continue on Sinemet 1 tablet 3 times daily. 4. COVID-19 infection, saturating on room air.  Airborne precautions. S/p  Remdesivir for 5 days. Discharge with Paxlovid 5 days and Doxy for IV site erythema. 5. KALYAN.  Resolved  6. Orthostatic hypotension: Resolved. Encourage oral intake, DARVIN compressions, PT, OT.  Fall precautions.    7. Chronic alcoholism.  Multivitamin and folate supplementation.     DVT ppx : lovenox  GI ppx: not needed  PT/OT: Following  Discharge Planning / Santi Zelaya MD  Internal Medicine Resident, PGY-3  [facility]; [place], [place]  5/22/2022, 11:30 AM   Attending Physician Statement  I have discussed the care of Eleonora Grimes, including pertinent history and exam findings,  with the resident. I have seen and examined the patient and the key elements of all parts of the encounter have been performed by me. I agree with the assessment, plan and orders as documented by the resident with additions . Acute  hypoxic resp. Failure due to COVID    Treatment plan Discussed with nursing staff in detail , all questions answered . Electronically signed by Yasmine Brock MD on   5/22/22 at 1:57 PM EDT  CC time 30 minutes  Please note that this chart was generated using voice recognition Dragon dictation software. Although every effort was made to ensure the accuracy of this automated transcription, some errors in transcription may have occurred.

## 2022-05-22 NOTE — PLAN OF CARE
Problem: Discharge Planning  Goal: Discharge to home or other facility with appropriate resources  Outcome: Progressing     Problem: Skin/Tissue Integrity  Goal: Absence of new skin breakdown  Description: 1. Monitor for areas of redness and/or skin breakdown  2. Assess vascular access sites hourly  3. Every 4-6 hours minimum:  Change oxygen saturation probe site  4. Every 4-6 hours:  If on nasal continuous positive airway pressure, respiratory therapy assess nares and determine need for appliance change or resting period. Outcome: Progressing     Problem: Safety - Adult  Goal: Free from fall injury  Outcome: Progressing     Problem: Confusion  Goal: Confusion, delirium, dementia, or psychosis is improved or at baseline  Description: INTERVENTIONS:  1. Assess for possible contributors to thought disturbance, including medications, impaired vision or hearing, underlying metabolic abnormalities, dehydration, psychiatric diagnoses, and notify attending LIP  2. [place] high risk fall precautions, as indicated  3. Provide frequent short contacts to provide reality reorientation, refocusing and direction  4. Decrease environmental stimuli, including noise as appropriate  5. Monitor and intervene to maintain adequate nutrition, hydration, elimination, sleep and activity  6. If unable to ensure safety without constant attention obtain sitter and review sitter guidelines with assigned personnel  7.  Initiate Psychosocial CNS and Spiritual Care consult, as indicated  Outcome: Progressing     Problem: Pain  Goal: Verbalizes/displays adequate comfort level or baseline comfort level  Outcome: Progressing

## 2022-05-23 PROBLEM — E44.0 MODERATE PROTEIN-CALORIE MALNUTRITION (HCC): Chronic | Status: ACTIVE | Noted: 2022-05-23

## 2022-05-23 PROBLEM — R53.1 WEAKNESS: Status: RESOLVED | Noted: 2022-05-15 | Resolved: 2022-05-23

## 2022-05-23 LAB
ABSOLUTE EOS #: 0.2 K/UL (ref 0–0.4)
ABSOLUTE IMMATURE GRANULOCYTE: 0 K/UL (ref 0–0.3)
ABSOLUTE LYMPH #: 0.94 K/UL (ref 1–4.8)
ABSOLUTE MONO #: 0.34 K/UL (ref 0.1–0.8)
ANION GAP SERPL CALCULATED.3IONS-SCNC: 6 MMOL/L (ref 9–17)
BASOPHILS # BLD: 0 % (ref 0–2)
BASOPHILS ABSOLUTE: 0 K/UL (ref 0–0.2)
BUN BLDV-MCNC: 13 MG/DL (ref 8–23)
CALCIUM SERPL-MCNC: 7.1 MG/DL (ref 8.6–10.4)
CHLORIDE BLD-SCNC: 107 MMOL/L (ref 98–107)
CO2: 21 MMOL/L (ref 20–31)
CREAT SERPL-MCNC: 0.77 MG/DL (ref 0.7–1.2)
EOSINOPHILS RELATIVE PERCENT: 3 % (ref 1–4)
GFR AFRICAN AMERICAN: >60 ML/MIN
GFR NON-AFRICAN AMERICAN: >60 ML/MIN
GFR SERPL CREATININE-BSD FRML MDRD: ABNORMAL ML/MIN/{1.73_M2}
GLUCOSE BLD-MCNC: 59 MG/DL (ref 75–110)
GLUCOSE BLD-MCNC: 70 MG/DL (ref 75–110)
GLUCOSE BLD-MCNC: 72 MG/DL (ref 75–110)
GLUCOSE BLD-MCNC: 78 MG/DL (ref 70–99)
GLUCOSE BLD-MCNC: 82 MG/DL (ref 75–110)
GLUCOSE BLD-MCNC: 88 MG/DL (ref 75–110)
GLUCOSE BLD-MCNC: 88 MG/DL (ref 75–110)
GLUCOSE BLD-MCNC: 90 MG/DL (ref 75–110)
HCT VFR BLD CALC: 29.9 % (ref 40.7–50.3)
HEMOGLOBIN: 10.2 G/DL (ref 13–17)
IMMATURE GRANULOCYTES: 0 %
LYMPHOCYTES # BLD: 14 % (ref 24–44)
MAGNESIUM: 1.6 MG/DL (ref 1.6–2.6)
MCH RBC QN AUTO: 30.7 PG (ref 25.2–33.5)
MCHC RBC AUTO-ENTMCNC: 34.1 G/DL (ref 28.4–34.8)
MCV RBC AUTO: 90.1 FL (ref 82.6–102.9)
MONOCYTES # BLD: 5 % (ref 1–7)
MORPHOLOGY: ABNORMAL
MORPHOLOGY: ABNORMAL
NRBC AUTOMATED: 0 PER 100 WBC
PATHOLOGIST REVIEW: NORMAL
PDW BLD-RTO: 14.2 % (ref 11.8–14.4)
PLATELET # BLD: ABNORMAL K/UL (ref 138–453)
PLATELET, FLUORESCENCE: 129 K/UL (ref 138–453)
PLATELET, IMMATURE FRACTION: 5.6 % (ref 1.1–10.3)
POTASSIUM SERPL-SCNC: 3.5 MMOL/L (ref 3.7–5.3)
RBC # BLD: 3.32 M/UL (ref 4.21–5.77)
SEG NEUTROPHILS: 78 % (ref 36–66)
SEGMENTED NEUTROPHILS ABSOLUTE COUNT: 5.22 K/UL (ref 1.8–7.7)
SODIUM BLD-SCNC: 134 MMOL/L (ref 135–144)
SURGICAL PATHOLOGY REPORT: NORMAL
WBC # BLD: 6.7 K/UL (ref 3.5–11.3)

## 2022-05-23 PROCEDURE — 80048 BASIC METABOLIC PNL TOTAL CA: CPT

## 2022-05-23 PROCEDURE — 2580000003 HC RX 258

## 2022-05-23 PROCEDURE — 6370000000 HC RX 637 (ALT 250 FOR IP): Performed by: INTERNAL MEDICINE

## 2022-05-23 PROCEDURE — 6370000000 HC RX 637 (ALT 250 FOR IP): Performed by: STUDENT IN AN ORGANIZED HEALTH CARE EDUCATION/TRAINING PROGRAM

## 2022-05-23 PROCEDURE — 97116 GAIT TRAINING THERAPY: CPT

## 2022-05-23 PROCEDURE — 2060000000 HC ICU INTERMEDIATE R&B

## 2022-05-23 PROCEDURE — 36415 COLL VENOUS BLD VENIPUNCTURE: CPT

## 2022-05-23 PROCEDURE — 99232 SBSQ HOSP IP/OBS MODERATE 35: CPT | Performed by: INTERNAL MEDICINE

## 2022-05-23 PROCEDURE — 6360000002 HC RX W HCPCS: Performed by: STUDENT IN AN ORGANIZED HEALTH CARE EDUCATION/TRAINING PROGRAM

## 2022-05-23 PROCEDURE — 85055 RETICULATED PLATELET ASSAY: CPT

## 2022-05-23 PROCEDURE — 85025 COMPLETE CBC W/AUTO DIFF WBC: CPT

## 2022-05-23 PROCEDURE — 83735 ASSAY OF MAGNESIUM: CPT

## 2022-05-23 PROCEDURE — 97110 THERAPEUTIC EXERCISES: CPT

## 2022-05-23 PROCEDURE — 2500000003 HC RX 250 WO HCPCS

## 2022-05-23 RX ORDER — SENNA AND DOCUSATE SODIUM 50; 8.6 MG/1; MG/1
2 TABLET, FILM COATED ORAL DAILY PRN
Status: DISCONTINUED | OUTPATIENT
Start: 2022-05-23 | End: 2022-05-27 | Stop reason: HOSPADM

## 2022-05-23 RX ORDER — DEXTROSE AND SODIUM CHLORIDE 5; .9 G/100ML; G/100ML
INJECTION, SOLUTION INTRAVENOUS CONTINUOUS
Status: DISCONTINUED | OUTPATIENT
Start: 2022-05-23 | End: 2022-05-23

## 2022-05-23 RX ORDER — DEXTROSE AND SODIUM CHLORIDE 5; .9 G/100ML; G/100ML
INJECTION, SOLUTION INTRAVENOUS CONTINUOUS
Status: ACTIVE | OUTPATIENT
Start: 2022-05-23 | End: 2022-05-23

## 2022-05-23 RX ORDER — DEXTROSE AND SODIUM CHLORIDE 5; .9 G/100ML; G/100ML
INJECTION, SOLUTION INTRAVENOUS CONTINUOUS
Status: DISCONTINUED | OUTPATIENT
Start: 2022-05-23 | End: 2022-05-24

## 2022-05-23 RX ADMIN — CARBIDOPA AND LEVODOPA 1 TABLET: 25; 100 TABLET ORAL at 08:13

## 2022-05-23 RX ADMIN — CARBIDOPA AND LEVODOPA 1 TABLET: 25; 100 TABLET ORAL at 13:47

## 2022-05-23 RX ADMIN — DEXTROSE AND SODIUM CHLORIDE: 5; 900 INJECTION, SOLUTION INTRAVENOUS at 20:01

## 2022-05-23 RX ADMIN — ENOXAPARIN SODIUM 40 MG: 100 INJECTION SUBCUTANEOUS at 08:12

## 2022-05-23 RX ADMIN — FERROUS SULFATE TAB EC 325 MG (65 MG FE EQUIVALENT) 325 MG: 325 (65 FE) TABLET DELAYED RESPONSE at 08:12

## 2022-05-23 RX ADMIN — DEXTROSE AND SODIUM CHLORIDE: 5; 900 INJECTION, SOLUTION INTRAVENOUS at 08:14

## 2022-05-23 RX ADMIN — OLANZAPINE 5 MG: 10 INJECTION, POWDER, LYOPHILIZED, FOR SOLUTION INTRAMUSCULAR at 21:26

## 2022-05-23 RX ADMIN — DOXYCYCLINE HYCLATE 100 MG: 100 TABLET, COATED ORAL at 08:12

## 2022-05-23 RX ADMIN — DOCUSATE SODIUM 50 MG AND SENNOSIDES 8.6 MG 2 TABLET: 8.6; 5 TABLET, FILM COATED ORAL at 08:12

## 2022-05-23 RX ADMIN — SODIUM CHLORIDE, PRESERVATIVE FREE 10 ML: 5 INJECTION INTRAVENOUS at 08:12

## 2022-05-23 RX ADMIN — SODIUM CHLORIDE, PRESERVATIVE FREE 10 ML: 5 INJECTION INTRAVENOUS at 21:26

## 2022-05-23 ASSESSMENT — ENCOUNTER SYMPTOMS
COUGH: 0
PHOTOPHOBIA: 0
EYE DISCHARGE: 0
APNEA: 0
ABDOMINAL DISTENTION: 0
COLOR CHANGE: 0

## 2022-05-23 NOTE — CARE COORDINATION
Received voicemail from Alvaro at [facility] me that I will need to start precert for [facility]. Called her back and left a voicemail, informing her that I do not have [facility] listed for this patient, and to please call back with the [facility] information. [address] left another voicemail    [phone]  Called [facility] and spoke to Alvaro and informed her that the patient does not have a [employer] and that medicare is primary. She tells me she will speak to the business office and get back with me.     [address] from [place] called and tells me that I will need to send a referral to to the [facility]. I informed her that the patient has Medicare as primary. She tells me that if the patient wants to have the [facility] to pay the 20% aft the 20 days that it needs to be prior auth from the [facility]. I told her that we do not do that. She states that in order to accept the patient that this needs to be completed. She provided me with the phone number for the [facility] [phone]. Called the number. Spoke to Kaila Ace she tells me that I need to contact community Care at [phone]. Left voicemail asking for a return call. Called Alvaro back to inform her that I had to leave a voicemail asking for a return call.

## 2022-05-23 NOTE — PROGRESS NOTES
Comprehensive Nutrition Assessment    Type and Reason for Visit:  Reassess    Nutrition Recommendations/Plan:   1. Continue diet per SLP, Dysphagia Minced and Moist (Dysphagia II) with Moderately Thick (Honey) liquids   2. Continue Frozen ONS TID  3. Encourage/assist po intake, may need to consider alternate means of nutrition if poor po intake continues   4. Monitor labs, weights, plan of care     Malnutrition Assessment:  Malnutrition Status: Moderate malnutrition (to severe) (05/16/22 1323)    Context:  Chronic Illness     Findings of the 6 clinical characteristics of malnutrition:  Energy Intake:  Unable to assess  Weight Loss:  Unable to assess     Body Fat Loss:  Mild body fat loss (to moderate) Buccal region,Triceps   Muscle Mass Loss:  Severe muscle mass loss Clavicles (pectoralis & deltoids)  Fluid Accumulation:  No significant fluid accumulation     Strength:  Not Performed    Nutrition Assessment:    Chart reviewed. Episodes of hypoglycemia overnight to 62 mg/dl. Decreased oral intake, refusing oral intake due to decreased appetite. D5 NS @75 ml/hr. Plan to talk with family regarding further plan of care. Per nurse pt took 1 bite of breakfast this morning, glucose continues to be low. Wts reviewed - will continue to monitor. Nutrition Related Findings:    Labs reviewed: Ca 7.1 mg/dL. Na 134 mmol/L. K 3.5 mmol/L. Meds reviewed: dextrose 5 % and 0.9 % sodium chloride infusion. LBM 5/21. Wound Type: None       Current Nutrition Intake & Therapies:    Average Meal Intake: Refusing to eat  Average Supplements Intake: Refusing to take  ADULT DIET;  Dysphagia - Minced and Moist; Moderately Thick (Honey)  ADULT ORAL NUTRITION SUPPLEMENT; Breakfast, Lunch, Dinner; Frozen Oral Supplement  Additional Calorie Sources:  · dextrose 5 % and 0.9 % sodium chloride at 100 mL/hr = 408 kcal      Anthropometric Measures:  Height: 5' 9.02\" (175.3 cm)  Ideal Body Weight (IBW): 160 lbs (73 kg)    Admission Body Weight: 113 lb 9.6 oz (51.5 kg)  Current Body Weight: 140 lb 3.2 oz (63.6 kg) (suspect scale error ?),   IBW. Weight Source: Bed Scale  Current BMI (kg/m2): 20.7        Weight Adjustment For: No Adjustment                 BMI Categories: Underweight (BMI less than 22) age over 72    Estimated Daily Nutrient Needs:  Energy Requirements Based On: Kcal/kg     Energy (kcal/day): 1764-9361 kcals/day (30-33 kcal/kg)  Weight Used for Protein Requirements: Current  Protein (g/day): 80 g/day (1.5)     Fluid (ml/day): per MD    Nutrition Diagnosis:   · Inadequate oral intake related to cognitive or neurological impairment as evidenced by intake 0-25%, refusing intake d/t poor appetite per chart review      Nutrition Interventions:   Food and/or Nutrient Delivery: Continue Current Diet,Continue Oral Nutrition Supplement (If poor po intake continues, may need to consider alternate means of nutrition)  Nutrition Education/Counseling: No recommendation at this time  Coordination of Nutrition Care: Continue to monitor while inpatient       Goals:  Previous Goal Met: Progress towards Goal(s) Declining  Goals: Meet at least 75% of estimated needs,prior to discharge       Nutrition Monitoring and Evaluation:   Behavioral-Environmental Outcomes: None Identified  Food/Nutrient Intake Outcomes: Food and Nutrient Intake,Supplement Intake  Physical Signs/Symptoms Outcomes: Biochemical Data,Nutrition Focused Physical Findings,Chewing or Swallowing,Weight    Discharge Planning:     Too soon to determine     Marlin Glass N [address] [place] [address]  Contact: [phone]

## 2022-05-23 NOTE — PROGRESS NOTES
Physical Therapy  Facility/Department: [address] STEPDOWN  Physical Therapy Daily Treatment Note    Name: Jean Claude Medeiros  : 1945  MRN: [id number]  Date of Service: 2022    Discharge Recommendations:  Patient would benefit from continued therapy after discharge   PT Equipment Recommendations  Equipment Needed: Yes  Mobility Devices: Therisa Dhaliwal: Rolling      Patient Diagnosis(es): The primary encounter diagnosis was Syncope and collapse. A diagnosis of Thrombocytopenia (HCC) was also pertinent to this visit. Past Medical History:  has no past medical history on file. Past Surgical History:  has no past surgical history on file. Assessment   Body Structures, Functions, Activity Limitations Requiring Skilled Therapeutic Intervention: Decreased functional mobility ; Decreased strength;Decreased safe awareness;Decreased cognition;Decreased endurance;Decreased balance;Decreased fine motor control;Decreased coordination;Decreased posture  Assessment: Pt required modA for bed mobility and was able to sit EOB with CGA. Pt stood and ambulated ~25ft with use of RW and CGA/Lizzy. Pt limited by decreased strength and hx or parkinsons. Would recommend continued PT to address further balance and strength deficits and improve overall functional independence. Therapy Prognosis: Fair  Barriers to Learning: cognition, hearing  Requires PT Follow-Up: Yes  Activity Tolerance  Activity Tolerance: Patient limited by endurance; Patient limited by fatigue     Plan   Plan  Plan:  (5-6x/week)  Current Treatment Recommendations: Strengthening,Balance training,ROM,Functional mobility training,Transfer training,Endurance training,Therapeutic activities,Safety education & training,Equipment evaluation, education, & procurement,Gait training  Safety Devices  Type of Devices: Call light within reach,Gait belt,Nurse notified,Heels elevated for pressure relief,Bed alarm in place,Left in bed  Restraints  Restraints Initially in Place: No Restrictions  Restrictions/Precautions  Restrictions/Precautions: Fall Risk,Up as Tolerated (droplet plus isolation.)  Required Braces or Orthoses?: No  Position Activity Restriction  Other position/activity restrictions: hx of Parkinson's, COVID+     Subjective   General  Chart Reviewed: Yes  Patient assessed for rehabilitation services?: Yes  Response To Previous Treatment: Patient with no complaints from previous session. Family / Caregiver Present: No  Follows Commands: Impaired (requires redirection and repetition.)  General Comment  Comments: Pt returned to supine in bed at end of session. Subjective  Subjective: Pt and RN agreeable to PT this afternoon. Pt supine in bed upon arrival with no c/o pain. Pt pleasant and cooperative throughout session although very [place]. Cognition   Orientation  Overall Orientation Status: Within Functional Limits  Orientation Level: Oriented X4  Cognition  Overall Cognitive Status: Exceptions  Arousal/Alertness: Delayed responses to stimuli  Following Commands: Follows multistep commands with increased time; Follows multistep commands with repitition  Attention Span: Attends with cues to redirect  Memory: Decreased recall of recent events  Safety Judgement: Decreased awareness of need for assistance;Decreased awareness of need for safety  Problem Solving: Assistance required to generate solutions;Assistance required to correct errors made  Insights: Decreased awareness of deficits  Initiation: Requires cues for all  Sequencing: Requires cues for all     Objective   Bed mobility  Supine to Sit: Moderate assistance  Sit to Supine: Minimal assistance;2 Person assistance  Scooting: Minimal assistance  Bed Mobility Comments: HOB elevated, required assistance with trunk and LE progression throughout.   Transfers  Sit to Stand: Minimal Assistance  Stand to sit: Minimal Assistance  Comment: RW used for STS transfer, cueing for hand placement with poor return demo.  Ambulation  Surface: level tile  Device: Rolling Walker  Other Apparatus: O2  Assistance: Contact guard assistance;Minimal assistance  Quality of Gait: B knees flexed, forward flexed posture. Gait Deviations: Slow Yoselin;Decreased step length;Shuffles  Distance: 25ft within room  Comments: Pt overall slow and steady with no noted LOB. More Ambulation?: No  Stairs/Curb  Stairs?: No     Balance  Posture: Fair  Sitting - Static: Fair;+  Sitting - Dynamic: Fair;-  Standing - Static: Fair  Standing - Dynamic: Poor  Comments: RW used to assess standing balance. Pt able to sit EOB with CGA. Exercise Treatment:  Seated LE exercise program: [employer], heel/toe raises, and marches. Reps: x10         AM-PAC Score  AM-PAC Inpatient Mobility Raw Score : 16 (05/23/22 1602)  AM-PAC Inpatient T-Scale Score : 40.78 (05/23/22 1602)  Mobility Inpatient CMS 0-100% Score: 54.16 (05/23/22 1602)  Mobility Inpatient CMS G-Code Modifier : CK (05/23/22 1602)          Goals  Short Term Goals  Time Frame for Short term goals: 14 visits  Short term goal 1: Perform bed mobility with Min A  Short term goal 2: Perform sit to stand tranfers with Min A  Short term goal 3: Ambulate 200ft with RW and CGA  Short term goal 4: Demo Fair dynamic standing balance to decrease risk of falls  Short term goal 5: Participate in 30 minutes of therapy to demo increased enduranc  Additional Goals?: No       Education  Patient Education  Education Given To: Patient  Education Provided: Role of Therapy;Plan of Care;Transfer Training;Home Exercise Program  Education Method: Verbal  Barriers to Learning: Cognition; Hearing  Education Outcome: Verbalized understanding;Continued education needed      Therapy Time   Individual Concurrent Group Co-treatment   Time In 1410         Time Out 1439         Minutes 29         Timed Code Treatment Minutes: [address], PTA

## 2022-05-23 NOTE — PROGRESS NOTES
[facility]  Internal Medicine Teaching Residency Program  Inpatient Daily Progress Note  ______________________________________________________________________________    Patient: Saul Alvarez  YOB: 1945   JTO:[id number]    Acct: [de-identified]     Room: [id number]  Admit date: 5/15/2022  Today's date: 05/23/22  Number of days in the hospital: 8    SUBJECTIVE   Admitting Diagnosis: Weakness  CC: Syncopal episode  Pt examined at bedside. Chart & results reviewed. Episodes of hypoglycemia overnight to 62 mg/dl  Decreased oral intake, refusing oral intake due to decreased appetite  Blood pressure is labile, hypertension to 173/57 overnight, HR 77      ROS:  Constitutional:  negative for chills, fevers, sweats  Respiratory:  negative for cough, dyspnea on exertion, hemoptysis, shortness of breath, wheezing  Cardiovascular:  negative for chest pain, chest pressure/discomfort, lower extremity edema, palpitations  Gastrointestinal:  negative for abdominal pain, constipation, diarrhea, nausea, vomiting  Neurological: positive for dizziness, neg for headache  BRIEF HISTORY     The patient is a pleasant 76 y. o. male with past medical history signifiacnt for parkinsonism disease, hypertension and hyperlipidemia presents through EMS with chief complaint of syncope that occurred just prior to arrival.  History was mostly provided by the caretaker the ER resident.      Patient is admitted for syncopal episode work-up.  Likely multifactorial due to dysautonomia, orthostatic hypotension, neurocardiogenic, neurogenic due to cervical spinal stenosis and canal narrowing.  Cardiology consulted for ruling out cardiogenic causes.  Plan for echo.     Dehydration secondary to decreased oral intake causing KALYAN, low blood pressure, orthostatic hypotension.   COVID-positive.  ID following  Neurology: Signed off  Echo LVEF > 55%  Holter monitoring on discharge per cardio  F/u with NSG in 4 weeks. OBJECTIVE     Vital Signs:  BP (!) 151/64   Pulse 70   Temp 98.1 °F (36.7 °C) (Axillary)   Resp 21   Ht 5' 9.02\" (1.753 m)   Wt 140 lb 3.2 oz (63.6 kg)   SpO2 98%   BMI 20.70 kg/m²     Temp (24hrs), Av.1 °F (36.7 °C), Min:97.6 °F (36.4 °C), Max:98.7 °F (37.1 °C)    In: 657.4   Out: -     Physical Exam:  Constitutional: This is a well developed, well nourished, 18.5-24.9 - Normal 77y.o. year old male who is alert, oriented, cooperative and in no apparent distress. Head:normocephalic and atraumatic. Masklike facies  EENT:  PERRLA. No conjunctival injections. Septum was midline, mucosa was without erythema, exudates or cobblestoning. No thrush was noted. Neck: Supple without thyromegaly. No elevated JVP. Trachea was midline. Respiratory: Chest was symmetrical without dullness to percussion. Breath sounds bilaterally were clear to auscultation. There were no wheezes, rhonchi or rales. There is no intercostal retraction or use of accessory muscles. No egophony noted. Cardiovascular: Regular without murmur, clicks, gallops or rubs. Abdomen: Slightly rounded and soft without organomegaly. No rebound, rigidity or guarding was appreciated. Lymphatic: No lymphadenopathy. Musculoskeletal: Normal curvature of the spine. Bilateral upper extremity tremors, decreased strength 4/5. Extremities:  No lower extremity edema, ulcerations, tenderness, varicosities or erythema. Muscle size, tone and strength are normal.  No involuntary movements are noted. Skin:  Warm and dry. Good color, turgor and pigmentation. No lesions or scars.   No cyanosis or clubbing  Neurological/Psychiatric: The patient's general behavior, level of consciousness, thought content and emotional status is normal.        Medications:  Scheduled Medications:    ferrous sulfate  325 mg Oral Daily with breakfast    doxycycline hyclate  100 mg Oral 2 times per day    enoxaparin  40 mg SubCUTAneous Daily    glenohumeral and moderate to severe acromioclavicular joint degenerative change without fracture. High riding humeral head indicative of chronic high-grade rotator cuff pathology. MRI CERVICAL SPINE WO CONTRAST    Result Date: 5/17/2022  1. Moderate spinal canal stenosis and severe bilateral neural foraminal narrowing at C4-C5 secondary to a posterior disc osteophyte complex, uncovertebral overgrowth and facet arthropathy. There is mild increased T2 signal intensity within the spinal cord at this level suggesting spondylotic myelopathy. 2. Moderate spinal canal stenosis and severe bilateral neural foraminal narrowing at C3-C4, as described above. 3. Mild spinal canal stenosis from C5-C6 to C7-T1. 4. Severe bilateral neural foraminal narrowing at C6-C7 and C7-T1, as described above. XR CHEST PORTABLE    Result Date: 5/21/2022  1. Stable mild bibasilar airspace disease and small bilateral pleural effusions. Overall, stable chest x-ray from 05/20/2022, 1333 hours. Follow-up is recommended to document resolution. 2. Stable elevation of the right hemidiaphragm. 3. Stable cardiomegaly. XR CHEST PORTABLE    Result Date: 5/20/2022  Elevation of the right hemidiaphragm with right basilar airspace opacities. These opacities could represent atelectasis or in the appropriate clinical setting pneumonia. XR CHEST PORTABLE    Result Date: 5/17/2022  No acute cardiopulmonary findings. XR CERVICAL SPINE FLEXION AND EXTENSION    Result Date: 5/17/2022  Advanced multilevel spondylosis without any findings of dynamic instability. Overall limited range of motion. MRI BRAIN WO CONTRAST    Result Date: 5/17/2022  1. No acute infarct or acute intracranial process identified. 2. Diffuse cerebral volume loss and moderate chronic small vessel ischemic changes. FL MODIFIED BARIUM SWALLOW W VIDEO    Result Date: 5/17/2022  1. No aspiration. 2. Deep laryngeal penetration of nectar thick liquid and thin liquid. Please see separate speech pathology report for full discussion of findings and recommendations. ASSESSMENT & PLAN     Principal Problem:    Weakness  Active Problems:    Parkinson's disease ([address]    COVID-19    Cervical myelopathy (HCC)    Iron deficiency anemia    Thrombocytopenia (HCC)    CRP elevated  Resolved Problems:    Syncope and collapse    Hypotension    KALYAN (acute kidney injury) (HCC)    Orthostatic hypertension    1. Syncope and collapse.  Multifactorial.  Likely 2/2 orthostatic hypotension 2/2 dysautonomia due to Parkinson's disease/dehydration vs cervical myelopathy vs cardiogenic vs COVID infection.  MRI cervical spine shows cervical canal narrowing with myelopathy. Decrease IVFs. Encourage oral intake (has poor appetite, refuses oral intake). Echo LVEF >55 %. Holter monitoring as OP. follow up with cardio. 2. Hypoglycemia secondary to decreased oral intake. D5 NS @75 ml/hr. encourage oral intake. Will talk to the family regarding further plan of care  3. Cervical myelopathy: Symptomatic. Neurosurgery evaluated. OP surgery follow up. 4. Parkinson's disease.  Continue on Sinemet 1 tablet 3 times daily. 5. COVID-19 infection, saturating on room air.  Airborne precautions. S/p  Remdesivir for 5 days. Discharge with Paxlovid 5 days and Doxy for IV site erythema. 6. KALYAN.  Resolved  7. Orthostatic hypotension: Resolved. Encourage oral intake, DARVIN compressions, PT, OT.  Fall precautions.    8. Chronic alcoholism.  Multivitamin and folate supplementation.     DVT ppx : lovenox  GI ppx: not needed  PT/OT: Following  Discharge Planning / [place] today    Violette Chandler MD  Internal Medicine Resident, PGY-1  [address], [place]  5/23/2022, 7:09 AM   Attending Physician Statement  I have discussed the care of Rachael Wren, including pertinent history and exam findings,  with the resident.  I have seen and examined the patient and the key elements of all parts of the encounter have been performed by me. I agree with the assessment, plan and orders as documented by the resident with additions . Treatment plan Discussed with nursing staff in detail , all questions answered . Electronically signed by Sophie Dailey MD on   5/23/22 at 9:08 PM EDT    Please note that this chart was generated using voice recognition Dragon dictation software. Although every effort was made to ensure the accuracy of this automated transcription, some errors in transcription may have occurred.

## 2022-05-23 NOTE — PLAN OF CARE
Problem: Discharge Planning  Goal: Discharge to home or other facility with appropriate resources  5/23/2022 1006 by Constanza Henry RN  Outcome: Progressing  5/23/2022 1005 by Constanza Henry RN  Outcome: Progressing  5/22/2022 2147 by Jacquie Holland RN  Outcome: Progressing  Flowsheets (Taken 5/22/2022 2000)  Discharge to home or other facility with appropriate resources:   Identify barriers to discharge with patient and caregiver   Arrange for needed discharge resources and transportation as appropriate     Problem: Skin/Tissue Integrity  Goal: Absence of new skin breakdown  Description: 1. Monitor for areas of redness and/or skin breakdown  2. Assess vascular access sites hourly  3. Every 4-6 hours minimum:  Change oxygen saturation probe site  4. Every 4-6 hours:  If on nasal continuous positive airway pressure, respiratory therapy assess nares and determine need for appliance change or resting period. 5/23/2022 1006 by Constanza Henry RN  Outcome: Progressing  5/23/2022 1005 by Constanza Henry RN  Outcome: Progressing  5/22/2022 2147 by Jacquie Holland RN  Outcome: Progressing     Problem: Safety - Adult  Goal: Free from fall injury  5/23/2022 1006 by Constanza Henry RN  Outcome: Progressing  5/23/2022 1005 by Constanza Henry RN  Outcome: Progressing  5/22/2022 2147 by Jacquie Holland RN  Outcome: Progressing  Flowsheets (Taken 5/22/2022 2000)  Free From Fall Injury:   Instruct family/caregiver on patient safety   Based on caregiver fall risk screen, instruct family/caregiver to ask for assistance with transferring infant if caregiver noted to have fall risk factors     Problem: Confusion  Goal: Confusion, delirium, dementia, or psychosis is improved or at baseline  Description: INTERVENTIONS:  1. Assess for possible contributors to thought disturbance, including medications, impaired vision or hearing, underlying metabolic abnormalities, dehydration, psychiatric diagnoses, and notify attending LIP  2. [place] high risk fall precautions, as indicated  3. Provide frequent short contacts to provide reality reorientation, refocusing and direction  4. Decrease environmental stimuli, including noise as appropriate  5. Monitor and intervene to maintain adequate nutrition, hydration, elimination, sleep and activity  6. If unable to ensure safety without constant attention obtain sitter and review sitter guidelines with assigned personnel  7.  Initiate Psychosocial CNS and Spiritual Care consult, as indicated  5/23/2022 1006 by Dean Almazan, RN  Outcome: Progressing  5/23/2022 1005 by Dean Almazan RN  Outcome: Progressing  5/22/2022 2147 by Bi Gunderson RN  Outcome: Progressing  Flowsheets (Taken 5/22/2022 2000)  Effect of thought disturbance (confusion, delirium, dementia, or psychosis) are managed with adequate functional status:   Assess for contributors to thought disturbance, including medications, impaired vision or hearing, underlying metabolic abnormalities, dehydration, psychiatric diagnoses, notify [facility] high risk fall precautions, as indicated   Decrease environmental stimuli, including noise as appropriate     Problem: Pain  Goal: Verbalizes/displays adequate comfort level or baseline comfort level  5/23/2022 1006 by Dean Almazan, RN  Outcome: Progressing  5/23/2022 1005 by Dean Almazan RN  Outcome: Progressing  5/22/2022 2147 by Bi Gunderson RN  Outcome: Progressing

## 2022-05-24 ENCOUNTER — APPOINTMENT (OUTPATIENT)
Dept: GENERAL RADIOLOGY | Age: 77
DRG: 056 | End: 2022-05-24
Payer: OTHER GOVERNMENT

## 2022-05-24 LAB
ABSOLUTE EOS #: 0.1 K/UL (ref 0–0.44)
ABSOLUTE IMMATURE GRANULOCYTE: 0.1 K/UL (ref 0–0.3)
ABSOLUTE LYMPH #: 0.61 K/UL (ref 1.1–3.7)
ABSOLUTE MONO #: 0.71 K/UL (ref 0.1–1.2)
ANION GAP SERPL CALCULATED.3IONS-SCNC: 9 MMOL/L (ref 9–17)
BASOPHILS # BLD: 0 % (ref 0–2)
BASOPHILS ABSOLUTE: 0 K/UL (ref 0–0.2)
BUN BLDV-MCNC: 10 MG/DL (ref 8–23)
C-REACTIVE PROTEIN: 27.9 MG/L (ref 0–5)
CALCIUM SERPL-MCNC: 7 MG/DL (ref 8.6–10.4)
CHLORIDE BLD-SCNC: 112 MMOL/L (ref 98–107)
CO2: 22 MMOL/L (ref 20–31)
CREAT SERPL-MCNC: 0.82 MG/DL (ref 0.7–1.2)
EOSINOPHILS RELATIVE PERCENT: 1 % (ref 1–4)
GFR AFRICAN AMERICAN: >60 ML/MIN
GFR NON-AFRICAN AMERICAN: >60 ML/MIN
GFR SERPL CREATININE-BSD FRML MDRD: ABNORMAL ML/MIN/{1.73_M2}
GLUCOSE BLD-MCNC: 126 MG/DL (ref 70–99)
GLUCOSE BLD-MCNC: 127 MG/DL (ref 75–110)
GLUCOSE BLD-MCNC: 97 MG/DL (ref 75–110)
HCT VFR BLD CALC: 30.6 % (ref 40.7–50.3)
HEMOGLOBIN: 10.2 G/DL (ref 13–17)
IMMATURE GRANULOCYTES: 1 %
INTERVENTION: NORMAL
LYMPHOCYTES # BLD: 6 % (ref 24–43)
MCH RBC QN AUTO: 30.1 PG (ref 25.2–33.5)
MCHC RBC AUTO-ENTMCNC: 33.3 G/DL (ref 28.4–34.8)
MCV RBC AUTO: 90.3 FL (ref 82.6–102.9)
MONOCYTES # BLD: 7 % (ref 3–12)
MORPHOLOGY: ABNORMAL
MORPHOLOGY: ABNORMAL
NRBC AUTOMATED: 0 PER 100 WBC
PDW BLD-RTO: 14.5 % (ref 11.8–14.4)
PLATELET # BLD: 156 K/UL (ref 138–453)
PMV BLD AUTO: 11.8 FL (ref 8.1–13.5)
POTASSIUM SERPL-SCNC: 3.6 MMOL/L (ref 3.7–5.3)
RBC # BLD: 3.39 M/UL (ref 4.21–5.77)
SEG NEUTROPHILS: 85 % (ref 36–65)
SEGMENTED NEUTROPHILS ABSOLUTE COUNT: 8.68 K/UL (ref 1.5–8.1)
SODIUM BLD-SCNC: 143 MMOL/L (ref 135–144)
WBC # BLD: 10.2 K/UL (ref 3.5–11.3)

## 2022-05-24 PROCEDURE — 80048 BASIC METABOLIC PNL TOTAL CA: CPT

## 2022-05-24 PROCEDURE — 86140 C-REACTIVE PROTEIN: CPT

## 2022-05-24 PROCEDURE — 82947 ASSAY GLUCOSE BLOOD QUANT: CPT

## 2022-05-24 PROCEDURE — 97535 SELF CARE MNGMENT TRAINING: CPT

## 2022-05-24 PROCEDURE — 71045 X-RAY EXAM CHEST 1 VIEW: CPT

## 2022-05-24 PROCEDURE — 92611 MOTION FLUOROSCOPY/SWALLOW: CPT

## 2022-05-24 PROCEDURE — 36415 COLL VENOUS BLD VENIPUNCTURE: CPT

## 2022-05-24 PROCEDURE — 99232 SBSQ HOSP IP/OBS MODERATE 35: CPT | Performed by: INTERNAL MEDICINE

## 2022-05-24 PROCEDURE — 99233 SBSQ HOSP IP/OBS HIGH 50: CPT | Performed by: INTERNAL MEDICINE

## 2022-05-24 PROCEDURE — 2060000000 HC ICU INTERMEDIATE R&B

## 2022-05-24 PROCEDURE — 6360000002 HC RX W HCPCS: Performed by: STUDENT IN AN ORGANIZED HEALTH CARE EDUCATION/TRAINING PROGRAM

## 2022-05-24 PROCEDURE — 74230 X-RAY XM SWLNG FUNCJ C+: CPT

## 2022-05-24 PROCEDURE — 6360000002 HC RX W HCPCS

## 2022-05-24 PROCEDURE — 2580000003 HC RX 258

## 2022-05-24 PROCEDURE — 85025 COMPLETE CBC W/AUTO DIFF WBC: CPT

## 2022-05-24 RX ORDER — HYDRALAZINE HYDROCHLORIDE 20 MG/ML
5 INJECTION INTRAMUSCULAR; INTRAVENOUS ONCE
Status: COMPLETED | OUTPATIENT
Start: 2022-05-24 | End: 2022-05-24

## 2022-05-24 RX ORDER — AMLODIPINE BESYLATE 5 MG/1
5 TABLET ORAL DAILY
Qty: 30 TABLET | Refills: 2 | DISCHARGE
Start: 2022-05-24 | End: 2022-05-26 | Stop reason: HOSPADM

## 2022-05-24 RX ORDER — DEXTROSE AND SODIUM CHLORIDE 5; .9 G/100ML; G/100ML
INJECTION, SOLUTION INTRAVENOUS CONTINUOUS
Status: DISCONTINUED | OUTPATIENT
Start: 2022-05-24 | End: 2022-05-27 | Stop reason: HOSPADM

## 2022-05-24 RX ORDER — AMLODIPINE BESYLATE 5 MG/1
5 TABLET ORAL DAILY
Status: DISCONTINUED | OUTPATIENT
Start: 2022-05-24 | End: 2022-05-25

## 2022-05-24 RX ADMIN — ENOXAPARIN SODIUM 40 MG: 100 INJECTION SUBCUTANEOUS at 08:19

## 2022-05-24 RX ADMIN — DEXTROSE AND SODIUM CHLORIDE: 5; 900 INJECTION, SOLUTION INTRAVENOUS at 21:26

## 2022-05-24 RX ADMIN — DEXTROSE AND SODIUM CHLORIDE: 5; 900 INJECTION, SOLUTION INTRAVENOUS at 08:21

## 2022-05-24 RX ADMIN — HYDRALAZINE HYDROCHLORIDE 5 MG: 20 INJECTION INTRAMUSCULAR; INTRAVENOUS at 21:20

## 2022-05-24 ASSESSMENT — ENCOUNTER SYMPTOMS
EYE DISCHARGE: 0
COLOR CHANGE: 0
COUGH: 0
APNEA: 0
ABDOMINAL DISTENTION: 0
PHOTOPHOBIA: 0

## 2022-05-24 NOTE — PROGRESS NOTES
Get CRP  looking for new inflammation      Summary of relevant labs:  Labs:  CRP 12 - 10 - 28 - 31  Creat 1.27  WBC 4.4 - 4.8    Micro:  BC 5/17  U cx 5/17    Imaging:  CXR 5/20  Elevation of the right hemidiaphragm with right basilar airspace opacities. Stable cardiomediastinal silhouette. Left lung is clear. No pneumothorax. No definite pleural effusion       CT chest and AP  neg for pneumonia   Upper Abdomen: Cholelithiasis and free fluid within peritoneal cavity which may be secondary to ascites. I have personally reviewed the past medical history, past surgical history, medications, social history, and family history, and I haveupdated the database accordingly. Allergies:   Patient has no known allergies. Review of Systems:     Review of Systems   Constitutional: Positive for activity change. Negative for fatigue and fever. HENT: Negative for congestion. Eyes: Negative for photophobia and discharge. Respiratory: Negative for apnea and cough. Cardiovascular: Negative for chest pain. Gastrointestinal: Negative for abdominal distention. Endocrine: Negative for heat intolerance and polyphagia. Genitourinary: Negative for dysuria. Musculoskeletal: Negative for arthralgias. Skin: Negative for color change. Allergic/Immunologic: Negative for immunocompromised state. Neurological: Negative for dizziness, tremors, seizures, syncope, light-headedness, numbness and headaches. Hematological: Negative for adenopathy. Psychiatric/Behavioral: Negative for agitation. Physical Examination :       Physical Exam  Constitutional:       General: He is not in acute distress. Appearance: Normal appearance. He is not ill-appearing, toxic-appearing or diaphoretic. HENT:      Head: Normocephalic and atraumatic. Nose: Nose normal.      Mouth/Throat:      Mouth: Mucous membranes are moist.   Eyes:      General: No scleral icterus.      Conjunctiva/sclera: Conjunctivae normal.      Pupils: Pupils are equal, round, and reactive to light. Cardiovascular:      Rate and Rhythm: Normal rate and regular rhythm. Heart sounds: Normal heart sounds. No murmur heard. Pulmonary:      Effort: No respiratory distress. Breath sounds: Normal breath sounds. No stridor. Abdominal:      General: There is no distension. Palpations: Abdomen is soft. There is no mass. Genitourinary:     Comments: No broussard  Musculoskeletal:         General: No swelling, tenderness, deformity or signs of injury. Cervical back: Neck supple. No rigidity or tenderness. Right lower leg: No edema. Left lower leg: No edema. Skin:     General: Skin is dry. Coloration: Skin is not jaundiced. Neurological:      General: No focal deficit present. Mental Status: He is alert and oriented to person, place, and time. Psychiatric:         Mood and Affect: Mood normal.         Thought Content: Thought content normal.         Past Medical History:   No past medical history on file. Past Surgical  History:   No past surgical history on file.     Medications:      amLODIPine  5 mg Oral Daily    ferrous sulfate  325 mg Oral Daily with breakfast    enoxaparin  40 mg SubCUTAneous Daily    vitamin D  50,000 Units Oral Weekly    carbidopa-levodopa  1 tablet Oral TID    sodium chloride flush  5-40 mL IntraVENous 2 times per day       Social History:     Social History     Socioeconomic History    Marital status: Single     Spouse name: Not on file    Number of children: Not on file    Years of education: Not on file    Highest education level: Not on file   Occupational History    Not on file   Tobacco Use    Smoking status: Not on file    Smokeless tobacco: Not on file   Substance and Sexual Activity    Alcohol use: Not on file    Drug use: Not on file    Sexual activity: Not on file   Other Topics Concern    Not on file   Social History Narrative    Not on file Social Determinants of Health     Financial Resource Strain:     Difficulty of Paying Living Expenses: Not on file   Food Insecurity:     Worried About Running Out of Food in the Last Year: Not on file    Ashley of Food in the Last Year: Not on file   Transportation Needs:     Lack of Transportation (Medical): Not on file    Lack of Transportation (Non-Medical): Not on file   Physical Activity:     Days of Exercise per Week: Not on file    Minutes of Exercise per Session: Not on file   Stress:     Feeling of Stress : Not on file   Social Connections:     Frequency of Communication with Friends and Family: Not on file    Frequency of Social Gatherings with Friends and Family: Not on file    Attends Restorationist Services: Not on file    Active Member of [address] [place] [address] Digital Message Display or Organizations: Not on file    Attends Club or Organization Meetings: Not on file    Marital Status: Not on file   Intimate Partner Violence:     Fear of Current or Ex-Partner: Not on file    Emotionally Abused: Not on file    Physically Abused: Not on file    Sexually Abused: Not on file   Housing Stability:     Unable to Pay for Housing in the Last Year: Not on file    Number of [place] in the Last Year: Not on file    Unstable Housing in the Last Year: Not on file       Family History:   No family history on file. Medical Decision Making:   I have independently reviewed/ordered the following labs:    CBC with Differential:   Recent Labs     05/23/22  0709 05/24/22  0554   WBC 6.7 10.2   HGB 10.2* 10.2*   HCT 29.9* 30.6*   PLT See Reflexed IPF Result 156   LYMPHOPCT 14* 6*   MONOPCT 5 7     BMP:  Recent Labs     05/23/22  0709 05/24/22  0554   * 143   K 3.5* 3.6*    112*   CO2 21 22   BUN 13 10   CREATININE 0.77 0.82   MG 1.6  --      Hepatic Function Panel:   No results for input(s): PROT, LABALBU, BILIDIR, IBILI, BILITOT, ALKPHOS, ALT, AST in the last 72 hours. No results for input(s): RPR in the last 72 hours.   No results for input(s): HIV in the last 72 hours. No results for input(s): BC in the last 72 hours. Lab Results   Component Value Date    CREATININE 0.82 05/24/2022    GLUCOSE 126 05/24/2022       Detailed results: Thank you for allowing us to participate in the care of this patient. Please call with questions. This note is created with the assistance of a speech recognition program.  While intending to generate adocument that actually reflects the content of the visit, the document can still have some errors including those of syntax and sound a like substitutions which may escape proof reading. It such instances, actual meaningcan be extrapolated by contextual diversion.     Piter Le MD  Office: [phone]  Perfect serve / office [phone]

## 2022-05-24 NOTE — PROGRESS NOTES
[facility] -   Infectious diseases evaluation  admission date 5/15/2022    reason for consultation:   covid     Impression :   Current:  · Syncope  · COVID, mild illness  · Parkinson  · KALYAN   · CRP elevation    Other:  ·   Discussion / summary of stay / plan of care   ·   Recommendations   · remdesevir 5 days 5/17 -5/21 -  · Pull left iv  - site red - start doxy - stop 5/23  · spirometer to use fr atelectasis  · Ok for DC off AB   reconsiled  Infection Control Recommendations   · Universal Precautions  · Droplet plus Isolation      Antimicrobial Stewardship Recommendations   · Simplification of therapy  · Targeted therapy    History of Present Illness:   Initial history:  Gracy Brothers is a 77y.o.-year-old male who presented to the hospital 5/15 with a syncopal episode, while he was sitting in his wheelchair. No seizure activity  Was having some cough and  COVID-positive tested before he came in. CXR neg  CT chest neg  - CT AP L3 vertebral body multiple round lesions.     pt does have some underlying and uses a wheelchair  Past etoh++    Pt is on RAand very poor historian    Interval changes  5/23/2022   Patient Vitals for the past 8 hrs:   BP Temp Temp src Pulse Resp SpO2   05/23/22 1917 (!) 170/65 97.6 °F (36.4 °C) Oral 86 19 97 %   05/23/22 1755 (!) 156/67 98.1 °F (36.7 °C) Oral 74 21 --   05/23/22 1725 (!) 170/68 -- -- 84 22 100 %   congested and wet cough - no fever  repeat CXR w R atelectasis  And elevated R adrianne diaphragm    CRP up 31 and iv site is red  cough w some bronchospasm  Seems still congested    On remdesevir    5/23  cough and congestion are both better  Labs still benign  Ok for DC   Disc w medicine  Speech stays sluggish  Weak and cerv mylopathy -NS called -outpt FU      Summary of relevant labs:  Labs:  CRP 12 - 10 - 28 - 31  Creat 1.27  WBC 4.4 - 4.8    Micro:  BC 5/17  U cx 5/17    Imaging:  CXR 5/20  Elevation of the right hemidiaphragm with Breath sounds: Normal breath sounds. No stridor. Abdominal:      General: There is no distension. Palpations: Abdomen is soft. There is no mass. Genitourinary:     Comments: No broussard  Musculoskeletal:         General: No swelling, tenderness, deformity or signs of injury. Cervical back: Neck supple. No rigidity or tenderness. Skin:     General: Skin is dry. Coloration: Skin is not jaundiced. Neurological:      General: No focal deficit present. Mental Status: He is alert and oriented to person, place, and time. Psychiatric:         Mood and Affect: Mood normal.         Thought Content: Thought content normal.         Past Medical History:   No past medical history on file. Past Surgical  History:   No past surgical history on file.     Medications:      ferrous sulfate  325 mg Oral Daily with breakfast    doxycycline hyclate  100 mg Oral 2 times per day    enoxaparin  40 mg SubCUTAneous Daily    vitamin D  50,000 Units Oral Weekly    carbidopa-levodopa  1 tablet Oral TID    sodium chloride flush  5-40 mL IntraVENous 2 times per day       Social History:     Social History     Socioeconomic History    Marital status: Single     Spouse name: Not on file    Number of children: Not on file    Years of education: Not on file    Highest education level: Not on file   Occupational History    Not on file   Tobacco Use    Smoking status: Not on file    Smokeless tobacco: Not on file   Substance and Sexual Activity    Alcohol use: Not on file    Drug use: Not on file    Sexual activity: Not on file   Other Topics Concern    Not on file   Social History Narrative    Not on file     Social Determinants of Health     Financial Resource Strain:     Difficulty of Paying Living Expenses: Not on file   Food Insecurity:     Worried About Running Out of Food in the Last Year: Not on file    Ashley of Food in the Last Year: Not on file   Transportation Needs:     Lack of Transportation (Medical): Not on file    Lack of Transportation (Non-Medical): Not on file   Physical Activity:     Days of Exercise per Week: Not on file    Minutes of Exercise per Session: Not on file   Stress:     Feeling of Stress : Not on file   Social Connections:     Frequency of Communication with Friends and Family: Not on file    Frequency of Social Gatherings with Friends and Family: Not on file    Attends Restorationist Services: Not on file    Active Member of [address] [place] [address] [employer] or Organizations: Not on file    Attends Club or Organization Meetings: Not on file    Marital Status: Not on file   Intimate Partner Violence:     Fear of Current or Ex-Partner: Not on file    Emotionally Abused: Not on file    Physically Abused: Not on file    Sexually Abused: Not on file   Housing Stability:     Unable to Pay for Housing in the Last Year: Not on file    Number of [place] in the Last Year: Not on file    Unstable Housing in the Last Year: Not on file       Family History:   No family history on file. Medical Decision Making:   I have independently reviewed/ordered the following labs:    CBC with Differential:   Recent Labs     05/22/22  0512 05/23/22  0709   WBC 6.0 6.7   HGB 9.2* 10.2*   HCT 27.4* 29.9*   * See Reflexed IPF Result   LYMPHOPCT 17* 14*   MONOPCT 8 5     BMP:  Recent Labs     05/21/22  0556 05/21/22  0556 05/22/22  0512 05/23/22  0709      < > 137 134*   K 3.5*   < > 3.7 3.5*   *   < > 109* 107   CO2 19*   < > 19* 21   BUN 18   < > 15 13   CREATININE 0.88   < > 0.76 0.77   MG 1.8  --   --  1.6    < > = values in this interval not displayed. Hepatic Function Panel:   No results for input(s): PROT, LABALBU, BILIDIR, IBILI, BILITOT, ALKPHOS, ALT, AST in the last 72 hours. No results for input(s): RPR in the last 72 hours. No results for input(s): HIV in the last 72 hours. No results for input(s): BC in the last 72 hours.   Lab Results   Component Value Date CREATININE 0.77 05/23/2022    GLUCOSE 78 05/23/2022       Detailed results: Thank you for allowing us to participate in the care of this patient. Please call with questions. This note is created with the assistance of a speech recognition program.  While intending to generate adocument that actually reflects the content of the visit, the document can still have some errors including those of syntax and sound a like substitutions which may escape proof reading. It such instances, actual meaningcan be extrapolated by contextual diversion.     Claudette Kell, MD  Office: [phone]  Perfect serve / office [phone]

## 2022-05-24 NOTE — PROGRESS NOTES
Occupational Therapy  Facility/Department: [address] STEPDOWN  Occupational Therapy Daily Progress Note    Name: Leodan Franz  : 1945  MRN: [id number]  Date of Service: 2022    Discharge Recommendations:Pt. Would benefit from further skilled OT services to enhance functional outcomes. Patient would benefit from continued therapy after discharge          Patient Diagnosis(es): The primary encounter diagnosis was Syncope and collapse. A diagnosis of Thrombocytopenia (HCC) was also pertinent to this visit. Past Medical History:  has no past medical history on file. Past Surgical History:  has no past surgical history on file. Treatment Diagnosis: syncope      Assessment   Performance deficits / Impairments: Decreased functional mobility ; Decreased ADL status; Decreased endurance;Decreased high-level IADLs;Decreased cognition;Decreased balance;Decreased strength;Decreased fine motor control  Assessment: pt would be unsafe to return to prior living arrangement at this time d/t increased need for A for functional transfers and ADL completion on this date.  pt would benefit from further therapy at discharge in order to increase ability to completeADLS and functional transfers/mobility  Treatment Diagnosis: syncope  Prognosis: Fair  Decision Making: High Complexity  REQUIRES OT FOLLOW-UP: Yes        Pain Assessment  Pain Assessment: 0-10  Pain Level: No c/o pain    Plan   Plan  Times per Week: 3-4x/wk  Current Treatment Recommendations: Strengthening,Balance training,Endurance training,Neuromuscular re-education,Self-Care / ADL,Patient/Caregiver education & training     Restrictions  Restrictions/Precautions  Restrictions/Precautions: Fall Risk,Up as Tolerated  Required Braces or Orthoses?: No  Position Activity Restriction  Other position/activity restrictions: hx of Parkinson's, COVID+    Subjective   General  Patient assessed for rehabilitation services?: Yes  Family / Caregiver Present: No  Diagnosis: syncope  General Comment  Comments: RN ok'd for therapy this morning. pt agreeable to participate in session and cooperative/pleasant throughout, although lethargic. pt denied pain throughout session       Objective              Safety Devices  Type of Devices: Call light within reach;Gait belt;Nurse notified; Heels elevated for pressure relief; Chair alarm in place; Left in chair  Restraints  Restraints Initially in Place: No  Bed Mobility Training  Bed Mobility Training: Yes  Overall Level of Assistance: Minimum assistance  Supine to Sit: Minimum assistance (Min A for trunk/B LE progression)  Scooting: Contact-guard assistance  Balance  Sitting: With support (Sitting EOB/recliner chair, static/dynamic, ~30 minutes, CGA-SBA to ensure stability d/t L lateral lean)  Standing: With support (Min A x1 + RW, mild posterior Lean, cues to keep upright posture, ~5 minutes with 2 sitting rest breaks. SOB with all movemnt, needing cues for pursed lip breathing, needing extended rest breaks between all transitions)  Transfer Training  Transfer Training: Yes  Overall Level of Assistance: Additional time;Minimum assistance (Min A d/t posterior lean/LOB, Mod verbal cues to keep upright posture.)  Interventions: Safety awareness training;Verbal cues  Sit to Stand: Additional time;Minimum assistance;Assist X1  Stand to Sit: Assist X1;Minimum assistance; Additional time  Bed to Chair: Additional time;Assist X1;Minimum assistance        ADL  Grooming: Verbal cueing;Minimal assistance  Grooming Skilled Clinical Factors: Sitting EOB, to comb hair/wash face, min A d/t decreased B UE ROM. Pt. needing cues to initiate, increased time and effort. UE Dressing: Moderate assistance  UE Dressing Skilled Clinical Factors: Mod A to doff/don gown d/t weakness and decreased B UE ROM, Sitting EOB. Additional Comments: Pt. alexx PLASENCIA tolerance to ADL session, needing extended time and effort with min-mod cues for initiation to task d/t weakness and pt. self limiting. Cognition  Overall Cognitive Status: Exceptions  Arousal/Alertness: Delayed responses to stimuli  Following Commands: Follows multistep commands with increased time; Follows multistep commands with repitition  Attention Span: Attends with cues to redirect  Memory: Decreased recall of recent events  Safety Judgement: Decreased awareness of need for assistance;Decreased awareness of need for safety  Problem Solving: Assistance required to generate solutions;Assistance required to correct errors made  Insights: Decreased awareness of deficits  Initiation: Requires cues for some  Sequencing: Requires cues for some                  Education Given To: Patient  Education Provided: Role of Therapy;Plan of Care  Education Provided Comments: safety during functional transfers/functional mobility, Benefits of OOB activity, 4 figure tech, importance of atempting task to enhance I, benefits of therapy, Pt. demo F carryover.   Education Method: Verbal;Demonstration  Barriers to Learning: None  Education Outcome: Continued education needed;Verbalized understanding;Demonstrated understanding                                         AM-PAC Score        AM-PAC Inpatient Daily Activity Raw Score: 15 (05/24/22 0937)  AM-PAC Inpatient ADL T-Scale Score : 34.69 (05/24/22 5297)  ADL Inpatient CMS 0-100% Score: 56.46 (05/24/22 0937)  ADL Inpatient CMS G-Code Modifier : CK (05/24/22 0937)    Goals  Short Term Goals  Time Frame for Short term goals: pt will, by discharge  Short Term Goal 1: complete LB ADLs and toileting tasks with mod A, set up and aE, as needed  Short Term Goal 2: complete UB ADLs and grooming tasks with SBA And set up  Short Term Goal 3: increase activity tolerance to 15+ minutes in order to participate in daily tasks  Short Term Goal 4: maintain attention to task for ~4 minutes with 1-2 verbal cues for redirection  Short Term Goal 5: follow 90% of simple commands with 1-2 verbal cues for initiation and sequencing  Short Term Goal 6: dem mod A during functional transfers with LRD ,as needed  Long Term Goals  Time Frame for Long term goals : NOTIFY OTR TO UPDATE GOALS AS APPROPRIATE       Therapy Time   Individual Concurrent Group Co-treatment   Time In 0840         Time Out 0925         Minutes 45         Timed Code Treatment Minutes: [address], MAHER/L

## 2022-05-24 NOTE — PROGRESS NOTES
Provider notified via [email or web address] dr Elsa Almanza  5/23/22 1:52 PM  [phone] From: Linda Siegel [facility] RE: Adan Mention pt was eating lunch with ancillary staff. Does not sound like he is clearing his food. Possibly sounds like he needs speech reeval or barium swallow study. when I listened to him. ..possible aspiration. food and water taken away at this point awaiting further instructions  Read 1:53 PM     5/23/22 3:29 PM  speech would like an order for modified barium swallow study            Provider Dr Elsa Almanza notified via [email or web address] 5/24/22 818    per assessment this AM, I do not feel safe giving PO meds. Pt is anable to speak in a \clear tone. Unable to to cough, hardly able to maintain wakeful state.  BP is 173/80 HR 95     Response 0818    Ok thanks for the update

## 2022-05-24 NOTE — PROGRESS NOTES
[facility]  Internal Medicine Teaching Residency Program  Inpatient Daily Progress Note  ______________________________________________________________________________    Patient: Germán Stephenson  YOB: 1945   WBZ:[id number]    Acct: [de-identified]     Room: [facility] access hospital3026-86  Admit date: 5/15/2022  Today's date: 05/24/22  Number of days in the hospital: 9    SUBJECTIVE   Admitting Diagnosis: Weakness  CC: Syncopal episode  Pt examined at bedside. Chart & results reviewed. Concern for aspiration, unable to clear airway, weak cough  Able to respond but weak  On supplemental O2 via NC 4 L, afebrile  Hypertensive overnight  Chest x-ray, RT eval and treat  Modified barium swallow study    ROS:  Constitutional:  negative for chills, fevers, sweats  Respiratory: Positive for cough,  shortness of breath, rales  Cardiovascular:  negative for chest pain, chest pressure/discomfort, lower extremity edema, palpitations  Gastrointestinal:  negative for abdominal pain, constipation, diarrhea, nausea, vomiting  Neurological:  negative for dizziness, headache  BRIEF HISTORY     The patient is a pleasant 76 y. o. male with past medical history signifiacnt for parkinsonism disease, hypertension and hyperlipidemia presents through EMS with chief complaint of syncope that occurred just prior to arrival.  History was mostly provided by the caretaker the ER resident.      Patient is admitted for syncopal episode work-up.  Likely multifactorial due to dysautonomia, orthostatic hypotension, neurocardiogenic, neurogenic due to cervical spinal stenosis and canal narrowing.  Cardiology consulted for ruling out cardiogenic causes.  Plan for echo.     Dehydration secondary to decreased oral intake causing KALYAN, low blood pressure, orthostatic hypotension.   COVID-positive.  ID following  Neurology: Signed off  Echo LVEF > 55%  Holter monitoring on discharge per cardio  F/u with NSG in 4 weeks. Concern for aspiration. Continue on supplemental O2, CXR, modified barium swallow study. OBJECTIVE     Vital Signs:  BP (!) 165/53   Pulse 90   Temp 98 °F (36.7 °C) (Axillary)   Resp 21   Ht 5' 9.02\" (1.753 m)   Wt 141 lb (64 kg)   SpO2 99%   BMI 20.82 kg/m²     Temp (24hrs), Av.9 °F (36.6 °C), Min:97.6 °F (36.4 °C), Max:98.1 °F (36.7 °C)    In: 2420.1   Out: 510 [Urine:510]    Physical Exam:  Constitutional: This is a well developed, well nourished, 18.5-24.9 - Normal 77y.o. year old male who is chronically ill, weak, unable to clear his airway and in no apparent distress. Head:normocephalic and atraumatic. EENT:  PERRLA. No conjunctival injections. Septum was midline, mucosa was without erythema, exudates or cobblestoning. No thrush was noted. Neck: Supple without thyromegaly. No elevated JVP. Trachea was midline. Respiratory: Chest was symmetrical without dullness to percussion. Bilateral decreased air entry with crackles on the bases more prominent on the right side. There is no intercostal retraction or use of accessory muscles. No egophony noted. Cardiovascular: Regular without murmur, clicks, gallops or rubs. Abdomen: Slightly rounded and soft without organomegaly. No rebound, rigidity or guarding was appreciated. Lymphatic: No lymphadenopathy. Musculoskeletal: Normal curvature of the spine. No gross muscle weakness. Extremities:  No lower extremity edema, ulcerations, tenderness, varicosities or erythema. Muscle size, tone and strength are normal.  No involuntary movements are noted. Skin:  Warm and dry. Good color, turgor and pigmentation. No lesions or scars.   No cyanosis or clubbing  Neurological/Psychiatric: The patient's general behavior, level of consciousness, thought content and emotional status is normal.        Medications:  Scheduled Medications:    ferrous sulfate  325 mg Oral Daily with breakfast    enoxaparin  40 mg SubCUTAneous Daily    vitamin D  50,000 Units Oral Weekly    carbidopa-levodopa  1 tablet Oral TID    sodium chloride flush  5-40 mL IntraVENous 2 times per day     Continuous Infusions:    dextrose 5 % and 0.9 % NaCl 100 mL/hr at 05/23/22 2331    dextrose 100 mL/hr (05/19/22 0340)    sodium chloride       PRN Medicationssennosides-docusate sodium, 2 tablet, Daily PRN  sodium chloride, 30 mL, PRN  glucose, 4 tablet, PRN  dextrose bolus, 125 mL, PRN   Or  dextrose bolus, 250 mL, PRN  glucagon (rDNA), 1 mg, PRN  dextrose, 100 mL/hr, PRN  sodium chloride flush, 10 mL, PRN  sodium chloride flush, 5-40 mL, PRN  sodium chloride, , PRN  ondansetron, 4 mg, Q8H PRN   Or  ondansetron, 4 mg, Q6H PRN  polyethylene glycol, 17 g, Daily PRN  acetaminophen, 650 mg, Q6H PRN   Or  acetaminophen, 650 mg, Q6H PRN        Diagnostic Labs:  CBC:   Recent Labs     05/21/22  0556 05/22/22  0512 05/23/22  0709   WBC 4.6 6.0 6.7   RBC 3.05* 3.03* 3.32*   HGB 9.2* 9.2* 10.2*   HCT 27.3* 27.4* 29.9*   MCV 89.5 90.4 90.1   RDW 14.1 14.3 14.2   PLT See Reflexed IPF Result 102* See Reflexed IPF Result     BMP:   Recent Labs     05/21/22  0556 05/22/22  0512 05/23/22  0709    137 134*   K 3.5* 3.7 3.5*   * 109* 107   CO2 19* 19* 21   BUN 18 15 13   CREATININE 0.88 0.76 0.77     BNP: No results for input(s): BNP in the last 72 hours. PT/INR: No results for input(s): PROTIME, INR in the last 72 hours. APTT: No results for input(s): APTT in the last 72 hours. CARDIAC ENZYMES: No results for input(s): CKMB, CKMBINDEX, TROPONINI in the last 72 hours. Invalid input(s): CKTOTAL;3  FASTING LIPID PANEL:No results found for: CHOL, HDL, TRIG  LIVER PROFILE: No results for input(s): AST, ALT, ALB, BILIDIR, BILITOT, ALKPHOS in the last 72 hours.    MICROBIOLOGY:   Lab Results   Component Value Date/Time    CULTURE NO GROWTH 5 DAYS 05/17/2022 09:05 AM       Imaging:    XR SHOULDER RIGHT (MIN 2 VIEWS)    Result Date: 5/19/2022  Moderate glenohumeral and moderate to severe acromioclavicular joint degenerative change without fracture. High riding humeral head indicative of chronic high-grade rotator cuff pathology. MRI CERVICAL SPINE WO CONTRAST    Result Date: 5/17/2022  1. Moderate spinal canal stenosis and severe bilateral neural foraminal narrowing at C4-C5 secondary to a posterior disc osteophyte complex, uncovertebral overgrowth and facet arthropathy. There is mild increased T2 signal intensity within the spinal cord at this level suggesting spondylotic myelopathy. 2. Moderate spinal canal stenosis and severe bilateral neural foraminal narrowing at C3-C4, as described above. 3. Mild spinal canal stenosis from C5-C6 to C7-T1. 4. Severe bilateral neural foraminal narrowing at C6-C7 and C7-T1, as described above. XR CHEST PORTABLE    Result Date: 5/21/2022  1. Stable mild bibasilar airspace disease and small bilateral pleural effusions. Overall, stable chest x-ray from 05/20/2022, 1333 hours. Follow-up is recommended to document resolution. 2. Stable elevation of the right hemidiaphragm. 3. Stable cardiomegaly. XR CHEST PORTABLE    Result Date: 5/20/2022  Elevation of the right hemidiaphragm with right basilar airspace opacities. These opacities could represent atelectasis or in the appropriate clinical setting pneumonia. XR CHEST PORTABLE    Result Date: 5/17/2022  No acute cardiopulmonary findings. XR CERVICAL SPINE FLEXION AND EXTENSION    Result Date: 5/17/2022  Advanced multilevel spondylosis without any findings of dynamic instability. Overall limited range of motion. MRI BRAIN WO CONTRAST    Result Date: 5/17/2022  1. No acute infarct or acute intracranial process identified. 2. Diffuse cerebral volume loss and moderate chronic small vessel ischemic changes. FL MODIFIED BARIUM SWALLOW W VIDEO    Result Date: 5/17/2022  1. No aspiration. 2. Deep laryngeal penetration of nectar thick liquid and thin liquid.  Please see separate speech pathology report for full discussion of findings and recommendations. ASSESSMENT & PLAN     Principal Problem (Resolved):    Weakness  Active Problems:    Parkinson's disease (HCC)    COVID-19    Cervical myelopathy (HCC)    Iron deficiency anemia    Thrombocytopenia (HCC)    Moderate protein-calorie malnutrition (HCC)  Resolved Problems:    Syncope and collapse    Hypotension    KALYAN (acute kidney injury) (HCC)    Orthostatic hypertension    CRP elevated    1. Syncope and collapse.  Multifactorial.  Likely 2/2 orthostatic hypotension 2/2 dysautonomia due to Parkinson's disease/dehydration vs cervical myelopathy vs cardiogenic vs COVID infection.  MRI cervical spine shows cervical canal narrowing with myelopathy. Decrease IVFs.  Encourage oral intake (has poor appetite, refuses oral intake). Echo LVEF >55 %. Holter monitoring as OP. follow up with cardio. 2. Hypoglycemia secondary to decreased oral intake. D5 NS @75 ml/hr. encourage oral intake. Will talk to the family regarding further plan of care  3. Concern for aspiration. Continue on supplemental O2 via NC 4 L. RT eval and treat. Modified barium swallow study. Consult palliative care  4. Cervical myelopathy: Symptomatic. Neurosurgery evaluated. OP surgery follow up. 5. Parkinson's disease.  Continue on Sinemet 1 tablet 3 times daily. 6. COVID-19 infection, saturating on room air.  Airborne precautions. S/p  Remdesivir for 5 days. Discharge with Paxlovid 5 days and Doxy for IV site erythema. 7. KALYAN.  Resolved  8. Orthostatic hypotension: Resolved. Encourage oral intake, DARVIN compressions, PT, OT.  Fall precautions.    9. Chronic alcoholism.  Multivitamin and folate supplementation.     DVT ppx : lovenox  GI ppx: not needed  PT/OT: Following  Discharge Planning / Danilo Arriola today    Shahla Waller MD  Internal Medicine Resident, PGY-1  [facility];  [place], [place]  5/24/2022, 5:26 AM   Attending Physician Statement  I have discussed the care of Nina Alvarez, including pertinent history and exam findings,  with the resident. I have seen and examined the patient and the key elements of all parts of the encounter have been performed by me. I agree with the assessment, plan and orders as documented by the resident with additions . Hypoxic respiratory failure secondary to COVID. Also parkinsonism. Malnutrition. There is a possible concern for pulmonary aspiration. There is no change in the right base. We will observe for fever or antibiotics at this time. Hypoxemia has improved. 0.15. .  Treatment plan Discussed with nursing staff in detail , all questions answered . Electronically signed by Pauline Horn MD on   5/24/22 at 10:53 AM EDT    Please note that this chart was generated using voice recognition Dragon dictation software. Although every effort was made to ensure the accuracy of this automated transcription, some errors in transcription may have occurred.

## 2022-05-24 NOTE — PROCEDURES
INSTRUMENTAL SWALLOW REPORT  MODIFIED BARIUM SWALLOW    NAME: Carol Mendoza   : 1945  MRN: [id number]       Date of Eval: 2022              Referring Diagnosis(es):      Past Medical History:  has no past medical history on file. Past Surgical History:  has no past surgical history on file. Type of Study: Repeat MBS         Patient Complaints/Reason for Referral:  Carol Mendoza was referred for a MBS to assess the efficiency of his/her swallow function, assess for aspiration, and to make recommendations regarding safe dietary consistencies, effective compensatory strategies, and safe eating environment. Onset of problem:     The patient is a pleasant 77 y.o. male with past medical history signifiacnt for parkinsonism disease, hypertension and hyperlipidemia presents through EMS with chief complaint of syncope that occurred just prior to arrival.  History was mostly provided by the caretaker the ER resident.      Report patient is currently at his baseline, was diagnosed with Parkinson's but not on treatment as he was far too progressed in treatment would not be helpful. He normally ambulates at home without significant difficulty but started having lower extremity weakness in the last several weeks. On 5/15/2022 when he was on the toilet he was unable to get back due to weakness, required lift assist.  Today his cousin was at his house helping clean the linens as he wet the bed which was new for him. She states he was sitting in the wheeled walker next to the bed and he became unresponsive to voice and sternal rub. No abnormal shaking or body movements at that time she was unable to wake him and called EMS. He remained unresponsive until they were in route-approximately 5 minutes, and started waking up just prior to arrival.  This is now the second time this has occurred. The first time was 2022 and reportedly had immediate recovery after the episode without confusion. There was bowel and bladder incontinence at that time. No seizure activity. His initial blood pressure was decreased but reading was hypertensive in the emergency room. Patient was unable to give a clear history other than saying that his back was hurting and his legs were weak.     Longstanding heavy drinker, usually goes to the bar regularly for 2 to 4 weeks ago became too weak and stopped driving, has not drank since then, brother states that there is no alcohol in the home. Family reports bilateral upper extremity tremors for over 10 years. Behavior/Cognition/Vision/Hearing:  Behavior/Cognition: Alert; Cooperative  Vision: Impaired  Hearing: Exceptions to [facility]    Impressions:  Pt presents with Severe oral and pharyngeal phase dysphagia characterized by decreased A-P transit and bolus formation with + penetration and silent aspiration of combined Puree and nectar trial.  Pt. With decreased laryngeal elevation, decreased epiglottic inversion, decreased crico opening an decreased pharyngeal constriction. Recommend NPO with alternative means of nutrition at this time. Patient Position: Lateral       Consistencies Administered: Pureed;Mildly Thick teaspoon    Dysphagia Outcome Severity Scale: Level 1: Severe dysphagia- NPO. Unable to tolerate any PO safely  Penetration-Aspiration Scale (PAS): 8 - Material Enters the airway, passes below the vocal folds, and no effort is made to eject    Recommended Diet:  Solid consistency: NPO  Liquid consistency: NPO       Medication administration: Via NG/PEG    Recommendations/Treatment  Requires SLP Intervention: Yes           Postural Changes and/or Swallow Maneuvers: Upright 90 degrees      Recommended Exercises:    Therapeutic Interventions: Oral motor exercises; Laryngeal exercises; Pharyngeal exercises         Education: Images and recommendations were reviewed with pt following this exam.   Patient Education: yes  Patient Education Response: Needs reinforcement    Safety Devices  Restraints Initially in Place: No      Goals:    Long Term:     To Maximize safety with intake, optimize nutrition/hydration and minimize risk for aspiration. Short Term:     Goal 1: O/M treatment program for dysphagia       Oral Preparation / Oral Phase: Impaired:  Decreased bolus formation, oral manipulation and A-P transit noted with Puree, Nectar mixed with Puree was able to move A-P. Pharyngeal Phase: Puree: Bolus was not transferred A-P for swallow function. Nectar thick: + penetration with + aspiration of puree mixed with nectar with + latent wet cough. Mod vallec and pyriform stasis.          Esophageal Phase  Esophageal Screen: Impaired  Upper Esophageal Screen- Major Contributing Deficits  Major Contributing Deficits: Reduced Cricopharyngeal Opening        Pain: 0/10     Pain Level:  (mandeep)  Pain Location: Shoulder      Therapy Time:   Individual Concurrent Group Co-treatment   Time In  1145         Time Out  1200         Minutes  [address], SLP, 5/24/2022, 2:51 PM

## 2022-05-24 NOTE — PLAN OF CARE
Paged by Bryant Hummel RN stating blood pressure was elevated with a systolic in the 424R and large amount of secretions the patient has been unable to clear. Went bedside to evaluate the patient. Saturating 98% on 4 L O2. Offered to suction patient secretions patient declined stating he is too tired and would do it later. Plan  -Hydralazine 5 mg x1 for blood pressure  -Suctioning apparatus at bedside when patient feels less tired. Asked respiratory to suction if possible  -Defer chest x-ray for now as patient has been on 4 L and has not required an increase in the amount of oxygen as well as patient is saturating well  -Monitor oxygen saturation  -Patient remains full code and stated he wanted everything done to save his life if patient were to need critical intervention such as intubation or chest compressions which was witnessed by Bryant Hummel RN who was in the room with angel Miller MD  [facility];  [place], [place]

## 2022-05-24 NOTE — CARE COORDINATION
Received a call from [employer] at the [place] she informs me that I will need to contact the patients  to start Lawernce Chess. [place] Prior [phone]    She educated me on why the precert needs to be done at the hospital level. (d/t the time it takes to get an answer). She also tells me that the [place] staff has 3 days to respond to a phone call, e mail or any communication because of the case loads that they have. Called Sabine shafer voicemail. Called Iban Kwok at [facility] and updated her on the conversation I had with [facility] at the [address]  Received a call from [place] at [facility] and she informs me that he has no coverage so there is no need to a Lawernce Chess. I asked if I could provide Abbey Inman her phone number so they can speak to her she has agreed. Called Iban Kwok updated her on the conversation and provided her with the phone number. Asked her to please have her billing department call and discuss prior auth. She agrees. Called  [facility]. Spoke to [facility] about admissions for Covid patients. She informs me that can accept a patient 10 out from testing positive but they will still have to isolate for 7 more days and that they have no Isolation beds until 5/28/2022. Referral sent. Notified [place] RN case manager on 4B that the patient has been accepted to [facility] and we are awaiting Discharge.   HENS IS complete

## 2022-05-24 NOTE — PLAN OF CARE
Got a page regarding patient's diminished air entry on lung bases and very wet on upper and lower air way. Patient has new 3L O2 requirement. patient is unable to cough up sputum due to weak cough. Chest exam shows coarse crackles which temporarily clears up with cough. We will do CXR, RT eval and treat for chest physiotherapy.     John Galicia MD.  Internal Medicine Resident PGY Noah Rodriguez   5/24/2022, 3:58 AM

## 2022-05-24 NOTE — CARE COORDINATION
Stephan Del Rosario the patient's niece to inform her of discharge. She asks about him medications. I went over those meds with her. She is asking why he is not on any of his home medications. Notified the physician (Dr. Meenakshi Henao) that the home meds need to be entered and reconciled. 8730 Patient's discharge has been cancelled d/t possible aspiration. Swallow Study study ordered. Awaiting results    Called Edwin Mack at [facility], she tells me that they can accept patient without the [place] piece.   I informed her that the D/c cancelled for today and would contact her in the AM if the patient is ready to discharge

## 2022-05-24 NOTE — PLAN OF CARE
Problem: Discharge Planning  Goal: Discharge to home or other facility with appropriate resources  5/24/2022 1044 by Mariella Mcgee RN  Outcome: Progressing  5/23/2022 2203 by Olya Ocasio RN  Outcome: Progressing  Flowsheets (Taken 5/23/2022 2000)  Discharge to home or other facility with appropriate resources:   Identify barriers to discharge with patient and caregiver   Arrange for needed discharge resources and transportation as appropriate     Problem: Skin/Tissue Integrity  Goal: Absence of new skin breakdown  Description: 1. Monitor for areas of redness and/or skin breakdown  2. Assess vascular access sites hourly  3. Every 4-6 hours minimum:  Change oxygen saturation probe site  4. Every 4-6 hours:  If on nasal continuous positive airway pressure, respiratory therapy assess nares and determine need for appliance change or resting period. 5/24/2022 1044 by Mariella Mcgee RN  Outcome: Progressing  5/23/2022 2203 by Olya Ocasio RN  Outcome: Progressing     Problem: Safety - Adult  Goal: Free from fall injury  5/24/2022 1044 by Mariella Mcgee RN  Outcome: Progressing  5/23/2022 2203 by Olya Ocasio RN  Outcome: Progressing  Flowsheets (Taken 5/23/2022 2000)  Free From Fall Injury:   Instruct family/caregiver on patient safety   Based on caregiver fall risk screen, instruct family/caregiver to ask for assistance with transferring infant if caregiver noted to have fall risk factors     Problem: Confusion  Goal: Confusion, delirium, dementia, or psychosis is improved or at baseline  Description: INTERVENTIONS:  1. Assess for possible contributors to thought disturbance, including medications, impaired vision or hearing, underlying metabolic abnormalities, dehydration, psychiatric diagnoses, and notify attending LIP  2. [place] high risk fall precautions, as indicated  3. Provide frequent short contacts to provide reality reorientation, refocusing and direction  4.  Decrease environmental stimuli, including noise as appropriate  5. Monitor and intervene to maintain adequate nutrition, hydration, elimination, sleep and activity  6. If unable to ensure safety without constant attention obtain sitter and review sitter guidelines with assigned personnel  7.  Initiate Psychosocial CNS and Spiritual Care consult, as indicated  5/24/2022 1044 by Aly Patricia RN  Outcome: Progressing  5/23/2022 2203 by Nannette Donald RN  Outcome: Progressing  Flowsheets (Taken 5/23/2022 2000)  Effect of thought disturbance (confusion, delirium, dementia, or psychosis) are managed with adequate functional status:   Assess for contributors to thought disturbance, including medications, impaired vision or hearing, underlying metabolic abnormalities, dehydration, psychiatric diagnoses, notify [facility] high risk fall precautions, as indicated   Provide frequent short contacts to provide reality reorientation, refocusing and direction   Decrease environmental stimuli, including noise as appropriate   Monitor and intervene to maintain adequate nutrition, hydration, elimination, sleep and activity   If unable to ensure safety without constant attention obtain sitter and review sitter guidelines with assigned personnel     Problem: Pain  Goal: Verbalizes/displays adequate comfort level or baseline comfort level  5/24/2022 1044 by Aly Patricia RN  Outcome: Progressing  5/23/2022 2203 by Nannette Donald RN  Outcome: Progressing

## 2022-05-24 NOTE — PROGRESS NOTES
Pages concerning change in respiratory status:  Wet lung sounds, weak wet cough pt prompted to DB cough, would only do with instruction     Patient refused to sit up >30 degrees despite education. Eventually came to agreement to elevate HOB, pt did not want me to turn him. Has been on 3L NC since I took over care, likely started post aspiration. Pt was anxious and worked up, IM resident on unit discussed the anxiety and zyprexa 5mg IV ordered. Once given pt anxiety was resolved. 5/24/22 2:53 AM  could you come lay eyes on the pt, I had to do OP suctioning multiple times. he sounds very wet. frothy sputum was suctioned out. satting fine on 3L nc only coughs on command poor effort on patient's end  Read 2:53 AM     5/24/22 2:55 AM  Diminished lung bases very wet on uppers as well as airway  Read 2:55 AM    Increased o2 to 4L NC from 3L     5/24/22 3:11 AM  are you able to come up? Read 3:11 AM      Called crit care resident to come evaluate. Resident called IM resident to see if he was on his way. 5/24/22 3:22 AM   Yes i am gonna come    Resident on unit, evaluated and Ordered CXR, chest physiotherapy more OP suction on patient. Pt turned to right side per resident.    0540 CXR done, Crit care resident f/u on earlier convo, relayed no needs at this time from a crit care standpoint CT

## 2022-05-25 LAB
ABSOLUTE EOS #: 0.04 K/UL (ref 0–0.44)
ABSOLUTE IMMATURE GRANULOCYTE: 0.18 K/UL (ref 0–0.3)
ABSOLUTE LYMPH #: 0.8 K/UL (ref 1.1–3.7)
ABSOLUTE MONO #: 0.85 K/UL (ref 0.1–1.2)
ANION GAP SERPL CALCULATED.3IONS-SCNC: 10 MMOL/L (ref 9–17)
BASOPHILS # BLD: 0 % (ref 0–2)
BASOPHILS ABSOLUTE: 0.04 K/UL (ref 0–0.2)
BUN BLDV-MCNC: 8 MG/DL (ref 8–23)
CALCIUM SERPL-MCNC: 7.4 MG/DL (ref 8.6–10.4)
CHLORIDE BLD-SCNC: 111 MMOL/L (ref 98–107)
CO2: 21 MMOL/L (ref 20–31)
CREAT SERPL-MCNC: 0.72 MG/DL (ref 0.7–1.2)
EOSINOPHILS RELATIVE PERCENT: 0 % (ref 1–4)
GFR AFRICAN AMERICAN: >60 ML/MIN
GFR NON-AFRICAN AMERICAN: >60 ML/MIN
GFR SERPL CREATININE-BSD FRML MDRD: ABNORMAL ML/MIN/{1.73_M2}
GLUCOSE BLD-MCNC: 103 MG/DL (ref 70–99)
GLUCOSE BLD-MCNC: 75 MG/DL (ref 75–110)
GLUCOSE BLD-MCNC: 76 MG/DL (ref 75–110)
GLUCOSE BLD-MCNC: 89 MG/DL (ref 75–110)
HCT VFR BLD CALC: 29.4 % (ref 40.7–50.3)
HEMOGLOBIN: 9.9 G/DL (ref 13–17)
IMMATURE GRANULOCYTES: 2 %
LYMPHOCYTES # BLD: 7 % (ref 24–43)
MAGNESIUM: 1.7 MG/DL (ref 1.6–2.6)
MCH RBC QN AUTO: 30.3 PG (ref 25.2–33.5)
MCHC RBC AUTO-ENTMCNC: 33.7 G/DL (ref 28.4–34.8)
MCV RBC AUTO: 89.9 FL (ref 82.6–102.9)
MONOCYTES # BLD: 7 % (ref 3–12)
NRBC AUTOMATED: 0 PER 100 WBC
PDW BLD-RTO: 14.7 % (ref 11.8–14.4)
PLATELET # BLD: 175 K/UL (ref 138–453)
PMV BLD AUTO: 11.5 FL (ref 8.1–13.5)
POTASSIUM SERPL-SCNC: 3.4 MMOL/L (ref 3.7–5.3)
RBC # BLD: 3.27 M/UL (ref 4.21–5.77)
RBC # BLD: ABNORMAL 10*6/UL
SEG NEUTROPHILS: 84 % (ref 36–65)
SEGMENTED NEUTROPHILS ABSOLUTE COUNT: 10.22 K/UL (ref 1.5–8.1)
SODIUM BLD-SCNC: 142 MMOL/L (ref 135–144)
WBC # BLD: 12.1 K/UL (ref 3.5–11.3)

## 2022-05-25 PROCEDURE — 82947 ASSAY GLUCOSE BLOOD QUANT: CPT

## 2022-05-25 PROCEDURE — 99232 SBSQ HOSP IP/OBS MODERATE 35: CPT | Performed by: INTERNAL MEDICINE

## 2022-05-25 PROCEDURE — 36415 COLL VENOUS BLD VENIPUNCTURE: CPT

## 2022-05-25 PROCEDURE — 2060000000 HC ICU INTERMEDIATE R&B

## 2022-05-25 PROCEDURE — 99233 SBSQ HOSP IP/OBS HIGH 50: CPT | Performed by: PSYCHIATRY & NEUROLOGY

## 2022-05-25 PROCEDURE — 94640 AIRWAY INHALATION TREATMENT: CPT

## 2022-05-25 PROCEDURE — 94667 MNPJ CHEST WALL 1ST: CPT

## 2022-05-25 PROCEDURE — 94668 MNPJ CHEST WALL SBSQ: CPT

## 2022-05-25 PROCEDURE — 85025 COMPLETE CBC W/AUTO DIFF WBC: CPT

## 2022-05-25 PROCEDURE — 83735 ASSAY OF MAGNESIUM: CPT

## 2022-05-25 PROCEDURE — 2580000003 HC RX 258: Performed by: INTERNAL MEDICINE

## 2022-05-25 PROCEDURE — 2500000003 HC RX 250 WO HCPCS: Performed by: STUDENT IN AN ORGANIZED HEALTH CARE EDUCATION/TRAINING PROGRAM

## 2022-05-25 PROCEDURE — 80048 BASIC METABOLIC PNL TOTAL CA: CPT

## 2022-05-25 PROCEDURE — 6360000002 HC RX W HCPCS

## 2022-05-25 PROCEDURE — 2700000000 HC OXYGEN THERAPY PER DAY

## 2022-05-25 PROCEDURE — 94761 N-INVAS EAR/PLS OXIMETRY MLT: CPT

## 2022-05-25 PROCEDURE — 6370000000 HC RX 637 (ALT 250 FOR IP)

## 2022-05-25 PROCEDURE — 2500000003 HC RX 250 WO HCPCS

## 2022-05-25 PROCEDURE — 6360000002 HC RX W HCPCS: Performed by: STUDENT IN AN ORGANIZED HEALTH CARE EDUCATION/TRAINING PROGRAM

## 2022-05-25 PROCEDURE — 99291 CRITICAL CARE FIRST HOUR: CPT | Performed by: INTERNAL MEDICINE

## 2022-05-25 PROCEDURE — 6360000002 HC RX W HCPCS: Performed by: INTERNAL MEDICINE

## 2022-05-25 RX ORDER — POTASSIUM CHLORIDE 7.45 MG/ML
10 INJECTION INTRAVENOUS
Status: COMPLETED | OUTPATIENT
Start: 2022-05-25 | End: 2022-05-25

## 2022-05-25 RX ORDER — HYDRALAZINE HYDROCHLORIDE 20 MG/ML
10 INJECTION INTRAMUSCULAR; INTRAVENOUS ONCE
Status: COMPLETED | OUTPATIENT
Start: 2022-05-25 | End: 2022-05-25

## 2022-05-25 RX ORDER — LABETALOL HYDROCHLORIDE 5 MG/ML
10 INJECTION, SOLUTION INTRAVENOUS EVERY 4 HOURS PRN
Status: DISCONTINUED | OUTPATIENT
Start: 2022-05-25 | End: 2022-05-27 | Stop reason: HOSPADM

## 2022-05-25 RX ORDER — AMLODIPINE BESYLATE 10 MG/1
10 TABLET ORAL DAILY
Status: DISCONTINUED | OUTPATIENT
Start: 2022-05-26 | End: 2022-05-26

## 2022-05-25 RX ORDER — ALBUTEROL SULFATE 2.5 MG/3ML
2.5 SOLUTION RESPIRATORY (INHALATION)
Status: DISCONTINUED | OUTPATIENT
Start: 2022-05-25 | End: 2022-05-27 | Stop reason: HOSPADM

## 2022-05-25 RX ORDER — IPRATROPIUM BROMIDE AND ALBUTEROL SULFATE 2.5; .5 MG/3ML; MG/3ML
1 SOLUTION RESPIRATORY (INHALATION)
Status: DISCONTINUED | OUTPATIENT
Start: 2022-05-25 | End: 2022-05-27 | Stop reason: HOSPADM

## 2022-05-25 RX ORDER — LABETALOL HYDROCHLORIDE 5 MG/ML
10 INJECTION, SOLUTION INTRAVENOUS EVERY 6 HOURS PRN
Status: DISCONTINUED | OUTPATIENT
Start: 2022-05-25 | End: 2022-05-25

## 2022-05-25 RX ADMIN — IPRATROPIUM BROMIDE AND ALBUTEROL SULFATE 1 AMPULE: .5; 3 SOLUTION RESPIRATORY (INHALATION) at 15:56

## 2022-05-25 RX ADMIN — POTASSIUM CHLORIDE 10 MEQ: 7.46 INJECTION, SOLUTION INTRAVENOUS at 07:58

## 2022-05-25 RX ADMIN — PIPERACILLIN AND TAZOBACTAM 3375 MG: 3; .375 INJECTION, POWDER, FOR SOLUTION INTRAVENOUS at 18:03

## 2022-05-25 RX ADMIN — Medication 10 MG: at 14:05

## 2022-05-25 RX ADMIN — Medication 10 MG: at 08:59

## 2022-05-25 RX ADMIN — POTASSIUM CHLORIDE 10 MEQ: 7.46 INJECTION, SOLUTION INTRAVENOUS at 09:01

## 2022-05-25 RX ADMIN — IPRATROPIUM BROMIDE AND ALBUTEROL SULFATE 1 AMPULE: .5; 3 SOLUTION RESPIRATORY (INHALATION) at 21:10

## 2022-05-25 RX ADMIN — ENOXAPARIN SODIUM 40 MG: 100 INJECTION SUBCUTANEOUS at 07:43

## 2022-05-25 RX ADMIN — POTASSIUM CHLORIDE 10 MEQ: 7.46 INJECTION, SOLUTION INTRAVENOUS at 10:01

## 2022-05-25 RX ADMIN — POTASSIUM CHLORIDE 10 MEQ: 7.46 INJECTION, SOLUTION INTRAVENOUS at 11:01

## 2022-05-25 RX ADMIN — HYDRALAZINE HYDROCHLORIDE 10 MG: 20 INJECTION, SOLUTION INTRAMUSCULAR; INTRAVENOUS at 01:28

## 2022-05-25 ASSESSMENT — ENCOUNTER SYMPTOMS
COLOR CHANGE: 0
ABDOMINAL DISTENTION: 0
COUGH: 0
PHOTOPHOBIA: 0
EYE DISCHARGE: 0
APNEA: 0

## 2022-05-25 NOTE — PROGRESS NOTES
Pt b/p 181/80  Continues to be congested in throat  Unable to take oral meds  On call notified and in to visit pt

## 2022-05-25 NOTE — PROGRESS NOTES
[facility]  Internal Medicine Teaching Residency Program  Inpatient Daily Progress Note  ______________________________________________________________________________    Patient: Kate Estimable  YOB: 1945   HCR:[id number]    Acct: [de-identified]     Room: Pending sale to [facility]  Admit date: 5/15/2022  Today's date: 05/25/22  Number of days in the hospital: 10    SUBJECTIVE   Admitting Diagnosis: Weakness  CC:  Syncopal episode, weakness  Pt examined at bedside. Chart & results reviewed. Weak, unable to clear secretions   Rales on chest exam clear with cough temporarily, weak cough  On O2 via NC 4 L  AAO x3, difficult to understand because of weak laryngeal muscle  Hypertensive overnight, afebrile  Leukocytosis and hypokalemic      ROS:  Constitutional:  negative for chills, fevers, sweats  Respiratory: positive for cough,  shortness of breath, rales  Cardiovascular:  negative for chest pain, chest pressure/discomfort, lower extremity edema, palpitations  Gastrointestinal:  negative for abdominal pain, constipation, diarrhea, nausea, vomiting  Neurological:  negative for dizziness, headache  BRIEF HISTORY     The patient is a pleasant 76 y. o. male with past medical history signifiacnt for parkinsonism disease, hypertension and hyperlipidemia presents through EMS with chief complaint of syncope that occurred just prior to arrival.  History was mostly provided by the caretaker the ER resident.      Patient is admitted for syncopal episode work-up.  Likely multifactorial due to dysautonomia, orthostatic hypotension, neurocardiogenic, neurogenic due to cervical spinal stenosis and canal narrowing.  Cardiology consulted for ruling out cardiogenic causes.  Plan for echo.     Dehydration secondary to decreased oral intake causing KALYAN, low blood pressure, orthostatic hypotension.   COVID-positive.  ID following  Neurology: Signed off  Echo LVEF > 55%  Holter monitoring on discharge per cardio  F/u with NSG in 4 weeks. Weak cough and dysphagia. Keep NPO will talk to family regarding plan of care    OBJECTIVE     Vital Signs:  BP (!) 165/81   Pulse 80   Temp 98.1 °F (36.7 °C) (Axillary)   Resp 30   Ht 5' 9.02\" (1.753 m)   Wt 141 lb (64 kg)   SpO2 97%   BMI 20.82 kg/m²     Temp (24hrs), Av.8 °F (36.6 °C), Min:97.1 °F (36.2 °C), Max:98.1 °F (36.7 °C)    In: 387.6   Out: 500 [Urine:500]    Physical Exam:  Constitutional: This is a  18.5-24.9 - Normal 77y.o. year old male who is cooperative and in no apparent distress. Head:normocephalic and atraumatic. EENT:  PERRLA. No conjunctival injections. Septum was midline, mucosa was without erythema, exudates or cobblestoning. No thrush was noted. Neck: Supple without thyromegaly. No elevated JVP. Trachea was midline. Respiratory: Chest was symmetrical without dullness to percussion. Decrease air entry B/L, rales on chest exam clears with cough. There is no intercostal retraction or use of accessory muscles. No egophony noted. Cardiovascular: Regular without murmur, clicks, gallops or rubs. Abdomen: Slightly rounded and soft without organomegaly. No rebound, rigidity or guarding was appreciated. Musculoskeletal: Normal curvature of the spine. No gross muscle weakness. Extremities:  No lower extremity edema, ulcerations, tenderness, varicosities or erythema. Muscle size, tone and strength are normal.  No involuntary movements are noted. Skin:  Warm and dry. Good color, turgor and pigmentation. No lesions or scars.   No cyanosis or clubbing  Neurological/Psychiatric: The patient's general behavior, level of consciousness, thought content and emotional status is normal.        Medications:  Scheduled Medications:    amLODIPine  5 mg Oral Daily    ferrous sulfate  325 mg Oral Daily with breakfast    enoxaparin  40 mg SubCUTAneous Daily    vitamin D  50,000 Units Oral Weekly    carbidopa-levodopa  1 tablet Oral TID    sodium chloride flush  5-40 mL IntraVENous 2 times per day     Continuous Infusions:    dextrose 5 % and 0.9 % NaCl 75 mL/hr at 05/24/22 2126    dextrose 100 mL/hr (05/19/22 0340)    sodium chloride       PRN Medicationssennosides-docusate sodium, 2 tablet, Daily PRN  sodium chloride, 30 mL, PRN  glucose, 4 tablet, PRN  dextrose bolus, 125 mL, PRN   Or  dextrose bolus, 250 mL, PRN  glucagon (rDNA), 1 mg, PRN  dextrose, 100 mL/hr, PRN  sodium chloride flush, 10 mL, PRN  sodium chloride flush, 5-40 mL, PRN  sodium chloride, , PRN  ondansetron, 4 mg, Q8H PRN   Or  ondansetron, 4 mg, Q6H PRN  polyethylene glycol, 17 g, Daily PRN  acetaminophen, 650 mg, Q6H PRN   Or  acetaminophen, 650 mg, Q6H PRN        Diagnostic Labs:  CBC:   Recent Labs     05/23/22  0709 05/24/22  0554 05/25/22  0559   WBC 6.7 10.2 12.1*   RBC 3.32* 3.39* 3.27*   HGB 10.2* 10.2* 9.9*   HCT 29.9* 30.6* 29.4*   MCV 90.1 90.3 89.9   RDW 14.2 14.5* 14.7*   PLT See Reflexed IPF Result 156 175     BMP:   Recent Labs     05/23/22  0709 05/24/22  0554 05/25/22  0559   * 143 142   K 3.5* 3.6* 3.4*    112* 111*   CO2 21 22 21   BUN 13 10 8   CREATININE 0.77 0.82 0.72     BNP: No results for input(s): BNP in the last 72 hours. PT/INR: No results for input(s): PROTIME, INR in the last 72 hours. APTT: No results for input(s): APTT in the last 72 hours. CARDIAC ENZYMES: No results for input(s): CKMB, CKMBINDEX, TROPONINI in the last 72 hours. Invalid input(s): CKTOTAL;3  FASTING LIPID PANEL:No results found for: CHOL, HDL, TRIG  LIVER PROFILE: No results for input(s): AST, ALT, ALB, BILIDIR, BILITOT, ALKPHOS in the last 72 hours. MICROBIOLOGY:   Lab Results   Component Value Date/Time    CULTURE NO GROWTH 5 DAYS 05/17/2022 09:05 AM       Imaging:    XR SHOULDER RIGHT (MIN 2 VIEWS)    Result Date: 5/19/2022  Moderate glenohumeral and moderate to severe acromioclavicular joint degenerative change without fracture.  High riding humeral head indicative of chronic high-grade rotator cuff pathology. XR CHEST PORTABLE    Result Date: 5/24/2022  Stable bibasilar parenchymal opacities and bilateral pleural effusions. XR CHEST PORTABLE    Result Date: 5/21/2022  1. Stable mild bibasilar airspace disease and small bilateral pleural effusions. Overall, stable chest x-ray from 05/20/2022, 1333 hours. Follow-up is recommended to document resolution. 2. Stable elevation of the right hemidiaphragm. 3. Stable cardiomegaly. XR CHEST PORTABLE    Result Date: 5/20/2022  Elevation of the right hemidiaphragm with right basilar airspace opacities. These opacities could represent atelectasis or in the appropriate clinical setting pneumonia. FL MODIFIED BARIUM SWALLOW W VIDEO    Result Date: 5/24/2022  1. Penetration followed by aspiration with the nectar thick substance. 2. No pureed/pudding thick substance was swallowed into it was mixed with the nectar thick substance. 3. Remainder of the substances not attempted. Please see separate speech pathology report for full discussion of findings and recommendations. RECOMMENDATIONS: Unavailable       ASSESSMENT & PLAN     Principal Problem (Resolved):    Weakness  Active Problems:    Parkinson's disease (HCC)    COVID-19    Cervical myelopathy (HCC)    Iron deficiency anemia    Thrombocytopenia (HCC)    Moderate protein-calorie malnutrition (HCC)  Resolved Problems:    Syncope and collapse    Hypotension    KALYAN (acute kidney injury) (HCC)    Orthostatic hypertension    CRP elevated    1. Syncope and collapse.  Multifactorial.  Likely 2/2 orthostatic hypotension 2/2 dysautonomia due to Parkinson's disease/dehydration vs cervical myelopathy vs cardiogenic vs COVID infection.  MRI cervical spine shows cervical canal narrowing with myelopathy. Encourage oral intake (has poor appetite, refuses oral intake). Echo LVEF >55 %. Holter monitoring as OP. Follow up with cardio.   2. Risk of aspiration due weak laryngeal muscle due parkinson's disease. Keep NPO. Will need PEG/OG/NG tube. Will talk to family  3. Cervical myelopathy: Symptomatic. Neurosurgery evaluated. OP surgery follow up. 4. Parkinson's disease.  Continue on Sinemet 1 tablet 3 times daily. 5. COVID-19 infection, on 4 L via NC.  Airborne precautions. S/p  Remdesivir for 5 days.   6. KALYAN.  Resolved  7. Orthostatic hypotension: Resolved.   8. Chronic alcoholism.  Multivitamin and folate supplementation.     DVT ppx : lovenox  GI ppx: not needed  PT/OT: Following  Discharge Planning / SW: CM to assist with    Farhat Saleem MD  Internal Medicine Resident, PGY-1  [address], [place]  5/25/2022, 7:18 AM   Attending Physician Statement  I have discussed the care of Rosario Gonzalez, including pertinent history and exam findings,  with the resident. I have seen and examined the patient and the key elements of all parts of the encounter have been performed by me. I agree with the assessment, plan and orders as documented by the resident with additions . Hypoxic resp. Failure due to COVID and COPD.also has dysphagia. Will check for myasthenia. Treatment plan Discussed with nursing staff in detail , all questions answered . Electronically signed by Sam Hernandez MD on   5/25/22 at 11:23 AM EDT  Cc 30 minutes  Please note that this chart was generated using voice recognition Dragon dictation software. Although every effort was made to ensure the accuracy of this automated transcription, some errors in transcription may have occurred.

## 2022-05-25 NOTE — CARE COORDINATION
Received a call from [facility]. Updated her on clinicals.  informs me that if the family decides not to PEG him, they will need a plan for nutrition.

## 2022-05-25 NOTE — PROGRESS NOTES
[facility] -   Infectious diseases evaluation  admission date 5/15/2022    reason for consultation:   covid     Impression :   Current:  · Syncope  · COVID, mild illness  · Parkinson  · KALYAN   · CRP elevation    Other:  ·   Discussion / summary of stay / plan of care   ·   Recommendations   · remdesevir 5 days 5/17 -5/21 -  · Pull left iv  - site red - start doxy - stop 5/23  · spirometer to use fr atelectasis  · + swal study and increase o2 requirements, CXr same, repeat CRP    Infection Control Recommendations   · Universal Precautions  · Droplet plus Isolation      Antimicrobial Stewardship Recommendations   · Simplification of therapy  · Targeted therapy    History of Present Illness:   Initial history:  Gretel Maria is a 77y.o.-year-old male who presented to the hospital 5/15 with a syncopal episode, while he was sitting in his wheelchair. No seizure activity  Was having some cough and  COVID-positive tested before he came in. CXR neg  CT chest neg  - CT AP L3 vertebral body multiple round lesions.     pt does have some underlying and uses a wheelchair  Past etoh++    Pt is on RAand very poor historian    Interval changes  5/25/2022   Patient Vitals for the past 8 hrs:   BP Temp Temp src Pulse Resp SpO2   05/25/22 1556 -- -- -- -- 18 99 %   05/25/22 1548 (!) 169/98 98.2 °F (36.8 °C) Oral 83 25 100 %   05/25/22 1212 (!) 172/89 98.8 °F (37.1 °C) Temporal 83 29 99 %   05/25/22 1150 (!) 145/110 -- -- 76 22 --   05/25/22 1005 -- -- -- -- 27 100 %       congested and wet cough - no fever  repeat CXR w R atelectasis  And elevated R adrianne diaphragm    CRP up 31 and iv site is red  cough w some bronchospasm  Seems still congested    On remdesevir    5/23  cough and congestion are both better  Labs still benign  Ok for DC   Disc w medicine  Speech stays sluggish  Weak and cerv mylopathy -NS called -outpt FU    5/24  swall study + and CXR stable Bilat LL infilt,   No fever and no clear signs of aspiration  But chest rattling and on 3 L NC , concerning for a new event   Get CRP  looking for new inflammation      Summary of relevant labs:  Labs:  CRP 12 - 10 - 28 - 31  Creat 1.27  WBC 4.4 - 4.8    Micro:  BC 5/17  U cx 5/17    Imaging:  CXR 5/20  Elevation of the right hemidiaphragm with right basilar airspace opacities. Stable cardiomediastinal silhouette. Left lung is clear. No pneumothorax. No definite pleural effusion       CT chest and AP  neg for pneumonia   Upper Abdomen: Cholelithiasis and free fluid within peritoneal cavity which may be secondary to ascites. I have personally reviewed the past medical history, past surgical history, medications, social history, and family history, and I haveupdated the database accordingly. Allergies:   Patient has no known allergies. Review of Systems:     Review of Systems   Constitutional: Positive for activity change. HENT: Negative for congestion. Eyes: Negative for photophobia and discharge. Respiratory: Negative for apnea and cough. Cardiovascular: Negative for chest pain. Gastrointestinal: Negative for abdominal distention. Endocrine: Negative for heat intolerance and polyphagia. Genitourinary: Negative for dysuria. Musculoskeletal: Negative for arthralgias. Skin: Negative for color change. Allergic/Immunologic: Negative for immunocompromised state. Neurological: Negative for dizziness, tremors, seizures, syncope, light-headedness, numbness and headaches. Hematological: Negative for adenopathy. Psychiatric/Behavioral: Negative for agitation. Physical Examination :       Physical Exam  Constitutional:       General: He is not in acute distress. Appearance: Normal appearance. He is not ill-appearing, toxic-appearing or diaphoretic. HENT:      Head: Normocephalic and atraumatic.       Nose: Nose normal.      Mouth/Throat:      Mouth: Mucous membranes are moist.   Eyes:      General: No scleral icterus. Conjunctiva/sclera: Conjunctivae normal.      Pupils: Pupils are equal, round, and reactive to light. Cardiovascular:      Rate and Rhythm: Normal rate and regular rhythm. Heart sounds: Normal heart sounds. No murmur heard. Pulmonary:      Effort: No respiratory distress. Breath sounds: Normal breath sounds. No stridor. Abdominal:      General: There is no distension. Palpations: Abdomen is soft. There is no mass. Genitourinary:     Comments: No broussard  Musculoskeletal:         General: No swelling, tenderness, deformity or signs of injury. Cervical back: Neck supple. No rigidity or tenderness. Skin:     General: Skin is dry. Coloration: Skin is not jaundiced. Neurological:      General: No focal deficit present. Mental Status: He is alert and oriented to person, place, and time. Psychiatric:         Mood and Affect: Mood normal.         Thought Content: Thought content normal.         Past Medical History:   No past medical history on file. Past Surgical  History:   No past surgical history on file.     Medications:      ipratropium-albuterol  1 ampule Inhalation Q4H WA    amLODIPine  5 mg Oral Daily    ferrous sulfate  325 mg Oral Daily with breakfast    enoxaparin  40 mg SubCUTAneous Daily    vitamin D  50,000 Units Oral Weekly    carbidopa-levodopa  1 tablet Oral TID    sodium chloride flush  5-40 mL IntraVENous 2 times per day       Social History:     Social History     Socioeconomic History    Marital status: Single     Spouse name: Not on file    Number of children: Not on file    Years of education: Not on file    Highest education level: Not on file   Occupational History    Not on file   Tobacco Use    Smoking status: Not on file    Smokeless tobacco: Not on file   Substance and Sexual Activity    Alcohol use: Not on file    Drug use: Not on file    Sexual activity: Not on file   Other Topics Concern    Not on file Social History Narrative    Not on file     Social Determinants of Health     Financial Resource Strain:     Difficulty of Paying Living Expenses: Not on file   Food Insecurity:     Worried About Running Out of Food in the Last Year: Not on file    Ashley of Food in the Last Year: Not on file   Transportation Needs:     Lack of Transportation (Medical): Not on file    Lack of Transportation (Non-Medical): Not on file   Physical Activity:     Days of Exercise per Week: Not on file    Minutes of Exercise per Session: Not on file   Stress:     Feeling of Stress : Not on file   Social Connections:     Frequency of Communication with Friends and Family: Not on file    Frequency of Social Gatherings with Friends and Family: Not on file    Attends Tenriism Services: Not on file    Active Member of [address] [place] [address] [employer] or Organizations: Not on file    Attends Club or Organization Meetings: Not on file    Marital Status: Not on file   Intimate Partner Violence:     Fear of Current or Ex-Partner: Not on file    Emotionally Abused: Not on file    Physically Abused: Not on file    Sexually Abused: Not on file   Housing Stability:     Unable to Pay for Housing in the Last Year: Not on file    Number of [place] in the Last Year: Not on file    Unstable Housing in the Last Year: Not on file       Family History:   No family history on file. Medical Decision Making:   I have independently reviewed/ordered the following labs:    CBC with Differential:   Recent Labs     05/24/22  0554 05/25/22  0559   WBC 10.2 12.1*   HGB 10.2* 9.9*   HCT 30.6* 29.4*    175   LYMPHOPCT 6* 7*   MONOPCT 7 7     BMP:  Recent Labs     05/23/22  0709 05/23/22  0709 05/24/22  0554 05/25/22  0559   *   < > 143 142   K 3.5*   < > 3.6* 3.4*      < > 112* 111*   CO2 21   < > 22 21   BUN 13   < > 10 8   CREATININE 0.77   < > 0.82 0.72   MG 1.6  --   --  1.7    < > = values in this interval not displayed.      Hepatic Function Panel:   No results for input(s): PROT, LABALBU, BILIDIR, IBILI, BILITOT, ALKPHOS, ALT, AST in the last 72 hours. No results for input(s): RPR in the last 72 hours. No results for input(s): HIV in the last 72 hours. No results for input(s): BC in the last 72 hours. Lab Results   Component Value Date    CREATININE 0.72 05/25/2022    GLUCOSE 103 05/25/2022       Detailed results: Thank you for allowing us to participate in the care of this patient. Please call with questions. This note is created with the assistance of a speech recognition program.  While intending to generate adocument that actually reflects the content of the visit, the document can still have some errors including those of syntax and sound a like substitutions which may escape proof reading. It such instances, actual meaningcan be extrapolated by contextual diversion.     Fadumo Tena MD  Office: [phone]  Perfect serve / office [phone]

## 2022-05-25 NOTE — PROGRESS NOTES
Provider notified via perfect serve  Dr Elver Coffey    5/25/22 12:37 PM  [phone] From: Len Valerio [facility] Neuro ICU RE: Kirsten Hager pt Bp is 172/89 PRN med was last given at 0859 it is ordered q6h. pt asymptomatic  Read 12:37 PM   5/25/22 12:38 PM   Thanks for update.

## 2022-05-25 NOTE — PROGRESS NOTES
[facility]i Boothe[facility]. Syncope and collapse [R55]  Weakness [R53.1] . Vitals:    05/25/22 1005   BP:    Pulse:    Resp: 27   Temp:    SpO2: 100%   . Patients home meds are   Prior to Admission medications    Medication Sig Start Date End Date Taking? Authorizing Provider   amLODIPine (NORVASC) 5 MG tablet Take 1 tablet by mouth daily 5/24/22 8/22/22 Yes Oliver Damico MD   carbidopa-levodopa (SINEMET)  MG per tablet Take 1 tablet by mouth 3 times daily 5/20/22 8/18/22 Yes Oliver Damico MD   sennosides-docusate sodium (SENOKOT-S) 8.6-50 MG tablet Take 2 tablets by mouth daily for 15 days 5/21/22 6/5/22 Yes Oliver Damico MD   Ergocalciferol (VITAMIN D) 95379 units CAPS Take 50,000 Units by mouth once a week for 7 doses 5/25/22 7/7/22 Yes Oliver Damico MD   ferrous sulfate (FE TABS) 325 (65 Fe) MG EC tablet Take 1 tablet by mouth daily (with breakfast) 5/20/22 8/18/22 Yes Oliver Damico MD   Nutritional Supplements (BOOST 100 CALORIE SMART) LIQD Take 1 Bottle by mouth in the morning and at bedtime 5/20/22 8/18/22 Yes Oliver Damico MD     Assessment   Patient states that he wear oxygen at home but is unsure of how much. Patient states that he does not take any medication for breathing. Patient states the he does not wear an at home BiPAP or CPAP machine.     RR 27  Breath Sounds: rhonchi      · Bronchodilator assessment at level  3    · [x]    Bronchodilator Assessment  BRONCHODILATOR ASSESSMENT SCORE  Score 0 1 2 3 4 5   Breath Sounds   []  Patient Baseline []  No Wheeze good aeration []  Faint, scattered wheezing, good aeration [x]  Expiratory Wheezing and or moderately diminished []  Insp/Exp wheeze and/or very diminished []  Insp/Exp and/ or marked distress   Respiratory Rate   []  Patient Baseline []  Less than 20 []  Less than 20 [x]  20-25 []  Greater than 25 []  Greater than 25   Peak flow % of Pred or PB [x]  NA   []  Greater than 90% []  81-90% []  71-80% []  Less than or equal to 70%  or unable to perform []  Unable due to Respiratory Distress   Dyspnea re []  Patient Baseline []  No SOB []  No SOB []  SOB on exertion [x]  SOB min activity []  At rest/acute   e FEV% Predicted       [x]  NA []  Above 69%  []  Unable []  Above 60-69%  []  Unable []  Above 50-59%  []  Unable []  Above 35-49%  []  Unable []  Less than 35%  []  Unable          Rossi Boothe RCP  10:08 AM

## 2022-05-25 NOTE — PROGRESS NOTES
New order placed for NG placement. Writer explained to pt what it was and he responded \"I have to think about that. That's a big decision\"  Yesterday I spoke with his cousin Lalitha Clark. I updated her with his condition and not passing the barium swallow study. She stated that she did not think he would want a feeding tub. At this time I am not placing the NG tube that is ordered due to pt declining it. Provider Dr Tess Magana notified via perfect serve. 5/25/22 2:21 PM   [phone] From: Olya Connolly [place] [address] Neuro ICU RE: [facility] order was placed for an NG tube. Pt is declining at this time please see my note for further detail.   Read 2:22 PM         5/25/22 2:22 PM   Ok thanks

## 2022-05-25 NOTE — PLAN OF CARE
--A&Ox3-4 , follows commands. --4l NC. pt at this time has been suctioned 3x by writer this shift. Each time copious amounts of thick tenacious white sputum. Pt coughs but is unable to cough sputum past his throat. --VSS with one dose of PRN meds per [facility]  --incontinent of urine External Catheter in place. --NPO diet maintained, pt repeatedly asks for food and water. Explained to him that he has been aspirating and the risks associated with that. Stated \"I don't care I want food and water\" Explained that he may need a feeding tube if he is unable to eat and drink safely. PT responded \"No\"  --POC discussed  --Call light and bedside table within reach. Bed in locked, low position  --No acute distress      Problem: Discharge Planning  Goal: Discharge to home or other facility with appropriate resources  5/25/2022 0933 by Erik Horowitz RN  Outcome: Progressing  5/24/2022 2215 by Ezekiel Glover RN  Outcome: Progressing     Problem: Skin/Tissue Integrity  Goal: Absence of new skin breakdown  Description: 1. Monitor for areas of redness and/or skin breakdown  2. Assess vascular access sites hourly  3. Every 4-6 hours minimum:  Change oxygen saturation probe site  4. Every 4-6 hours:  If on nasal continuous positive airway pressure, respiratory therapy assess nares and determine need for appliance change or resting period. 5/25/2022 0933 by Erik Horowitz RN  Outcome: Progressing  5/24/2022 2215 by Ezekiel Glover RN  Outcome: Progressing     Problem: Safety - Adult  Goal: Free from fall injury  5/25/2022 0933 by Erik Horowitz RN  Outcome: Progressing  5/24/2022 2215 by Ezekiel Glover RN  Outcome: Progressing     Problem: Confusion  Goal: Confusion, delirium, dementia, or psychosis is improved or at baseline  Description: INTERVENTIONS:  1.  Assess for possible contributors to thought disturbance, including medications, impaired vision or hearing, underlying metabolic abnormalities, dehydration, psychiatric diagnoses, and notify attending LIP  2. [place] high risk fall precautions, as indicated  3. Provide frequent short contacts to provide reality reorientation, refocusing and direction  4. Decrease environmental stimuli, including noise as appropriate  5. Monitor and intervene to maintain adequate nutrition, hydration, elimination, sleep and activity  6. If unable to ensure safety without constant attention obtain sitter and review sitter guidelines with assigned personnel  7.  Initiate Psychosocial CNS and Spiritual Care consult, as indicated  5/25/2022 0933 by Jacey Krishna RN  Outcome: Progressing  5/24/2022 2215 by Diego Joyce RN  Outcome: Progressing     Problem: Pain  Goal: Verbalizes/displays adequate comfort level or baseline comfort level  5/25/2022 0933 by Jacey Krishna RN  Outcome: Progressing  5/24/2022 2215 by Diego Joyce RN  Outcome: Progressing

## 2022-05-25 NOTE — PLAN OF CARE
PROVIDE ADEQUATE OXYGENATION WITH ACCEPTABLE SP02/ABG'S    [x]  IDENTIFY APPROPRIATE OXYGEN THERAPY  [x]   MONITOR SP02/ABG'S AS NEEDED   [x]   PATIENT EDUCATION AS NEEDED   MOBILIZE SECRETIONS    [x]   ASSESS BREATH SOUNDS  [x]   ASSESS SPUTUM PRODUCTION  [x]   COUGH AND DEEP BREATHING  []  IMPLEMENT SECRETION MANAGEMENT PROTOCOL  [x]   PATIENT EDUCATION AS NEEDED

## 2022-05-25 NOTE — PLAN OF CARE
Problem: Skin/Tissue Integrity  Goal: Absence of new skin breakdown  Description: 1. Monitor for areas of redness and/or skin breakdown  2. Assess vascular access sites hourly  3. Every 4-6 hours minimum:  Change oxygen saturation probe site  4. Every 4-6 hours:  If on nasal continuous positive airway pressure, respiratory therapy assess nares and determine need for appliance change or resting period. 5/24/2022 2215 by Ezekiel Glover RN  Outcome: Progressing  5/24/2022 1044 by Erik Horowitz RN  Outcome: Progressing     Problem: Confusion  Goal: Confusion, delirium, dementia, or psychosis is improved or at baseline  Description: INTERVENTIONS:  1. Assess for possible contributors to thought disturbance, including medications, impaired vision or hearing, underlying metabolic abnormalities, dehydration, psychiatric diagnoses, and notify attending LIP  2. [place] high risk fall precautions, as indicated  3. Provide frequent short contacts to provide reality reorientation, refocusing and direction  4. Decrease environmental stimuli, including noise as appropriate  5. Monitor and intervene to maintain adequate nutrition, hydration, elimination, sleep and activity  6. If unable to ensure safety without constant attention obtain sitter and review sitter guidelines with assigned personnel  7.  Initiate Psychosocial CNS and Spiritual Care consult, as indicated  5/24/2022 2215 by Ezekiel Glover RN  Outcome: Progressing  5/24/2022 1044 by Erik Horowitz RN  Outcome: Progressing     Problem: Pain  Goal: Verbalizes/displays adequate comfort level or baseline comfort level  5/24/2022 2215 by Ezekiel Glover RN  Outcome: Progressing  5/24/2022 1044 by Erik Horowitz RN  Outcome: Progressing

## 2022-05-25 NOTE — CONSULTS
improvement in his tremor with the initiation of Sinemet. MRI of the brain was reported as unremarkable. Neurology also saw him on 5/18/2022 and was recommended to continue Sinemet. Neurosurgery saw the patient on 5/18/2022 and recommended possible decompression of the cervical spine. Patient was noted to be COVID-positive at that time as well by neurosurgery. Patient was noted by internal medicine is having difficulty swallowing. A swallowing study by speech therapy indicated significant penetration and aspiration of soft food as well as thin liquids. At this time patient is lying rigidly in bed unresponsive although he can open his eyes and look at me. Past Medical History:     No past medical history on file. Past Surgical History:     No past surgical history on file. Medications Prior to Admission:     Prior to Admission medications    Medication Sig Start Date End Date Taking? Authorizing Provider   amLODIPine (NORVASC) 5 MG tablet Take 1 tablet by mouth daily 5/24/22 8/22/22 Yes Gage Gómez MD   carbidopa-levodopa (SINEMET)  MG per tablet Take 1 tablet by mouth 3 times daily 5/20/22 8/18/22 Yes Gage Gómez MD   sennosides-docusate sodium (SENOKOT-S) 8.6-50 MG tablet Take 2 tablets by mouth daily for 15 days 5/21/22 6/5/22 Yes Gaeg Gómez MD   Ergocalciferol (VITAMIN D) 82679 units CAPS Take 50,000 Units by mouth once a week for 7 doses 5/25/22 7/7/22 Yes Gage Gómez MD   ferrous sulfate (FE TABS) 325 (65 Fe) MG EC tablet Take 1 tablet by mouth daily (with breakfast) 5/20/22 8/18/22 Yes Gage Gómez MD   Nutritional Supplements (BOOST 100 CALORIE SMART) LIQD Take 1 Bottle by mouth in the morning and at bedtime 5/20/22 8/18/22 Yes Gage Gómez MD        Allergies:     Patient has no known allergies. Social History:     Tobacco:    has no history on file for tobacco use. Alcohol:      has no history on file for alcohol use.   Drug Use:  has no history on file for drug use. Family History:     No family history on file. Review of Systems:     Patient is unable to provide any information on his own behalf. Physical Exam:   BP (!) 144/80   Pulse 76   Temp 97.9 °F (36.6 °C) (Oral)   Resp 27   Ht 5' 9.02\" (1.753 m)   Wt 141 lb (64 kg)   SpO2 100%   BMI 20.82 kg/m²   Temp (24hrs), Av.7 °F (36.5 °C), Min:97.1 °F (36.2 °C), Max:98.1 °F (36.7 °C)      General examination:      General Appearance: Rigid in bed. Able to look at examiner without response  HEENT: Normocephalic  Neck: Marked rigidity of neck in all directions. This includes flexion extension and rotation. Marked axial rigidity in general.  Lungs: Shallow breathing  Cardiovascular: normal rate, regular rhythm  Abdomen: Nondistended abdomen  Skin: No gross lesions, rashes, bruising or bleeding on exposed skin area  Extremities: Pulses difficult to palpate  Psych: Patient appears to be unable to vocalize t      Neurological examination:    Mental status   Awakens to verbal and tactile stimuli. Will look at examiner. No word output noted. This appears to be due to marked generalized rigidity     Cranial nerves   II -patient is able to see me. Pupils appear to be equal round minimally reactive. III, IV, VI - conjugate gaze with a tendency to stare. V -appears to perceive tactile stimulation on face                                VII -expressionless                                                            VIII - intact hearing                                                                             IX, X -unable to examine however swallowing study markedly abnormal                                               XI -unable to move                                                    XII -unable to examine     Motor function   he is thinly built. He appears to have generalized deconditioning.   He is unable to move due to marked rigidity likely parkinsonian in nature                 Sensory function  he does appear to perceive tactile stimulation in all 4 extremities. Cerebellar  Marked rigidity all 4 extremities Marked increase in tone in all 4 extremities. Marked parkinsonian tremor left arm noted. Reflex function  suppressed reflexes everywhere except for a 4+ right knee reflex. Toe signs neutral likely due to rigidity   Gait                   not testable         Diagnostics:      Laboratory Testing:  CBC:   Recent Labs     05/23/22  0709 05/24/22  0554 05/25/22  0559   WBC 6.7 10.2 12.1*   HGB 10.2* 10.2* 9.9*   PLT See Reflexed IPF Result 156 175     BMP:    Recent Labs     05/23/22  0709 05/24/22  0554 05/25/22  0559   * 143 142   K 3.5* 3.6* 3.4*    112* 111*   CO2 21 22 21   BUN 13 10 8   CREATININE 0.77 0.82 0.72   GLUCOSE 78 126* 103*         Lab Results   Component Value Date    ALT 6 05/20/2022    AST 28 05/20/2022    TSH 2.93 05/15/2022    [id number] 730 05/15/2022     I did review MRI of cervical spine and agree with radiology interpretation. Impression:      1. Parkinson's disease. Patient is frozen at this time in marked rigidity axial as well as extremities. 2. Neurologic dysfunction of swallowing with aspiration. 3. Diagnosis of COVID. 4. Cervical stenosis with cervical myelopathy has identified by MRI and acknowledged by neurosurgery  5. Dysautonomia with orthostatic hypotension    Plan:      Options are limited for treatment.  Consider an NG tube to give Meds for parkinsons.  Neupro topical and apomorphine are non oral agents but may be difficult to manage in this case due to his overall condition.  Neukrology will follow       Thank you for this very interesting consultation.       Electronically signed by Carolina Smith MD on 5/25/2022 at 10:50 AM      Carolina Smith MD  [facility]  Neurology

## 2022-05-26 ENCOUNTER — APPOINTMENT (OUTPATIENT)
Dept: GENERAL RADIOLOGY | Age: 77
DRG: 056 | End: 2022-05-26
Payer: OTHER GOVERNMENT

## 2022-05-26 PROBLEM — D69.6 THROMBOCYTOPENIA (HCC): Status: RESOLVED | Noted: 2022-05-20 | Resolved: 2022-05-26

## 2022-05-26 LAB
ABSOLUTE EOS #: 0.15 K/UL (ref 0–0.44)
ABSOLUTE IMMATURE GRANULOCYTE: 0.09 K/UL (ref 0–0.3)
ABSOLUTE LYMPH #: 0.8 K/UL (ref 1.1–3.7)
ABSOLUTE MONO #: 0.61 K/UL (ref 0.1–1.2)
ANION GAP SERPL CALCULATED.3IONS-SCNC: 7 MMOL/L (ref 9–17)
BASOPHILS # BLD: 1 % (ref 0–2)
BASOPHILS ABSOLUTE: 0.04 K/UL (ref 0–0.2)
BUN BLDV-MCNC: 8 MG/DL (ref 8–23)
CALCIUM SERPL-MCNC: 7.3 MG/DL (ref 8.6–10.4)
CHLORIDE BLD-SCNC: 108 MMOL/L (ref 98–107)
CO2: 23 MMOL/L (ref 20–31)
CREAT SERPL-MCNC: 0.72 MG/DL (ref 0.7–1.2)
EOSINOPHILS RELATIVE PERCENT: 2 % (ref 1–4)
GFR AFRICAN AMERICAN: >60 ML/MIN
GFR NON-AFRICAN AMERICAN: >60 ML/MIN
GFR SERPL CREATININE-BSD FRML MDRD: ABNORMAL ML/MIN/{1.73_M2}
GLUCOSE BLD-MCNC: 102 MG/DL (ref 75–110)
GLUCOSE BLD-MCNC: 80 MG/DL (ref 75–110)
GLUCOSE BLD-MCNC: 80 MG/DL (ref 75–110)
GLUCOSE BLD-MCNC: 83 MG/DL (ref 75–110)
GLUCOSE BLD-MCNC: 84 MG/DL (ref 75–110)
GLUCOSE BLD-MCNC: 85 MG/DL (ref 70–99)
GLUCOSE BLD-MCNC: 87 MG/DL (ref 75–110)
HCT VFR BLD CALC: 25.9 % (ref 40.7–50.3)
HEMOGLOBIN: 8.5 G/DL (ref 13–17)
IMMATURE GRANULOCYTES: 1 %
LYMPHOCYTES # BLD: 10 % (ref 24–43)
MCH RBC QN AUTO: 30.6 PG (ref 25.2–33.5)
MCHC RBC AUTO-ENTMCNC: 32.8 G/DL (ref 28.4–34.8)
MCV RBC AUTO: 93.2 FL (ref 82.6–102.9)
MONOCYTES # BLD: 8 % (ref 3–12)
NRBC AUTOMATED: 0 PER 100 WBC
PDW BLD-RTO: 15.1 % (ref 11.8–14.4)
PLATELET # BLD: 150 K/UL (ref 138–453)
PMV BLD AUTO: 11.1 FL (ref 8.1–13.5)
POTASSIUM SERPL-SCNC: 3.6 MMOL/L (ref 3.7–5.3)
RBC # BLD: 2.78 M/UL (ref 4.21–5.77)
RBC # BLD: ABNORMAL 10*6/UL
SEG NEUTROPHILS: 79 % (ref 36–65)
SEGMENTED NEUTROPHILS ABSOLUTE COUNT: 6.29 K/UL (ref 1.5–8.1)
SODIUM BLD-SCNC: 138 MMOL/L (ref 135–144)
WBC # BLD: 8 K/UL (ref 3.5–11.3)

## 2022-05-26 PROCEDURE — 83519 RIA NONANTIBODY: CPT

## 2022-05-26 PROCEDURE — 94640 AIRWAY INHALATION TREATMENT: CPT

## 2022-05-26 PROCEDURE — 80048 BASIC METABOLIC PNL TOTAL CA: CPT

## 2022-05-26 PROCEDURE — 74018 RADEX ABDOMEN 1 VIEW: CPT

## 2022-05-26 PROCEDURE — 99222 1ST HOSP IP/OBS MODERATE 55: CPT | Performed by: FAMILY MEDICINE

## 2022-05-26 PROCEDURE — 6360000002 HC RX W HCPCS

## 2022-05-26 PROCEDURE — 99232 SBSQ HOSP IP/OBS MODERATE 35: CPT | Performed by: PSYCHIATRY & NEUROLOGY

## 2022-05-26 PROCEDURE — 2700000000 HC OXYGEN THERAPY PER DAY

## 2022-05-26 PROCEDURE — 2060000000 HC ICU INTERMEDIATE R&B

## 2022-05-26 PROCEDURE — 6370000000 HC RX 637 (ALT 250 FOR IP)

## 2022-05-26 PROCEDURE — 36415 COLL VENOUS BLD VENIPUNCTURE: CPT

## 2022-05-26 PROCEDURE — 2580000003 HC RX 258

## 2022-05-26 PROCEDURE — 2580000003 HC RX 258: Performed by: INTERNAL MEDICINE

## 2022-05-26 PROCEDURE — 86255 FLUORESCENT ANTIBODY SCREEN: CPT

## 2022-05-26 PROCEDURE — 92526 ORAL FUNCTION THERAPY: CPT

## 2022-05-26 PROCEDURE — APPSS30 APP SPLIT SHARED TIME 16-30 MINUTES: Performed by: NURSE PRACTITIONER

## 2022-05-26 PROCEDURE — 6360000002 HC RX W HCPCS: Performed by: INTERNAL MEDICINE

## 2022-05-26 PROCEDURE — 83516 IMMUNOASSAY NONANTIBODY: CPT

## 2022-05-26 PROCEDURE — 6370000000 HC RX 637 (ALT 250 FOR IP): Performed by: STUDENT IN AN ORGANIZED HEALTH CARE EDUCATION/TRAINING PROGRAM

## 2022-05-26 PROCEDURE — 97110 THERAPEUTIC EXERCISES: CPT

## 2022-05-26 PROCEDURE — 2500000003 HC RX 250 WO HCPCS: Performed by: STUDENT IN AN ORGANIZED HEALTH CARE EDUCATION/TRAINING PROGRAM

## 2022-05-26 PROCEDURE — 82947 ASSAY GLUCOSE BLOOD QUANT: CPT

## 2022-05-26 PROCEDURE — 6360000002 HC RX W HCPCS: Performed by: STUDENT IN AN ORGANIZED HEALTH CARE EDUCATION/TRAINING PROGRAM

## 2022-05-26 PROCEDURE — 99232 SBSQ HOSP IP/OBS MODERATE 35: CPT | Performed by: INTERNAL MEDICINE

## 2022-05-26 PROCEDURE — 97116 GAIT TRAINING THERAPY: CPT

## 2022-05-26 PROCEDURE — 97530 THERAPEUTIC ACTIVITIES: CPT

## 2022-05-26 PROCEDURE — 94761 N-INVAS EAR/PLS OXIMETRY MLT: CPT

## 2022-05-26 PROCEDURE — 85025 COMPLETE CBC W/AUTO DIFF WBC: CPT

## 2022-05-26 RX ORDER — ERGOCALCIFEROL 1.25 MG/1
50000 CAPSULE ORAL WEEKLY
Status: DISCONTINUED | OUTPATIENT
Start: 2022-06-01 | End: 2022-05-27 | Stop reason: HOSPADM

## 2022-05-26 RX ORDER — AMOXICILLIN AND CLAVULANATE POTASSIUM 875; 125 MG/1; MG/1
1 TABLET, FILM COATED ORAL 2 TIMES DAILY
Qty: 10 TABLET | Refills: 0 | Status: ON HOLD | DISCHARGE
Start: 2022-05-26 | End: 2022-06-01 | Stop reason: HOSPADM

## 2022-05-26 RX ORDER — ACETAMINOPHEN 650 MG/1
650 SUPPOSITORY RECTAL EVERY 6 HOURS PRN
Status: DISCONTINUED | OUTPATIENT
Start: 2022-05-26 | End: 2022-05-27 | Stop reason: HOSPADM

## 2022-05-26 RX ORDER — HYDRALAZINE HYDROCHLORIDE 20 MG/ML
10 INJECTION INTRAMUSCULAR; INTRAVENOUS EVERY 6 HOURS PRN
Status: DISCONTINUED | OUTPATIENT
Start: 2022-05-26 | End: 2022-05-27 | Stop reason: HOSPADM

## 2022-05-26 RX ORDER — LANOLIN ALCOHOL/MO/W.PET/CERES
325 CREAM (GRAM) TOPICAL
Qty: 90 TABLET | Refills: 0 | Status: SHIPPED | OUTPATIENT
Start: 2022-05-26 | End: 2022-08-24

## 2022-05-26 RX ORDER — AMLODIPINE BESYLATE 10 MG/1
10 TABLET ORAL DAILY
Qty: 30 TABLET | Refills: 3 | DISCHARGE
Start: 2022-05-27 | End: 2022-06-26

## 2022-05-26 RX ORDER — SENNA AND DOCUSATE SODIUM 50; 8.6 MG/1; MG/1
2 TABLET, FILM COATED ORAL DAILY
Qty: 30 TABLET | Refills: 0 | DISCHARGE
Start: 2022-05-26 | End: 2022-06-10

## 2022-05-26 RX ORDER — POTASSIUM CHLORIDE 7.45 MG/ML
40 INJECTION INTRAVENOUS ONCE
Status: COMPLETED | OUTPATIENT
Start: 2022-05-26 | End: 2022-05-26

## 2022-05-26 RX ORDER — ACETAMINOPHEN 160 MG/5ML
650 SOLUTION ORAL EVERY 6 HOURS PRN
Status: DISCONTINUED | OUTPATIENT
Start: 2022-05-26 | End: 2022-05-27 | Stop reason: HOSPADM

## 2022-05-26 RX ORDER — POTASSIUM CHLORIDE 7.45 MG/ML
40 INJECTION INTRAVENOUS
Status: DISCONTINUED | OUTPATIENT
Start: 2022-05-26 | End: 2022-05-26

## 2022-05-26 RX ORDER — LACTOSE-REDUCED FOOD
1 LIQUID (ML) ORAL 2 TIMES DAILY
Qty: 5 EACH | Refills: 3 | Status: ON HOLD | DISCHARGE
Start: 2022-05-26 | End: 2022-06-02 | Stop reason: HOSPADM

## 2022-05-26 RX ORDER — AMLODIPINE BESYLATE 10 MG/1
10 TABLET ORAL DAILY
Status: DISCONTINUED | OUTPATIENT
Start: 2022-05-27 | End: 2022-05-27 | Stop reason: HOSPADM

## 2022-05-26 RX ADMIN — PIPERACILLIN AND TAZOBACTAM 3375 MG: 3; .375 INJECTION, POWDER, FOR SOLUTION INTRAVENOUS at 01:11

## 2022-05-26 RX ADMIN — SODIUM CHLORIDE, PRESERVATIVE FREE 10 ML: 5 INJECTION INTRAVENOUS at 21:21

## 2022-05-26 RX ADMIN — ENOXAPARIN SODIUM 40 MG: 100 INJECTION SUBCUTANEOUS at 08:23

## 2022-05-26 RX ADMIN — CARBIDOPA AND LEVODOPA 1 TABLET: 25; 100 TABLET ORAL at 21:21

## 2022-05-26 RX ADMIN — Medication 10 MG: at 03:10

## 2022-05-26 RX ADMIN — PIPERACILLIN AND TAZOBACTAM 3375 MG: 3; .375 INJECTION, POWDER, FOR SOLUTION INTRAVENOUS at 10:06

## 2022-05-26 RX ADMIN — POLYETHYLENE GLYCOL 3350 17 G: 17 POWDER, FOR SOLUTION ORAL at 13:31

## 2022-05-26 RX ADMIN — AMLODIPINE BESYLATE 10 MG: 10 TABLET ORAL at 13:32

## 2022-05-26 RX ADMIN — Medication 10 MG: at 11:35

## 2022-05-26 RX ADMIN — PIPERACILLIN AND TAZOBACTAM 3375 MG: 3; .375 INJECTION, POWDER, FOR SOLUTION INTRAVENOUS at 18:21

## 2022-05-26 RX ADMIN — CARBIDOPA AND LEVODOPA 1 TABLET: 25; 100 TABLET ORAL at 13:30

## 2022-05-26 RX ADMIN — DEXTROSE AND SODIUM CHLORIDE: 5; 900 INJECTION, SOLUTION INTRAVENOUS at 22:36

## 2022-05-26 RX ADMIN — SODIUM CHLORIDE, PRESERVATIVE FREE 40 ML: 5 INJECTION INTRAVENOUS at 08:24

## 2022-05-26 RX ADMIN — POTASSIUM CHLORIDE 40 MEQ: 7.46 INJECTION, SOLUTION INTRAVENOUS at 08:23

## 2022-05-26 RX ADMIN — IPRATROPIUM BROMIDE AND ALBUTEROL SULFATE 1 AMPULE: .5; 3 SOLUTION RESPIRATORY (INHALATION) at 20:54

## 2022-05-26 RX ADMIN — DEXTROSE AND SODIUM CHLORIDE: 5; 900 INJECTION, SOLUTION INTRAVENOUS at 04:44

## 2022-05-26 RX ADMIN — STANDARDIZED SENNA CONCENTRATE AND DOCUSATE SODIUM 2 TABLET: 8.6; 5 TABLET ORAL at 13:32

## 2022-05-26 RX ADMIN — ACETAMINOPHEN 650 MG: 650 SOLUTION ORAL at 20:24

## 2022-05-26 ASSESSMENT — PAIN DESCRIPTION - DESCRIPTORS: DESCRIPTORS: ACHING

## 2022-05-26 ASSESSMENT — PAIN DESCRIPTION - ORIENTATION: ORIENTATION: RIGHT

## 2022-05-26 ASSESSMENT — PAIN SCALES - GENERAL: PAINLEVEL_OUTOF10: 8

## 2022-05-26 NOTE — PLAN OF CARE
Problem: Respiratory - Adult  Goal: Achieves optimal ventilation and oxygenation  Outcome: Progressing     BRONCHOSPASM/BRONCHOCONSTRICTION     [x]         IMPROVE AERATION/BREATH SOUNDS  [x]   ADMINISTER BRONCHODILATOR THERAPY AS APPROPRIATE  [x]   ASSESS BREATH SOUNDS  []   IMPLEMENT AEROSOL/MDI PROTOCOL  [x]   PATIENT EDUCATION AS NEEDED     PROVIDE ADEQUATE OXYGENATION WITH ACCEPTABLE SP02/ABG'S    [x]  IDENTIFY APPROPRIATE OXYGEN THERAPY  [x]   MONITOR SP02/ABG'S AS NEEDED   [x]   PATIENT EDUCATION AS NEEDED     MOBILIZE SECRETIONS    [x]   ASSESS BREATH SOUNDS  [x]   ASSESS SPUTUM PRODUCTION  [x]   COUGH AND DEEP BREATHING  []  IMPLEMENT SECRETION MANAGEMENT PROTOCOL  [x]   PATIENT EDUCATION AS NEEDED

## 2022-05-26 NOTE — PLAN OF CARE
Called brother Anjelica Matt [phone] to update him regarding declining clinical status and goals of care. Informed him, if his conditions didn't improve he might need PEG tube placement for nutrition and tracheostomy. He verbalized understanding. We will update him regarding further plan.     Mat Bojorquez MD.  Internal Medicine Resident PGY [facility]   5/26/2022, 7:16 AM

## 2022-05-26 NOTE — PLAN OF CARE
Evaluated patient at bedside. AAO x3. Wants to get up and walk. NGT in place. Talked to  regarding goals of care discussion with family. We will keep NGT for 1 week, repeat swallow study. Recommend palliative care follow up in facility and discharge patient to facility.     Beatriz Thorne MD.  Internal Medicine Resident PGY [facility]   5/26/2022, 2:01 PM

## 2022-05-26 NOTE — PROGRESS NOTES
Neurology Nurse Practitioner Progress Note      INTERVAL HISTORY: This is a 77 y.o.  male admitted 5/15/2022 for syncope. This is a follow-up neurology progress note. The patient was examined and the chart was reviewed. Discussed with the pt & RN. There were no acute events overnight. No new motor, sensory, visual or bulbar symptoms. Pt was A&Ox3; on-going resting tremors, specially RUE, rigidity more-so in upper extremities. HPI: Wliey Devine is a 77 y.o. male with H/O Parkinson's disease, HTN, HLD, chronic alcoholism, who was admitted 5/15/2022 for syncope. Patient's cousin reported that patient was sitting in his walker when he became unresponsive to voice and sternal rub. No jerking, tongue bite or incontinence was reported. When she was unable to wake him up, she called EMS. Patient remained unresponsive until they were in route. He started waking up just prior to arrival to [facility] ED. Family reported that patient was diagnosed with Parkinson disease almost 5-6 years ago but was not started on treatment. He was told that he had way too advanced disease that treatment won't be helpful. Patient normally ambulates at home without significant difficulty but for the last several weeks he had been complaining of lower extremity weakness. The day prior to arrival patient was unable to get back up off the toilet & required lift assist.  Patient has longstanding history of alcoholism. Patient's last drink was over 2 weeks ago when he stopped driving due to generalized weakness. Neurology was consulted for Parkinson's management. Patient was started on Sinemet  1 tablet 3 times daily. CT head and MRI brain were negative for acute pathology. MRI C-spine showed myelopathy at the level of C4-5. Neurosurgery was on board. Patient was found to be +COVID-19 on 5/17 and was placed in isolation; ID was on board. Patient passed his barium swallow study on 5/17. We signed off on 5/18.   Over time, patient refused oral intake, got lethargic & hypoglycemic; later required increased oxygen and was unable to cough up his secretions. There was concern for aspiration. He failed repeat swallow study on 5/24 and was made NPO. Neurology was reconsulted on 5/25 for alternate Parkinson's treatment.  ipratropium-albuterol  1 ampule Inhalation Q4H WA    piperacillin-tazobactam  3,375 mg IntraVENous Q8H    amLODIPine  10 mg Oral Daily    ferrous sulfate  325 mg Oral Daily with breakfast    enoxaparin  40 mg SubCUTAneous Daily    vitamin D  50,000 Units Oral Weekly    carbidopa-levodopa  1 tablet Oral TID    sodium chloride flush  5-40 mL IntraVENous 2 times per day       No past medical history on file. No past surgical history on file. PHYSICAL EXAM:    Blood pressure (!) 151/82, pulse 67, temperature 99.8 °F (37.7 °C), temperature source Temporal, resp. rate 19, height 5' 9.02\" (1.753 m), weight 141 lb (64 kg), SpO2 94 %. ROS:  Constitutional  + generalized weakness   HEENT  + tremors   Eyes  Negative for photophobia, pain and discharge    Respiratory  Negative for hemoptysis and sputum    Cardiovascular  Negative for orthopnea, claudication and PND    Gastrointestinal  Negative for abdominal pain, diarrhea, blood in stool    Musculoskeletal  + pain R shoulder (prior surgery)   Skin  Negative for rash or itching    Endo/heme/allergies  Negative for polydipsia, environmental allergy    Psychiatric/behavioral  Negative for suicidal ideation.  Patient is not anxious          Neurological Examination:  Mental status   Alert and oriented x 3; following all commands; mildly dysarthric speech; no hallucinations or delusions   Cranial nerves   II - visual fields intact to confrontation; pupils reactive  III, IV, VI - extraocular muscles intact; no POLI; no nystagmus; no ptosis   V - normal facial sensation                                                               VII - normal facial symmetry VIII - intact hearing                                                                             IX, X - symmetrical palate elevation                                               XI - symmetrical shoulder shrug; c/o R shoulder pain                         XII - midline tongue without atrophy or fasciculation   Motor function  Strength: Able to raise BUEs > BLEs  Rigidity, BUEs > BLEs               Resting tremors, specially RUE   Sensory function Grossly intact     Cerebellar Deferred due to tremors   Reflex function DTRs - intact  Plantars - equivocal response   Gait                  Not tested       DATA    Lab Results   Component Value Date    WBC 8.0 05/26/2022    RBC 2.78 (L) 05/26/2022    HGB 8.5 (L) 05/26/2022    HCT 25.9 (L) 05/26/2022     05/26/2022    ALT 6 05/20/2022    AST 28 05/20/2022     05/26/2022    K 3.6 (L) 05/26/2022    MG 1.7 05/25/2022     (H) 05/26/2022    CREATININE 0.72 05/26/2022    BUN 8 05/26/2022    CO2 23 05/26/2022    TSH 2.93 05/15/2022    [id number] 730 05/15/2022    FOLATE 8.2 05/15/2022       DIAGNOSTIC DATA:  CT HEAD (5/12/2022): Markedly limited exam due to motion artifact. No acute intracranial abnormality       CT SPINE (5/15/2022):   1. Mild levoscoliosis of thoracolumbar junction. 2. Multiple round hypodense lesions throughout the lumbar spine, most   pronounced in L3 vertebral body; osteopenia vs subchondral cystic change? Marrow infiltrative lesions cannot be excluded. MRI BRAIN (5/17/2022): Diffuse cerebral volume loss & moderate chronic microangiopathy      MRI C-SPINE (5/17/2022):   1. C4-5: moderate spinal canal stenosis & severe b/l neural foraminal   narrowing secondary to a posterior disc osteophyte complex, uncovertebral    overgrowth & facet arthropathy. Mild increased T2 signal intensity within    the spinal cord at this level suggesting spondylotic myelpathy.     2. C3-4: moderate spinal canal stenosis & severe b/l neural foraminal narrowing. 3. C5-T1: mild spinal canal stenosis. 4. C6-T1: severe b/l neural foraminal narrowing. MRI L-SPINE (5/15/2022): The round hypodense lesions throughout the lumbar spine correspond to    subchondral cystic change/degenerative findings. L5-S1: grade 1 anterolisthesis, b/l pars defects & severe b/l neural    foraminal narrowing. ECHO (5/18/2022): EF >55%    ORTHOSTATIC BP:  Supine      126/76  Sitting        143/110  Standing    176/135                            IMPRESSION: 77 y.o.  male admitted with  Syncope; most probably due to dysautonomia sec to Parkinosn's    H/O untreated Parkinson's disease (diagnosed 5-6 yrs ago); MRI brain - negative. Was gradually improving on Sinemet, however later developed oropharyngeal weakness & failed swallow study; had been NPO since then (5/24). Neurology was reconsulted for alternate Parkinson's treatment. Pt agreed to get NG tube placed today (5/26)    Concern for aspiration; on Zosyn    C4-5 myelopathy; pt needs to F/U with NS on 7/14/2022 @ 0900    Hooked up to Holter monitor (5/21)    +COVID-19 (5/17/2022)    Chornic alcoholism    Comorbid conditions - HTN, HLD          PLAN:  Re-start Sinemet  1 tab TID per NG tube    PT/OT; he ambulated 15' with RW, inside his room    Neurologically stable for D/C    Please note that this note was generated using a voice recognition dictation software. Although every effort was made to ensure the accuracy of this automated transcription, some errors in transcription may have occurred.

## 2022-05-26 NOTE — ACP (ADVANCE CARE PLANNING)
Advance Care Planning     Advance Care Planning (ACP) Physician/NP/PA (Provider) Conversation      Date of ACP Conversation: 5/15/2022    Conversation Conducted with:   Patient seems to have difficulty in comprehending things    Health Care Decision Maker:    Current Designated [place]:   Patient is single, has no children, parents are , has 2 siblings 1 brother Angelica Burnham and 1 sister who also is  hence patient's brother Angelica Burnham is the only sibling that patient has  Angelica Burnham ([phone]) patient's only brother is patient's legal spokesperson as per [facility]      Care Preferences:    Hospitalization: \"If your health worsens and it becomes clear that your chance of recovery is unlikely, what would your preference be regarding hospitalization? \"  Currently hospitalized    Ventilation: \"If you were in your present state of health and suddenly became very ill and were unable to breathe on your own, what would your preference be about the use of a ventilator (breathing machine) if it was available to you? \"    Full code    \"If your health worsens and it becomes clear that your chance of recovery is unlikely, what would your preference be about the use of a ventilator (breathing machine) if it was available to you? \"   Full code    Resuscitation:  \"CPR works best to restart the heart when there is a sudden event, like a heart attack, in someone who is otherwise healthy. Unfortunately, CPR does not typically restart the heart for people who have serious health conditions or who are very sick. \"    \"In the event your heart stopped as a result of an underlying serious health condition, would you want attempts to be made to restart your heart (answer \"yes\" for attempt to resuscitate) or would you prefer a natural death (answer \"no\" for do not attempt to resuscitate)? \"   Full code    Conversation Outcomes / Follow-Up Plan:     Patient is single, has no children, parents are , has 2 siblings 1 brother Easton Sen and 1 sister who also is  hence patient's brother Easton Sen is the only sibling that patient has  Lawerance Francy ([phone]) patient's only brother is patient's legal spokesperson as per [place]    Full code      Length of Voluntary ACP Conversation in minutes:  12 minutes       Maddy Millan MD

## 2022-05-26 NOTE — PROGRESS NOTES
Speech Language Pathology  Speech Language Pathology  [facility]    Dysphagia Treatment Note    Date: 5/26/2022  Patients Name: Yudelka Rosas  MRN: [id number]  Diagnosis:   Patient Active Problem List   Diagnosis Code    Weakness R53.1    Parkinson's disease ([facility] [address] G20    COVID-19 U07.1    Cervical myelopathy ([facility] [address] G95.9    Hypertension I10    Iron deficiency anemia D50.9    Moderate protein-calorie malnutrition ([facility] [address] E44.0    Community acquired bilateral lower lobe pneumonia J18.9     Pain: 0    Dysphagia Treatment  Treatment time: 7778-6770     Subjective: [] Alert [x] Cooperative     [] Confused     [] Agitated    [x] Lethargic    Objective/Assessment:    Pt. Seen for O/M treatment program for dysphagia. Per most recent MBSS on 5/24/22,  recommended pt NPO w/ alternative means of nutrition. Pt. Completed O/M exercises X5-10 X1 sets with mod verbal cues and repetitions (as pt [place]). Pt. Completed josh maneuver X0 reps with max verbal and visual cues. Pt able to complete lingual protrusion and resistance exercises, however states \"I can't do anymore,\" and \"that took it out of me,\" for additional exercises. Pt benefits from frequent breaks and encouragement t/o session. Session d/c early as pt requesting to rest d/t fatigue. Education provided and exercises left at bedside. Note pt w/ nonproductive, wet cough t/o session. Plan:  [x] Continue  services    [] Discharge from :      Discharge recommendations: []  Further therapy recommended at discharge. The patient should be able to tolerate at least 3 hours of therapy per day over 5 days or 15 hours over 7 days. [x] Further therapy recommended at discharge. [] No therapy recommended at discharge.      Treatment completed by: Ronak Gallardo, SLP

## 2022-05-26 NOTE — PROGRESS NOTES
Physical Therapy  Facility/Department: [address] STEPDOWN  Physical Therapy Daily Treatment Note    Name: Dayron Pretty  : 1945  MRN: [id number]  Date of Service: 2022    Discharge Recommendations:  Patient would benefit from continued therapy after discharge   PT Equipment Recommendations  Equipment Needed: Yes  Mobility Devices: Mayme Dynes: Rolling      Patient Diagnosis(es): The primary encounter diagnosis was Syncope and collapse. A diagnosis of Thrombocytopenia (HCC) was also pertinent to this visit. Past Medical History:  has no past medical history on file. Past Surgical History:  has no past surgical history on file. Assessment   Body Structures, Functions, Activity Limitations Requiring Skilled Therapeutic Intervention: Decreased functional mobility ; Decreased strength;Decreased safe awareness;Decreased cognition;Decreased endurance;Decreased balance;Decreased fine motor control;Decreased coordination;Decreased posture  Assessment: Pt required modA for bed mobility and was able to sit EOB with CGA. Pt stood and ambulated ~15ft with use of RW and Lizzy. Pt limited by decreased strength and hx or parkinsons. Would recommend continued PT to address further balance and strength deficits and improve overall functional independence. Therapy Prognosis: Fair  Barriers to Learning: cognition, hearing  Requires PT Follow-Up: Yes  Activity Tolerance  Activity Tolerance: Patient limited by endurance; Patient limited by fatigue;Treatment limited secondary to decreased cognition     Plan   Plan  Plan:  (5-6x/week)  Current Treatment Recommendations: Strengthening,Balance training,ROM,Functional mobility training,Transfer training,Endurance training,Therapeutic activities,Safety education & training,Equipment evaluation, education, & procurement,Gait training  Safety Devices  Type of Devices: Call light within reach,Gait belt,Nurse notified,Chair alarm in place,Left in chair  Restraints  Restraints Initially in Place: No     Restrictions  Restrictions/Precautions  Restrictions/Precautions: Fall Risk,Up as Tolerated (droplet plus isolation.)  Required Braces or Orthoses?: No  Position Activity Restriction  Other position/activity restrictions: hx of Parkinson's, COVID+, NG tube     Subjective   General  Chart Reviewed: Yes  Patient assessed for rehabilitation services?: Yes  Response To Previous Treatment: Patient with no complaints from previous session. Family / Caregiver Present: No  Follows Commands: Impaired (requires redirection and repetition.)  General Comment  Comments: Pt retired to chair at end of session with call light in reach. Subjective  Subjective: Pt and RN agreeable to PT this morning. Pt supine in bed upon arrival with no c/o pain. Pt pleasant and cooperative throughout session although very [place]. Cognition   Orientation  Overall Orientation Status: Within Functional Limits  Orientation Level: Oriented X4  Cognition  Overall Cognitive Status: Exceptions  Arousal/Alertness: Delayed responses to stimuli  Following Commands: Follows multistep commands with increased time; Follows multistep commands with repitition  Attention Span: Attends with cues to redirect  Memory: Decreased recall of recent events  Safety Judgement: Decreased awareness of need for assistance;Decreased awareness of need for safety  Problem Solving: Assistance required to generate solutions;Assistance required to correct errors made  Insights: Decreased awareness of deficits  Initiation: Requires cues for some  Sequencing: Requires cues for some     Objective   Bed mobility  Supine to Sit: Moderate assistance (for trunk and LE progression.)  Sit to Supine: Unable to assess  Scooting: Minimal assistance  Bed Mobility Comments: HOB elevated, required assistance with trunk and LE progression throughout.   Transfers  Sit to Stand: Contact guard assistance  Stand to sit: Contact guard assistance  Bed to Chair: Contact guard assistance  Comment: RW used for STS transfer, cueing for hand placement with poor return demo. Pt performed STS transfers x2 from bed in lowest position. Ambulation  Surface: level tile  Device: Rolling Walker  Other Apparatus: O2 (2L)  Assistance: Minimal assistance  Quality of Gait: B knees flexed, forward flexed posture. Gait Deviations: Slow Yoselin;Decreased step length;Shuffles  Distance: ~15ft within room  Comments: Pt overall slow and steady with no noted LOB. More Ambulation?: No  Stairs/Curb  Stairs?: No     Balance  Posture: Fair  Sitting - Static: Fair;+  Sitting - Dynamic: Fair;-  Standing - Static: Fair  Standing - Dynamic: Poor  Comments: RW used to assess standing balance. Pt able to sit EOB with CGA. Exercise Treatment:  Seated LE exercise program: Long Arc Quads, hip abduction/adduction, heel/toe raises, and marches. Reps: x10 each BLE  Comments: Pt required max cueing for correct technique and sequencing. AM-PAC Score  AM-PAC Inpatient Mobility Raw Score : 16 (05/26/22 1305)  -PAC Inpatient T-Scale Score : 40.78 (05/26/22 1305)  Mobility Inpatient CMS 0-100% Score: 54.16 (05/26/22 1305)  Mobility Inpatient CMS G-Code Modifier : CK (05/26/22 1305)          Goals  Short Term Goals  Time Frame for Short term goals: 14 visits  Short term goal 1: Perform bed mobility with Min A  Short term goal 2: Perform sit to stand tranfers with Min A  Short term goal 3: Ambulate 200ft with RW and CGA  Short term goal 4: Demo Fair dynamic standing balance to decrease risk of falls  Short term goal 5: Participate in 30 minutes of therapy to demo increased enduranc  Additional Goals?: No       Education  Patient Education  Education Given To: Patient  Education Provided: Role of Therapy;Plan of Care;Transfer Training;Home Exercise Program  Education Method: Verbal  Barriers to Learning: Cognition; Hearing  Education Outcome: Verbalized understanding;Continued education needed      Therapy Time   Individual Concurrent Group Co-treatment   Time In 1038         Time Out 1119         Minutes 41         Timed Code Treatment Minutes: [address], PTA

## 2022-05-26 NOTE — CARE COORDINATION
Payam Barroso Quality Flow/Interdisciplinary Rounds Progress Note    Quality Flow Rounds held on May 26, 2022 at [address] Attending:  Bedside Nurse, ,  and Nursing Unit Leadership    Barriers to Discharge: Nutrition plan    Anticipated Discharge Date:       Anticipated Discharge Disposition:    Readmission Risk              Risk of Unplanned Readmission:  15           Discussed patient goal for the day, patient clinical progression, and barriers to discharge. The following Goal(s) of the Day/Commitment(s) have been identified:      Spoke with MD and I told him there needs to  Be a plan for nutrition before SNF will take. He said they are going to repeat the swallow study in a week. Called Mor Gannon at [facility] and updated. She will talk to her DON and call me back. [id number] calls back and they need to know if he tolerates TF overnight and also to make sure their is no change in mentation. KATHY MARI and given update.     Jimena Osorio, RN  May 26, 2022

## 2022-05-26 NOTE — PROGRESS NOTES
Comprehensive Nutrition Assessment    Type and Reason for Visit:  Reassess    Nutrition Recommendations/Plan:   1. Continue NPO  2. Recommend Standard without Fiber at 50 mL/hr to provide 1440 kcal, 67 g pro, 1164 mL free water  3. Monitor electrolytes (Na, K, Mg, Phosphorus), as pt has had poor PO intake/no PO intake since admission (11 days)  4. Monitor labs, weight, plan of care      Malnutrition Assessment:  Malnutrition Status: Moderate malnutrition (to severe) (05/16/22 1323)    Context:  Chronic Illness     Findings of the 6 clinical characteristics of malnutrition:  Energy Intake:  Unable to assess  Weight Loss:  Unable to assess     Body Fat Loss:  Mild body fat loss (to moderate) Buccal region,Triceps   Muscle Mass Loss:  Severe muscle mass loss Clavicles (pectoralis & deltoids)  Fluid Accumulation:  No significant fluid accumulation     Strength:  Not Performed    Nutrition Assessment:    Consulted for TF ordering and management. Chart reviewed. Pt positive for COVID-19 infection. Pt NPO since 5/23 d/t SLP recommendations - NPO with alternate means of nutrition. Poor intake/refusal of oral intake since admission (11 days). RN reports NGT placed this morning. Wts reviewed - will continue to monitor, unsure if wt changes are r/t fluid. Nutrition Related Findings:    Labs reviewed: K 3.6 mmol/L. Cl 108 mmol/L. Meds reviewed: dextrose 5 % and 0.9 % sodium chloride infusion. LBM recorded 5/21. BLE edema. Wound Type: None       Current Nutrition Intake & Therapies:    Average Meal Intake: NPO  Average Supplements Intake: NPO  Additional Calorie Sources:  · dextrose 5 % and 0.9 % sodium chloride at 75 mL/hr = 306 kcal      Anthropometric Measures:  Height: 5' 9.02\" (175.3 cm)  Ideal Body Weight (IBW): 160 lbs (73 kg)    Admission Body Weight: 113 lb 9.6 oz (51.5 kg)  Current Body Weight: 137 lb (62.1 kg),   IBW.  Weight Source: Bed Scale  Current BMI (kg/m2): 20.8        Weight Adjustment For: No Adjustment                 BMI Categories: Underweight (BMI less than 22) age over 72    Estimated Daily Nutrient Needs:  Energy Requirements Based On: Kcal/kg  Weight Used for Energy Requirements:  Current   Energy (kcal/day): 1800 kcals/day (30 kcal/kg); (1500 kcals/day based off admit weight 51 kg)  Weight Used for Protein Requirements: Current  Protein (g/day): 90 g/day (1.5 g/kg); (75 g/day based off admit weight 51 kg)  Method Used for Fluid Requirements: 1 ml/kcal (25-28)  Fluid (ml/day): 0339-5360 mL/day or per MD    Nutrition Diagnosis:   · Inadequate oral intake related to swallowing difficulty,impaired respiratory function as evidenced by NPO or clear liquid status due to medical condition, nutrition support - enteral nutrition      Nutrition Interventions:   Food and/or Nutrient Delivery: Continue NPO  Nutrition Education/Counseling: No recommendation at this time  Coordination of Nutrition Care: Continue to monitor while inpatient       Goals:  Previous Goal Met: No Progress toward Goal(s)  Goals: Meet at least 75% of estimated needs,prior to discharge       Nutrition Monitoring and Evaluation:   Behavioral-Environmental Outcomes: None Identified  Food/Nutrient Intake Outcomes: Enteral Nutrition Intake/Tolerance  Physical Signs/Symptoms Outcomes: Biochemical Data,Nutrition Focused Physical Findings,Weight,Chewing or Swallowing    Discharge Planning:     Too soon to determine     Marlin Tang [address]  Contact: [phone]

## 2022-05-26 NOTE — PROGRESS NOTES
Writing nurse let Dr. Wanda Gonzales know that the NG tubes is in optimal position, sent him the results of the last Abdominal X-ray. He relates he will put in an order for nutrition and OK to use for medications.

## 2022-05-26 NOTE — PROGRESS NOTES
[facility] -   Infectious diseases evaluation  admission date 5/15/2022    reason for consultation:   covid     Impression :   Current:  · Syncope  · COVID, mild illness  · Parkinson  · KALYAN   · CRP elevation  · Dysphagia - esoph swall study +  · Bibasilar opacities, possile aspiration    Other:  ·   Discussion / summary of stay / plan of care   ·   Recommendations   · remdesevir 5 days 5/17 -5/21 -  · Pull left iv  - site red - start doxy - stop 5/23  · spirometer to use fr atelectasis  · + swal study and increase o2 requirements, bilat LL opacities, start zosyn  5/25 -5/30  ·     Infection Control Recommendations   · Universal Precautions  · Droplet plus Isolation      Antimicrobial Stewardship Recommendations   · Simplification of therapy  · Targeted therapy    History of Present Illness:   Initial history:  Myla Connor is a 77y.o.-year-old male who presented to the hospital 5/15 with a syncopal episode, while he was sitting in his wheelchair. No seizure activity  Was having some cough and  COVID-positive tested before he came in. CXR neg  CT chest neg  - CT AP L3 vertebral body multiple round lesions.     pt does have some underlying and uses a wheelchair  Past etoh++    Pt is on RAand very poor historian    Interval changes  5/25/2022   Patient Vitals for the past 8 hrs:   BP Temp Temp src Pulse Resp SpO2   05/25/22 2111 -- -- -- -- 20 100 %   05/25/22 1955 (!) 172/57 98.2 °F (36.8 °C) Oral 78 23 95 %   05/25/22 1556 -- -- -- -- 18 99 %   05/25/22 1548 (!) 169/98 98.2 °F (36.8 °C) Oral 83 25 100 %       congested and wet cough - speech worse-  Seems sluggish and more tired [place] before  repeat CXR w R atelectasis  And elevated R adrianne diaphragm  No fever    CRP stalling 27  Failed swall study 5/24  Refusing NGT and family revisiting is wishes    Starting AB for concern of aspiration pneumonia as per CXR 5/24 5/23  cough and congestion are both better  Labs still benign  Ok for DC   Disc w medicine  Speech stays sluggish  Weak and cerv mylopathy -NS called -outpt FU    5/24  swall study + and CXR stable Bilat LL infilt,   No fever and no clear signs of aspiration  But chest rattling and on 3 L NC , concerning for a new event   Get CRP  looking for new inflammation      Summary of relevant labs:  Labs:  CRP 12 - 10 - 28 - 31  Creat 1.27  WBC 4.4 - 4.8    Micro:  BC 5/17  U cx 5/17    Imaging:  CXR 5/24  Stable bibasilar parenchymal opacities and bilat pleural effusions    CXR 5/20  Elevation of the right hemidiaphragm with right basilar airspace opacities. Stable cardiomediastinal silhouette. Left lung is clear. No pneumothorax. No definite pleural effusion       CT chest and AP  neg for pneumonia   Upper Abdomen: Cholelithiasis and free fluid within peritoneal cavity which may be secondary to ascites. I have personally reviewed the past medical history, past surgical history, medications, social history, and family history, and I haveupdated the database accordingly. Allergies:   Patient has no known allergies. Review of Systems:     Review of Systems   Constitutional: Positive for activity change. Negative for chills and diaphoresis. HENT: Negative for congestion. Eyes: Negative for photophobia and discharge. Respiratory: Negative for apnea and cough. Cardiovascular: Negative for chest pain. Gastrointestinal: Negative for abdominal distention. Endocrine: Negative for heat intolerance and polyphagia. Genitourinary: Negative for dysuria. Musculoskeletal: Negative for arthralgias. Skin: Negative for color change. Allergic/Immunologic: Negative for immunocompromised state. Neurological: Negative for dizziness, tremors, seizures, syncope, light-headedness, numbness and headaches. Hematological: Negative for adenopathy. Psychiatric/Behavioral: Negative for agitation.        Physical Examination :       Physical Exam  Constitutional:       General: He is not in acute distress. Appearance: Normal appearance. He is not ill-appearing, toxic-appearing or diaphoretic. HENT:      Head: Normocephalic and atraumatic. Nose: Nose normal.      Mouth/Throat:      Mouth: Mucous membranes are moist.   Eyes:      General: No scleral icterus. Conjunctiva/sclera: Conjunctivae normal.      Pupils: Pupils are equal, round, and reactive to light. Cardiovascular:      Rate and Rhythm: Normal rate and regular rhythm. Heart sounds: Normal heart sounds. No murmur heard. Pulmonary:      Effort: No respiratory distress. Breath sounds: Normal breath sounds. No stridor. Abdominal:      General: There is no distension. Palpations: Abdomen is soft. There is no mass. Genitourinary:     Comments: No broussard  Musculoskeletal:         General: No swelling, tenderness, deformity or signs of injury. Cervical back: Neck supple. No rigidity or tenderness. Right lower leg: No edema. Left lower leg: No edema. Skin:     General: Skin is dry. Coloration: Skin is not jaundiced. Neurological:      General: No focal deficit present. Mental Status: He is alert and oriented to person, place, and time. Psychiatric:         Mood and Affect: Mood normal.         Thought Content: Thought content normal.         Past Medical History:   No past medical history on file. Past Surgical  History:   No past surgical history on file.     Medications:      ipratropium-albuterol  1 ampule Inhalation Q4H WA    piperacillin-tazobactam  3,375 mg IntraVENous Q8H    [START ON 5/26/2022] amLODIPine  10 mg Oral Daily    ferrous sulfate  325 mg Oral Daily with breakfast    enoxaparin  40 mg SubCUTAneous Daily    vitamin D  50,000 Units Oral Weekly    carbidopa-levodopa  1 tablet Oral TID    sodium chloride flush  5-40 mL IntraVENous 2 times per day       Social History:     Social History     Socioeconomic History    Marital status: Single     Spouse name: Not on file    Number of children: Not on file    Years of education: Not on file    Highest education level: Not on file   Occupational History    Not on file   Tobacco Use    Smoking status: Not on file    Smokeless tobacco: Not on file   Substance and Sexual Activity    Alcohol use: Not on file    Drug use: Not on file    Sexual activity: Not on file   Other Topics Concern    Not on file   Social History Narrative    Not on file     Social Determinants of Health     Financial Resource Strain:     Difficulty of Paying Living Expenses: Not on file   Food Insecurity:     Worried About Running Out of Food in the Last Year: Not on file    Ashley of Food in the Last Year: Not on file   Transportation Needs:     Lack of Transportation (Medical): Not on file    Lack of Transportation (Non-Medical): Not on file   Physical Activity:     Days of Exercise per Week: Not on file    Minutes of Exercise per Session: Not on file   Stress:     Feeling of Stress : Not on file   Social Connections:     Frequency of Communication with Friends and Family: Not on file    Frequency of Social Gatherings with Friends and Family: Not on file    Attends [employer] Services: Not on file    Active Member of [address] [place] [address] [employer] or Organizations: Not on file    Attends Club or Organization Meetings: Not on file    Marital Status: Not on file   Intimate Partner Violence:     Fear of Current or Ex-Partner: Not on file    Emotionally Abused: Not on file    Physically Abused: Not on file    Sexually Abused: Not on file   Housing Stability:     Unable to Pay for Housing in the Last Year: Not on file    Number of [place] in the Last Year: Not on file    Unstable Housing in the Last Year: Not on file       Family History:   No family history on file.    Medical Decision Making:   I have independently reviewed/ordered the following labs:    CBC with Differential:   Recent Labs 05/24/22  0554 05/25/22  0559   WBC 10.2 12.1*   HGB 10.2* 9.9*   HCT 30.6* 29.4*    175   LYMPHOPCT 6* 7*   MONOPCT 7 7     BMP:  Recent Labs     05/23/22  0709 05/23/22  0709 05/24/22  0554 05/25/22  0559   *   < > 143 142   K 3.5*   < > 3.6* 3.4*      < > 112* 111*   CO2 21   < > 22 21   BUN 13   < > 10 8   CREATININE 0.77   < > 0.82 0.72   MG 1.6  --   --  1.7    < > = values in this interval not displayed. Hepatic Function Panel:   No results for input(s): PROT, LABALBU, BILIDIR, IBILI, BILITOT, ALKPHOS, ALT, AST in the last 72 hours. No results for input(s): RPR in the last 72 hours. No results for input(s): HIV in the last 72 hours. No results for input(s): BC in the last 72 hours. Lab Results   Component Value Date    CREATININE 0.72 05/25/2022    GLUCOSE 103 05/25/2022       Detailed results: Thank you for allowing us to participate in the care of this patient. Please call with questions. This note is created with the assistance of a speech recognition program.  While intending to generate adocument that actually reflects the content of the visit, the document can still have some errors including those of syntax and sound a like substitutions which may escape proof reading. It such instances, actual meaningcan be extrapolated by contextual diversion.     Aly Reddy MD  Office: [phone]  Perfect serve / office [phone]

## 2022-05-26 NOTE — CONSULTS
Palliative Care Inpatient Consult    NAME:  Jennifer Ascencio  MEDICAL RECORD NUMBER:  [id number]  AGE: 77 y.o.    GENDER: male  : 1945  TODAY'S DATE:  2022    Reasons for Consultation:    Symptom and/or pain management  Provision of information regarding PC and/or hospice philosophies  Complex, time-intensive communication and interdisciplinary psychosocial support  Clarification of goals of care and/or assistance with difficult decision-making  Guidance in regards to resources and transition(s)    Members of PC team contributing to this consultation are :  Dr. Fabricio Edouard palliative care attending  Plan      Palliative Interaction:  The patient was seen today, was sitting comfortably in the recliner alert, awake, and in no acute distress  Patient had difficulty in following commands and understanding and the questions had to be repeated to make him understand  I told the patient that I would be talking to his brother Benny Granados and patient had difficulty even understanding that  I called patient's brother Benny Granados at [phone]  I introduced myself to Benny Granados and explained him the role of palliative care and told him that palliative care is an extra layer of support and strength for the patient and family  Benny Granados told that patient is single, has no children, parents are  and he has 2 siblings 1 him-Karthikeyan and 1 sister who also is   I explained to Benny Granados that since he is the only living sibling the patient has thus he is patient's legal spokesperson for making the healthcare decisions  Benny Granados said that he and the patient along with their cousin live in their parents home who are both   I discussed patient's current medical conditions with Benny Granados and he told that patient had passed out and would not wake up and thus they called EMS and patient was brought here  I asked Benny Granados if patient has been treated for Parkinson's disease and Benny Granados told patient has not received any treatment for his Parkinson's disease  I also told Yancey Severs that patient has failed swallow study and has an NG tube placed    I explained the different types of codes to Yancey Severs and it seemed that Karthikeyan's was not ready yet to make the decision, I told him that he can think regarding these codes and further discussions can be done again and he agreed  I told Yancey Severs that patient is also COVID-positive and if anyone else had COVID and he told he does not have any COVID  I offered comfort and emotional support to the patient and family    Education/support to staff  Education/support to family  Education/support to patient  Discharge planning/helping to coordinate care  Communications with primary service  Caregiver support/education  Code status clarified: Full Code  Code status clarified: [facility]  Code status clarified: [facility]  Other major issues      History of Present Illness     The patient is a 77 y.o. Non- / non  male who presents with Loss of Consciousness      Referred to Palliative Care by   [x] Physician   [] Nursing  [] Family Request   [] Other:       He was admitted to the internal medicine service for Syncope and collapse [R55]  Weakness [R53.1]. His hospital course has been associated with Weakness. The patient has a complicated medical history and has been hospitalized since 5/15/2022  3:39 PM.  The history has been obtained from patient's records. Patient was brought in by the EMS after he had an episode of syncope. Patient has history of untreated Parkinson's disease, has dysautonomia with orthostatic hypotension, history of hypertension and hyperlipidemia. Patient was noted to have thrombocytopenia, iron deficiency anemia and significant cervical spondylolysis/stenosis with cervical myelopathy. As per records patient had been significant drinker of alcohol but had not been drinking for several weeks. Patient's brother and cousin are his caretakers. Patient CT head was negative for any acute abnormality.   CT lumbar spine showed multiple hypodense lesions throughout the lumbar spine most pronounced at L3. MRI spine showed moderate spinal canal stenosis and severe bilateral neural foraminal narrowing at C3-C5 and mild spinal canal stenosis from C5- C6 to C7-T1, severe bilateral neural foraminal narrowing at C6-C7 and C7-T1. Patient also tested positive for COVID. ID, neurology, neurosurgery and cardiology have been consulted. Palliative care consulted for goals of care. Active Hospital Problems    Diagnosis Date Noted    Community acquired bilateral lower lobe pneumonia [J18.9]      Priority: Medium    Moderate protein-calorie malnutrition ([address] [E44.0] 05/23/2022     Priority: Medium    Thrombocytopenia ([address] [D69.6] 05/20/2022     Priority: Medium    Iron deficiency anemia [D50.9] 05/18/2022     Priority: Medium    COVID-19 [U07.1] 05/17/2022     Priority: Medium    Cervical myelopathy ([address] [G95.9] 05/17/2022     Priority: Medium    Parkinson's disease ([address] [G20]      Priority: Medium       PAST MEDICAL HISTORY  No past medical history on file. PAST SURGICAL HISTORY  No past surgical history on file.     SOCIAL HISTORY  Social History     Tobacco Use    Smoking status: Not on file    Smokeless tobacco: Not on file   Substance Use Topics    Alcohol use: Not on file    Drug use: Not on file       ALLERGIES  No Known Allergies      MEDICATIONS  Current Medications    ipratropium-albuterol  1 ampule Inhalation Q4H WA    piperacillin-tazobactam  3,375 mg IntraVENous Q8H    amLODIPine  10 mg Oral Daily    ferrous sulfate  325 mg Oral Daily with breakfast    enoxaparin  40 mg SubCUTAneous Daily    vitamin D  50,000 Units Oral Weekly    carbidopa-levodopa  1 tablet Oral TID    sodium chloride flush  5-40 mL IntraVENous 2 times per day     hydrALAZINE, albuterol, labetalol, sennosides-docusate sodium, sodium chloride, glucose, dextrose bolus **OR** dextrose bolus, glucagon (rDNA), dextrose, sodium vomiting,abdominal pain, diarrhea or constipation, no melena   Genitourinary: no dysuria, frequency, hematuria, or nocturia   Musculoskeletal: no myalgias or arthralgias, no back pain   Skin: no rashes or sores   Neurological: no focal weakness, numbness, tingling or headache, no seizures    PHYSICAL ASSESSMENT:  Constitutional: Alert and oriented to self, has difficulty in comprehending and following commands, very poor dentition  Head: Normocephalic and atraumatic  Eyes: EOM are normal  Neck: Normal range of motion. Neck supple  Cardiovascular: Normal rate and regular rhythm  Pulmonary/Chest: Effort normal and breath sounds normal  Abdomen: not distended, no ascites  Musculoskeletal: Normal range of motion, no edema lower ext. Neurological: Alert, awake, has difficulty in comprehending  Skin: Normal turgor, no bleeding, no bruising. Palliative Performance Scale:  ___60%  Ambulation reduced; Significant disease; Can't do hobbies/housework; intake normal or reduced; occasional assist; LOC full/confusion  _x_50%  Mainly sit/lie; Extensive disease; Can't do any work; Considerable assist; intake normal or reduced; LOC full/confusion  ___40%  Mainly in bed; Extensive disease; Mainly assist; intake normal or reduced; LOC full/confusion   ___30%  Bed Bound; Extensive disease; Total care; intake reduced; LOC full/confusion  ___20%  Bed Bound; Extensive disease; Total care; intake minimal; Drowsy/coma  ___10%  Bed Bound; Extensive disease;  Total care; Mouth care only; Drowsy/coma  ___0       Death      Principle Problem/Diagnosis:  Weakness    Additional Assessments:   Principal Problem:    Parkinson's disease ([facility]  Active Problems:    Weakness    COVID-19    Cervical myelopathy (HCC)    Hypertension    Iron deficiency anemia    Moderate protein-calorie malnutrition ([facility]    Community acquired bilateral lower lobe pneumonia  Resolved Problems:    Syncope and collapse    Hypotension    KALYAN (acute kidney injury) ([facility] Orthostatic hypertension    Thrombocytopenia (HCC)    CRP elevated      1- Symptom management/ pain control     Pain Assessment:  Pain is controlled with current analgesics. Medication(s) being used: acetaminophen. Anxiety:  none                          Dyspnea:  none                          Fatigue:  none      We feel the patient symptoms are being controlled. his current regimen is reviewed by myself and discussed with the staff. We will continue to follow-up with the family for further goals of care and CODE STATUS discussion  We will continue to provide comfort and support to the patient and family    2- Goals of care evaluation   The patient goals of care are improve or maintain function/quality of life, accomplish a particular personal goal, spiritual needs, strengthening relationships and preserve independence/autonomy/control   Goals of care discussed with:    [] Patient independently    [] Patient and Family    [x] Family or Healthcare DPOA independently    [] Unable to discuss with patient, family/DPOA not present    3- Code Status  Full Code    4- Other recommendations   - We will continue to provide comfort and support to the patient and the family  Please call with any palliative questions or concerns. Palliative Care Team is available via perfect serve or via phone. Palliative Care will continue to follow Mr. Helen miller as needed. Thank you for allowing Palliative Care to participate in the care of Mr. Etienne Miller . This note has been dictated by dragon, typing errors may be a possibility.     The total time I spent in seeing the patient, discussing goals of care, advanced directives, code status, greater than 50% time in counseling and other major issues was more than 60 minutes      Electronically signed by   Manpreet Brown MD  Palliative Care Team  on 5/26/2022 at 9:08 AM    Palliative care office: [phone]

## 2022-05-26 NOTE — PROGRESS NOTES
[facility]  Internal Medicine Teaching Residency Program  Inpatient Daily Progress Note  ______________________________________________________________________________    Patient: Saul Alvarez  YOB: 1945   MARTHA:[id number]    Acct: [de-identified]     Room: [address] [place] [address]  Admit date: 5/15/2022  Today's date: 05/26/22  Number of days in the hospital: 11    SUBJECTIVE   Admitting Diagnosis: Parkinson's disease ([facility]  CC: syncopal episode, weakness  Pt examined at bedside. Chart & results reviewed. Patient is weak, with B/L upper and lower extremity coarse tremors  Has difficulty clearing his secretions due to oropharyngeal muscle weakness  AAO x3, afebrile, on NC 1.5 L  Hypertensive overnight  Agreeable to putting NG this time  Consult palliative care regarding plane of care discussion      ROS:  Constitutional:  negative for chills, fevers, sweats  Respiratory: positive for cough, dyspnea on exertion, hemoptysis, shortness of breath, wheezing  Cardiovascular:  negative for chest pain, chest pressure/discomfort, lower extremity edema, palpitations  Gastrointestinal:  negative for abdominal pain, constipation, diarrhea, nausea, vomiting  Neurological:  negative for dizziness, headache  BRIEF HISTORY   The patient is a pleasant 76 y. o. male with past medical history signifiacnt for parkinsonism disease, hypertension and hyperlipidemia presents through EMS with chief complaint of syncope that occurred just prior to arrival.  History was mostly provided by the caretaker the ER resident.      Patient is admitted for syncopal episode work-up.  Likely multifactorial due to dysautonomia, orthostatic hypotension, neurocardiogenic, neurogenic due to cervical spinal stenosis and canal narrowing.  Cardiology consulted for ruling out cardiogenic causes.  Plan for echo.     Dehydration secondary to decreased oral intake causing KALYAN, low blood pressure, orthostatic hypotension. COVID-positive.  ID following  Neurology: Signed off  Echo LVEF > 55%  Holter monitoring on discharge per cardio  F/u with NSG in 4 weeks. Keep NGT for nutrition. Palliative care consult. F/u on neuro recomnds      OBJECTIVE     Vital Signs:  BP (!) 151/82   Pulse 67   Temp 99.8 °F (37.7 °C) (Temporal)   Resp 19   Ht 5' 9.02\" (1.753 m)   Wt 141 lb (64 kg)   SpO2 94%   BMI 20.82 kg/m²     Temp (24hrs), Av.7 °F (37.1 °C), Min:98.2 °F (36.8 °C), Max:99.8 °F (37.7 °C)    In: 1823.7   Out: -     Physical Exam:  Constitutional: This is a well developed, well nourished, 18.5-24.9 - Normal 77y.o. year old male who is alert, oriented x3, cooperative and in no apparent distress. Head:normocephalic and atraumatic. EENT:  PERRLA. No conjunctival injections. Septum was midline, mucosa was without erythema, exudates or cobblestoning. No thrush was noted. Neck: Supple without thyromegaly. No elevated JVP. Trachea was midline. Respiratory: Chest was symmetrical without dullness to percussion. bl fine crackles more prominent on right side. There is no intercostal retraction or use of accessory muscles. No egophony noted. Cardiovascular: Regular without murmur, clicks, gallops or rubs. Abdomen: Slightly rounded and soft without organomegaly. No rebound, rigidity or guarding was appreciated. Lymphatic: No lymphadenopathy. Musculoskeletal: Normal curvature of the spine. No gross muscle weakness. Extremities:  No lower extremity edema, ulcerations, tenderness, varicosities or erythema. Muscle size, tone and strength are normal.  No involuntary movements are noted. bl upper and lower extremity tremors  Skin:  Warm and dry. Good color, turgor and pigmentation. No lesions or scars.   No cyanosis or clubbing  Neurological/Psychiatric: The patient's general behavior, level of consciousness, thought content and emotional status is normal.        Medications:  Scheduled Medications:    input(s): AST, ALT, ALB, BILIDIR, BILITOT, ALKPHOS in the last 72 hours. MICROBIOLOGY:   Lab Results   Component Value Date/Time    CULTURE NO GROWTH 5 DAYS 05/17/2022 09:05 AM       Imaging:    XR SHOULDER RIGHT (MIN 2 VIEWS)    Result Date: 5/19/2022  Moderate glenohumeral and moderate to severe acromioclavicular joint degenerative change without fracture. High riding humeral head indicative of chronic high-grade rotator cuff pathology. XR CHEST PORTABLE    Result Date: 5/24/2022  Stable bibasilar parenchymal opacities and bilateral pleural effusions. XR CHEST PORTABLE    Result Date: 5/21/2022  1. Stable mild bibasilar airspace disease and small bilateral pleural effusions. Overall, stable chest x-ray from 05/20/2022, 1333 hours. Follow-up is recommended to document resolution. 2. Stable elevation of the right hemidiaphragm. 3. Stable cardiomegaly. XR CHEST PORTABLE    Result Date: 5/20/2022  Elevation of the right hemidiaphragm with right basilar airspace opacities. These opacities could represent atelectasis or in the appropriate clinical setting pneumonia. FL MODIFIED BARIUM SWALLOW W VIDEO    Result Date: 5/24/2022  1. Penetration followed by aspiration with the nectar thick substance. 2. No pureed/pudding thick substance was swallowed into it was mixed with the nectar thick substance. 3. Remainder of the substances not attempted. Please see separate speech pathology report for full discussion of findings and recommendations.  RECOMMENDATIONS: Unavailable       ASSESSMENT & PLAN     Principal Problem:    Parkinson's disease ([address]  Active Problems:    Weakness    COVID-19    Cervical myelopathy (HCC)    Hypertension    Iron deficiency anemia    Moderate protein-calorie malnutrition ([address]    Community acquired bilateral lower lobe pneumonia  Resolved Problems:    Syncope and collapse    Hypotension    KALYAN (acute kidney injury) (HCC)    Orthostatic hypertension    Thrombocytopenia (HCC)    CRP elevated    1. Syncope and collapse.  Multifactorial.  Likely 2/2 orthostatic hypotension 2/2 dysautonomia due to Parkinson's disease/dehydration vs cervical myelopathy vs cardiogenic vs COVID infection.  MRI cervical spine shows cervical canal narrowing with myelopathy. Encourage oral intake (has poor appetite, refuses oral intake). Echo LVEF >55 %. Holter monitoring as OP. Follow up with cardio. 2. Concern for aspiration due weak laryngeal muscle due parkinson's disease vs acute neurologic event. continue on zosyn. Continue meds through NGT. Might need PEG in future. Consult palliative for Goals of care  3. Cervical myelopathy: Symptomatic. Neurosurgery evaluated. OP surgery follow up. 4. Parkinson's disease.  Continue on Sinemet 1 tablet 3 times daily. Follow up on neuro recommendations  5. COVID-19 infection, on 1.5 L via NC. concern for superimposed bacterial/ aspiration pneumonia. Continue on zosyn. Airborne precautions. S/p  Remdesivir for 5 days. follow up on ID recomnds  6. KALYAN.  Resolved  7. HTN: uncontrolled. Continue on amlodipine 10 mg daily. Hydralazine and lopressor PRN  8. Orthostatic hypotension: Resolved.   9. Chronic alcoholism.  Multivitamin and folate supplementation.     DVT ppx : lovenox  GI ppx: not needed  PT/OT: Following  Discharge Planning / SW: CM to assist with    Le Morgan MD  Internal Medicine Resident, PGY-1  [address] [place], [place]  5/26/2022, 9:31 AM Attending Physician Statement  I have discussed the care of Carol Mendoza, including pertinent history and exam findings,  with the resident. I have seen and examined the patient and the key elements of all parts of the encounter have been performed by me. I agree with the assessment, plan and orders as documented by the resident with additions . Treatment plan Discussed with nursing staff in detail , all questions answered .    Electronically signed by Sera Mora MD on   5/26/22 at 12:36 PM EDT    Please note that this chart was generated using voice recognition Dragon dictation software. Although every effort was made to ensure the accuracy of this automated transcription, some errors in transcription may have occurred.

## 2022-05-27 VITALS
HEIGHT: 69 IN | OXYGEN SATURATION: 98 % | BODY MASS INDEX: 20.29 KG/M2 | WEIGHT: 137 LBS | HEART RATE: 71 BPM | SYSTOLIC BLOOD PRESSURE: 149 MMHG | RESPIRATION RATE: 19 BRPM | TEMPERATURE: 98.1 F | DIASTOLIC BLOOD PRESSURE: 85 MMHG

## 2022-05-27 LAB
ABSOLUTE EOS #: 0.16 K/UL (ref 0–0.44)
ABSOLUTE IMMATURE GRANULOCYTE: 0.06 K/UL (ref 0–0.3)
ABSOLUTE LYMPH #: 0.8 K/UL (ref 1.1–3.7)
ABSOLUTE MONO #: 0.44 K/UL (ref 0.1–1.2)
ANION GAP SERPL CALCULATED.3IONS-SCNC: 7 MMOL/L (ref 9–17)
BASOPHILS # BLD: 1 % (ref 0–2)
BASOPHILS ABSOLUTE: 0.04 K/UL (ref 0–0.2)
BUN BLDV-MCNC: 10 MG/DL (ref 8–23)
CALCIUM SERPL-MCNC: 7 MG/DL (ref 8.6–10.4)
CHLORIDE BLD-SCNC: 109 MMOL/L (ref 98–107)
CO2: 23 MMOL/L (ref 20–31)
CREAT SERPL-MCNC: 0.84 MG/DL (ref 0.7–1.2)
EOSINOPHILS RELATIVE PERCENT: 2 % (ref 1–4)
GFR AFRICAN AMERICAN: >60 ML/MIN
GFR NON-AFRICAN AMERICAN: >60 ML/MIN
GFR SERPL CREATININE-BSD FRML MDRD: ABNORMAL ML/MIN/{1.73_M2}
GLUCOSE BLD-MCNC: 96 MG/DL (ref 70–99)
HCT VFR BLD CALC: 25.9 % (ref 40.7–50.3)
HEMOGLOBIN: 8.5 G/DL (ref 13–17)
IMMATURE GRANULOCYTES: 1 %
LYMPHOCYTES # BLD: 12 % (ref 24–43)
MCH RBC QN AUTO: 30.4 PG (ref 25.2–33.5)
MCHC RBC AUTO-ENTMCNC: 32.8 G/DL (ref 28.4–34.8)
MCV RBC AUTO: 92.5 FL (ref 82.6–102.9)
MONOCYTES # BLD: 7 % (ref 3–12)
NRBC AUTOMATED: 0 PER 100 WBC
PDW BLD-RTO: 15 % (ref 11.8–14.4)
PLATELET # BLD: 155 K/UL (ref 138–453)
PMV BLD AUTO: 11 FL (ref 8.1–13.5)
POTASSIUM SERPL-SCNC: 3.9 MMOL/L (ref 3.7–5.3)
RBC # BLD: 2.8 M/UL (ref 4.21–5.77)
RBC # BLD: ABNORMAL 10*6/UL
SEG NEUTROPHILS: 77 % (ref 36–65)
SEGMENTED NEUTROPHILS ABSOLUTE COUNT: 5.08 K/UL (ref 1.5–8.1)
SODIUM BLD-SCNC: 139 MMOL/L (ref 135–144)
WBC # BLD: 6.6 K/UL (ref 3.5–11.3)

## 2022-05-27 PROCEDURE — 6360000002 HC RX W HCPCS: Performed by: INTERNAL MEDICINE

## 2022-05-27 PROCEDURE — 85025 COMPLETE CBC W/AUTO DIFF WBC: CPT

## 2022-05-27 PROCEDURE — 92526 ORAL FUNCTION THERAPY: CPT

## 2022-05-27 PROCEDURE — 80048 BASIC METABOLIC PNL TOTAL CA: CPT

## 2022-05-27 PROCEDURE — 6360000002 HC RX W HCPCS: Performed by: STUDENT IN AN ORGANIZED HEALTH CARE EDUCATION/TRAINING PROGRAM

## 2022-05-27 PROCEDURE — 6370000000 HC RX 637 (ALT 250 FOR IP): Performed by: STUDENT IN AN ORGANIZED HEALTH CARE EDUCATION/TRAINING PROGRAM

## 2022-05-27 PROCEDURE — 2580000003 HC RX 258: Performed by: INTERNAL MEDICINE

## 2022-05-27 PROCEDURE — 36415 COLL VENOUS BLD VENIPUNCTURE: CPT

## 2022-05-27 PROCEDURE — 2580000003 HC RX 258

## 2022-05-27 PROCEDURE — 6370000000 HC RX 637 (ALT 250 FOR IP)

## 2022-05-27 RX ADMIN — AMLODIPINE BESYLATE 10 MG: 10 TABLET ORAL at 08:49

## 2022-05-27 RX ADMIN — SODIUM CHLORIDE, PRESERVATIVE FREE 10 ML: 5 INJECTION INTRAVENOUS at 08:49

## 2022-05-27 RX ADMIN — ENOXAPARIN SODIUM 40 MG: 100 INJECTION SUBCUTANEOUS at 08:49

## 2022-05-27 RX ADMIN — PIPERACILLIN AND TAZOBACTAM 3375 MG: 3; .375 INJECTION, POWDER, FOR SOLUTION INTRAVENOUS at 02:17

## 2022-05-27 RX ADMIN — CARBIDOPA AND LEVODOPA 1 TABLET: 25; 100 TABLET ORAL at 08:49

## 2022-05-27 RX ADMIN — FERROUS SULFATE TAB EC 325 MG (65 MG FE EQUIVALENT) 325 MG: 325 (65 FE) TABLET DELAYED RESPONSE at 08:49

## 2022-05-27 NOTE — CARE COORDINATION
Transitional Planning    Spoke with [facility]. Updated her that pt is tolerating tube feeds and mentation has not changed. She states pt can d/c today requesting time before lunch    Spoke with [facility], they can accommodate 1030am    Updated [facility] on transport time. She is agreeable    1020 AVS faxed to [address] with family, they are agreeable to transfer    # for Report [phone]       Discharge Report    [address] [place] [address]  Clinical Case Management Department  Written by:  Denver Laine, RN    Patient Name: Gracy Brothers  Attending Provider: Gwen Grover MD  Admit Date: 5/15/2022  3:39 PM  MRN: [id number]  Account: [de-identified]                     : 1945  Discharge Date: 22      Disposition: SNF    Denver Laine, RN

## 2022-05-27 NOTE — PROGRESS NOTES
Comprehensive Nutrition Assessment    Type and Reason for Visit:  Reassess    Nutrition Recommendations/Plan:   1. Continue TF, Standard without Fiber at 50 mL/hr to provide 1440 kcals, 67 g PRO +  306 kcals from D5  2. Suggest monitoring weights and adjusting TF rate as needed, pt admission wt and current wt are significantly different (51.5 kg and 62.1 kg)  3. Will monitor TF tolerance, labs, plan of care     Malnutrition Assessment:  Malnutrition Status: Moderate malnutrition (to severe) (05/16/22 1323)    Context:  Chronic Illness     Findings of the 6 clinical characteristics of malnutrition:  Energy Intake:  Unable to assess  Weight Loss:  Unable to assess     Body Fat Loss:  Mild body fat loss (to moderate) Buccal region,Triceps   Muscle Mass Loss:  Severe muscle mass loss Clavicles (pectoralis & deltoids)  Fluid Accumulation:  No significant fluid accumulation     Strength:  Not Performed    Nutrition Assessment:    RN reports pt tolerating TF, Standard without Fiber at 30 mL/hr. TF goal of 50 mL/hr with D5, 0.9 NS running at 75 mL/hr (306 kcal). RN reports pt is being discharged today. Suggest monitoring weights and adjusting TF rate as needed, as pt admission wt and current wt are significantly different (51.5 kg and 62.1 kg). Current TF goal of 50 mL/hr based off admission wt (51.5 kg) without D5 running. Nutrition Related Findings:    labs/meds reviewed. LBM 5/21. BUE edema noted.  Wound Type: None       Current Nutrition Intake & Therapies:    Average Meal Intake: NPO  Average Supplements Intake: None Ordered  Current Tube Feeding (TF) Orders:  · Feeding Route: Nasogastric  · Formula: Standard without Fiber  · Schedule: Continuous  · Feeding Regimen: 50 mL/hr  · Additives/Modulars: None  · Water Flushes: 30 mL every 4 hrs  · Current TF & Flush Orders Provides: 50 mL/hr = 1440 kcals, 67 g PRO, 1164 mL FW + 180 mL from water flushes  · Goal TF & Flush Orders Provides:        Anthropometric Measures:  Height: 5' 9.02\" (175.3 cm)  Ideal Body Weight (IBW): 160 lbs (73 kg)    Admission Body Weight: 113 lb 9.6 oz (51.5 kg)  Current Body Weight: 137 lb (62.1 kg),   IBW.  Weight Source: Bed Scale  Current BMI (kg/m2): 20.2        Weight Adjustment For: No Adjustment                 BMI Categories: Underweight (BMI less than 22) age over 72    Estimated Daily Nutrient Needs:  Energy Requirements Based On: Kcal/kg  Weight Used for Energy Requirements:  (140 lb 3.2 oz (63.6 kg) 5/23)  Energy (kcal/day): 1800 kcals/day (30 kcal/kg); (1500 kcals/day based off admit weight 51 kg)  Weight Used for Protein Requirements: Current  Protein (g/day): 90 g/day; (75 g/day based off admit weight 51 kg)  Method Used for Fluid Requirements: ml/Kg (25-28)  Fluid (ml/day): 1105-2630 mL/day or per MD    Nutrition Diagnosis:   · Inadequate oral intake related to swallowing difficulty,impaired respiratory function as evidenced by NPO or clear liquid status due to medical condition,nutrition support - enteral nutrition      Nutrition Interventions:   Food and/or Nutrient Delivery: Continue NPO  Nutrition Education/Counseling: No recommendation at this time  Coordination of Nutrition Care: Continue to monitor while inpatient       Goals:  Previous Goal Met: Progressing toward Goal(s)  Goals: Meet at least 75% of estimated needs,prior to discharge       Nutrition Monitoring and Evaluation:   Behavioral-Environmental Outcomes: None Identified  Food/Nutrient Intake Outcomes: Enteral Nutrition Intake/Tolerance  Physical Signs/Symptoms Outcomes: Nutrition Focused Physical Findings,Biochemical Data,Weight,Fluid Status or Edema    Discharge Planning:    Enteral Nutrition     Cain Frank, [address]  Contact: [phone]

## 2022-05-27 NOTE — PROGRESS NOTES
YULIYA HU, [facility] Assessment complete. Syncope and collapse [R55]  Weakness [R53.1] . Vitals:    05/27/22 0801   BP: (!) 149/85   Pulse: 71   Resp: 19   Temp: 98.1 °F (36.7 °C)   SpO2: 98%   . Patients home meds are   Prior to Admission medications    Medication Sig Start Date End Date Taking? Authorizing Provider   amLODIPine (NORVASC) 10 MG tablet 1 tablet by Per NG tube route daily 5/27/22 6/26/22 Yes John Galicia MD   carbidopa-levodopa (SINEMET)  MG per tablet 1 tablet by Per NG tube route 3 times daily 5/26/22 7/25/22 Yes John Galicia MD   sennosides-docusate sodium (SENOKOT-S) 8.6-50 MG tablet 2 tablets by Per NG tube route daily for 15 days 5/26/22 6/10/22 Yes John Galicia MD   Nutritional Supplements (BOOST 100 CALORIE SMART) LIQD 1 Bottle by Nasogastric route in the morning and at bedtime 5/26/22 8/24/22 Yes John Galicia MD   ferrous sulfate (FE TABS) 325 (65 Fe) MG EC tablet Take 1 tablet by mouth daily (with breakfast) 5/26/22 8/24/22 Yes John Galicia MD   amoxicillin-clavulanate (AUGMENTIN) 875-125 MG per tablet 1 tablet by Per NG tube route 2 times daily for 5 days 5/26/22 5/31/22 Yes John Galicia MD   Ergocalciferol (VITAMIN D) 93375 units CAPS Take 50,000 Units by mouth once a week for 7 doses 5/25/22 7/7/22 Yes Kristyn Mendes MD   .  Recent Surgical History: None = 0     Assessment     Peak Flow (asthma only)    Predicted: na  Personal Best: na  PEF na  % Predicted na  Peak Flow : not applicable = 0    RVW5/PNL    FEV1 Predicted na      FEV1 na    FEV1 % Predicted na  FVC na  IS volume na  IBW na    RR 19  Breath Sounds: rhonchi      · Bronchodilator assessment at level  1 pt.  refusing tx.'s  · Hyperinflation assessment at level   · Secretion Management assessment at level  1  ·   · []    Bronchodilator Assessment  BRONCHODILATOR ASSESSMENT SCORE  Score 0 1 2 3 4 5   Breath Sounds   []  Patient Baseline [x]  No Wheeze good aeration []  Faint, scattered wheezing, good aeration []  Expiratory Wheezing and or moderately diminished []  Insp/Exp wheeze and/or very diminished []  Insp/Exp and/ or marked distress   Respiratory Rate   []  Patient Baseline [x]  Less than 20 [x]  Less than 20 []  20-25 []  Greater than 25 []  Greater than 25   Peak flow % of Pred or PB [x]  NA   []  Greater than 90%  []  81-90% []  71-80% []  Less than or equal to 70%  or unable to perform []  Unable due to Respiratory Distress   Dyspnea re []  Patient Baseline [x]  No SOB [x]  No SOB []  SOB on exertion []  SOB min activity []  At rest/acute   e FEV% Predicted       [x]  NA []  Above 69%  []  Unable []  Above 60-69%  []  Unable []  Above 50-59%  []  Unable []  Above 35-49%  []  Unable []  Less than 35%  []  Unable                 []  Hyperinflation Assessment  Score 1 2 3   CXR and Breath Sounds   []  Clear []  No atelectasis  Basilar aeration []  Atelectasis or absent basilar breath sounds   Incentive Spirometry Volume  (Per IBW)   []  Greater than or equal to 15ml/Kg []  less than 15ml/Kg []  less than 15ml/Kg   Surgery within last 2 weeks []  None or general   []  Abdominal or thoracic surgery  []  Abdominal or thoracic   Chronic Pulmonary Historyre []  No []  Yes []  Yes     []  Secretion Management Assessment  Score 1 2 3   Bilateral Breath Sounds   []  Occasional Rhonchi [x]  Scattered Rhonchi []  Course Rhonchi and/or poor aeration   Sputum    [x]  Small amount of thin secretions []  Moderate amount of viscous secretions []  Copius, Viscious Yellow/ Secretions   CXR as reported by physician [x]  clear  []  Unavailable []  Infiltrates and/or consolidation  []  Unavailable []  Mucus Plugging and or lobar consolidation  []  Unavailable   Cough []  Strong, productive cough []  Weak productive cough []  No cough or weak non-productive cough   YULIYA HU RCP  8:31 AM                            FEMALE                                  MALE                            FEV1 Predicted Normal Values FEV1 Predicted Normal Values          Age                                     Height in Feet and Inches       Age                                     Height in Feet and Inches       4' 11\" 5' 1\" 5' 3\" 5' 5\" 5' 7\" 5' 9\" 5' 11\" 6' 1\"  4' 11\" 5' 1\" 5' 3\" 5' 5\" 5' 7\" 5' 9\" 5' 11\" 6' 1\"   42 - 45 2.49 2.66 2.84 3.03 3.22 3.42 3.62 3.83 42 - 45 2.82 3.03 3.26 3.49 3.72 3.96 4.22 4.47   46 - 49 2.40 2.57 2.76 2.94 3.14 3.33 3.54 3.75 46 - 49 2.70 2.92 3.14 3.37 3.61 3.85 4.10 4.36   50 - 53 2.31 2.48 2.66 2.85 3.04 3.24 3.45 3.66 50 - 53 2.58 2.80 3.02 3.25 3.49 3.73 3.98 4.24   54 - 57 2.21 2.38 2.57 2.75 2.95 3.14 3.35 3.56 54 - 57 2.46 2.67 2.89 3.12 3.36 3.60 3.85 4.11   58 - 61 2.10 2.28 2.46 2.65 2.84 3.04 3.24 3.45 58 - 61 2.32 2.54 2.76 2.99 3.23 3.47 3.72 3.98   62 - 65 1.99 2.17 2.35 2.54 2.73 2.93 3.13 3.34 62 - 65 2.19 2.40 2.62 2.85 3.09 3.33 3.58 3.84   66 - 69 1.88 2.05 2.23 2.42 2.61 2.81 3.02 3.23 66 - 69 2.04 2.26 2.48 2.71 2.95 3.19 3.44 3.70   70+ 1.82 1.99 2.17 2.36 2.55 2.75 2.95 3.16 70+ 1.97 2.19 2.41 2.64 2.87 3.12 3.37 3.62             Predicted Peak Expiratory Flow Rate                                       Height (in)  Female       Height (in) Male           Age 79 88 64 68 40 58 24 79 Age            20 241 017 966 551 365 432 682 210  49 22 16 26 26 53 45 71 76   25 337 352 366 381 396 411 426 441 25 447 476 505 533 [phone] 677   30 329 344 359 374 389 404 419 434 30 437 466 494 523 552 580 609 638 667   35 322 337 351 366 381 396 411 426 35 426 455 484 [phone] 657   40 314 329 344 359 374 389 404 419 40 416 445 473 502 531 559 588 617 647   45 307 322 336 351 366 381 396 411 45 405 434 463 491 520 549 577 606 636   50 299 314 329 344 359 374 389 404 50 395 424 452 481 510 538 567 596 625   55 292 307 321 336 351 366 381 396 55 384 413 442 470 499 528 556 585 615   60 284 299 314 329 344 359 374 389 60 374 403 431 460 489 517 546 575 605   65 277 292 306 321 336 351 366 381 65 363 392 421 449 478 507 535 564 594   70 269 284 299 314 329 344 359 374 70 353 382 410 439 468 496 525 554 583   75 261 274 289 305 319 334 348 364 75 344 372 400 429 458 487 515 544 573   80 253 266 282 296 312 327 342 356 80 335 362 390 419 448 [phone] 562

## 2022-05-27 NOTE — PROGRESS NOTES
[facility]  Internal Medicine Teaching Residency Program  Inpatient Daily Progress Note  ______________________________________________________________________________    Patient: Kate Estimable  YOB: 1945   HMX:[id number]    Acct: [de-identified]     Room: [facility]0776-63  Admit date: 5/15/2022  Today's date: 05/27/22  Number of days in the hospital: 12    SUBJECTIVE   Admitting Diagnosis: Parkinson's disease ([facility] [address]  CC: syncopal episode, weakness  Pt examined at bedside. Chart & results reviewed. No acute events overnight. Patient is afebrile and hemodynamically stable. Vital signs are stable. Patient is weak, with B/L upper extremity resting tremors. Has difficulty clearing his secretions due to oropharyngeal muscle weakness, NG tube placed. Patient is able to tolerate tube feed well. AAO x3, afebrile, on NC 1.5 L. BP labile. Palliative care consulted for plan of care. Brother is the only legal spokesperson. Patient is full code. ROS:  Constitutional:  negative for chills, fevers, sweats  Respiratory: positive for cough, dyspnea on exertion,  hemoptysis, shortness of breath, wheezing  Cardiovascular:  negative for chest pain, chest pressure/discomfort, lower extremity edema, palpitations  Gastrointestinal:  negative for abdominal pain, constipation, diarrhea, nausea, vomiting  Neurological:  negative for dizziness, headache  BRIEF HISTORY   The patient is a pleasant 76 y. o. male with past medical history signifiacnt for parkinsonism disease, hypertension and hyperlipidemia presents through EMS with chief complaint of syncope that occurred just prior to arrival.  History was mostly provided by the caretaker the ER resident.      Patient is admitted for syncopal episode work-up.  Likely multifactorial due to dysautonomia, orthostatic hypotension, neurocardiogenic, neurogenic due to cervical spinal stenosis and canal narrowing.  Cardiology consulted No cyanosis or clubbing  Neurological/Psychiatric: The patient's general behavior, level of consciousness, thought content and emotional status is normal.        Medications:  Scheduled Medications:    amLODIPine  10 mg Per NG tube Daily    carbidopa-levodopa  1 tablet Per NG tube TID    [START ON 6/1/2022] vitamin D  50,000 Units Per NG tube Weekly    ipratropium-albuterol  1 ampule Inhalation Q4H WA    piperacillin-tazobactam  3,375 mg IntraVENous Q8H    ferrous sulfate  325 mg Oral Daily with breakfast    enoxaparin  40 mg SubCUTAneous Daily    sodium chloride flush  5-40 mL IntraVENous 2 times per day     Continuous Infusions:    dextrose 5 % and 0.9 % NaCl 75 mL/hr at 05/26/22 2236    dextrose 100 mL/hr (05/19/22 0340)    sodium chloride       PRN MedicationshydrALAZINE, 10 mg, Q6H PRN  acetaminophen, 650 mg, Q6H PRN   Or  acetaminophen, 650 mg, Q6H PRN  albuterol, 2.5 mg, As Directed RT PRN  labetalol, 10 mg, Q4H PRN  sennosides-docusate sodium, 2 tablet, Daily PRN  sodium chloride, 30 mL, PRN  glucose, 4 tablet, PRN  dextrose bolus, 125 mL, PRN   Or  dextrose bolus, 250 mL, PRN  glucagon (rDNA), 1 mg, PRN  dextrose, 100 mL/hr, PRN  sodium chloride flush, 10 mL, PRN  sodium chloride flush, 5-40 mL, PRN  sodium chloride, , PRN  ondansetron, 4 mg, Q8H PRN   Or  ondansetron, 4 mg, Q6H PRN  polyethylene glycol, 17 g, Daily PRN        Diagnostic Labs:  CBC:   Recent Labs     05/25/22 0559 05/26/22  0453 05/27/22  0250   WBC 12.1* 8.0 6.6   RBC 3.27* 2.78* 2.80*   HGB 9.9* 8.5* 8.5*   HCT 29.4* 25.9* 25.9*   MCV 89.9 93.2 92.5   RDW 14.7* 15.1* 15.0*    150 155     BMP:   Recent Labs     05/25/22  0559 05/26/22  0453 05/27/22  0250    138 139   K 3.4* 3.6* 3.9   * 108* 109*   CO2 21 23 23   BUN 8 8 10   CREATININE 0.72 0.72 0.84     BNP: No results for input(s): BNP in the last 72 hours. PT/INR: No results for input(s): PROTIME, INR in the last 72 hours.   APTT: No results for input(s): APTT in the last 72 hours. CARDIAC ENZYMES: No results for input(s): CKMB, CKMBINDEX, TROPONINI in the last 72 hours. Invalid input(s): CKTOTAL;3  FASTING LIPID PANEL:No results found for: CHOL, HDL, TRIG  LIVER PROFILE: No results for input(s): AST, ALT, ALB, BILIDIR, BILITOT, ALKPHOS in the last 72 hours. MICROBIOLOGY:   Lab Results   Component Value Date/Time    CULTURE NO GROWTH 5 DAYS 05/17/2022 09:05 AM       Imaging:    XR SHOULDER RIGHT (MIN 2 VIEWS)    Result Date: 5/19/2022  Moderate glenohumeral and moderate to severe acromioclavicular joint degenerative change without fracture. High riding humeral head indicative of chronic high-grade rotator cuff pathology. XR CHEST PORTABLE    Result Date: 5/24/2022  Stable bibasilar parenchymal opacities and bilateral pleural effusions. XR CHEST PORTABLE    Result Date: 5/21/2022  1. Stable mild bibasilar airspace disease and small bilateral pleural effusions. Overall, stable chest x-ray from 05/20/2022, 1333 hours. Follow-up is recommended to document resolution. 2. Stable elevation of the right hemidiaphragm. 3. Stable cardiomegaly. XR CHEST PORTABLE    Result Date: 5/20/2022  Elevation of the right hemidiaphragm with right basilar airspace opacities. These opacities could represent atelectasis or in the appropriate clinical setting pneumonia. FL MODIFIED BARIUM SWALLOW W VIDEO    Result Date: 5/24/2022  1. Penetration followed by aspiration with the nectar thick substance. 2. No pureed/pudding thick substance was swallowed into it was mixed with the nectar thick substance. 3. Remainder of the substances not attempted. Please see separate speech pathology report for full discussion of findings and recommendations.  RECOMMENDATIONS: Unavailable       ASSESSMENT & PLAN     Principal Problem:    Parkinson's disease ([address]  Active Problems:    Weakness    COVID-19    Cervical myelopathy (HCC)    Hypertension    Iron deficiency anemia

## 2022-05-27 NOTE — PROGRESS NOTES
Speech Language Pathology  Speech Language Pathology  [facility]    Dysphagia Treatment Note    Date: 5/27/2022  Patients Name: Salina Sandy  MRN: [id number]  Diagnosis:   Patient Active Problem List   Diagnosis Code    Weakness R53.1    Parkinson's disease ([facility] [address] G20    COVID-19 U07.1    Cervical myelopathy ([facility] [address] G95.9    Hypertension I10    Iron deficiency anemia D50.9    Moderate protein-calorie malnutrition ([facility] [address] E44.0    Community acquired bilateral lower lobe pneumonia J18.9    Encounter for palliative care Z51.5       Pain: pt denies    Dysphagia Treatment  Treatment time: 7633-3401    Subjective: [x] Alert [x] Cooperative     [] Confused     [] Agitated    [] Lethargic    Objective/Assessment:    Pt. Seen for O/M treatment program.  Pt. Completed O/M exercises X 8-10 X 1 sets with mod cues. Pt. Completed josh maneuver X 2 reps with max cues. Education provided and exercises left at bedside. Plan:  [x] Continue ST services    [] Discharge from ST:        Discharge recommendations: []  Further therapy recommended at discharge. The patient should be able to tolerate at least 3 hours of therapy per day over 5 days or 15 hours over 7 days. [x] Further therapy recommended at discharge. [] No therapy recommended at discharge.          Treatment completed by: Eloy Wong, SLP, M.S. Nazario Jean Baptiste

## 2022-05-28 NOTE — DISCHARGE SUMMARY
[address]     Department of Internal Medicine - Staff Internal Medicine Teaching Service    INPATIENT DISCHARGE SUMMARY      Patient Identification:  Leodan Franz is a 77 y.o. male. :  1945  MRN: [id number]     Acct: [de-identified]   PCP: No primary care provider on file. Admit Date:  5/15/2022  Discharge date and time: 2022 10:52 AM   Attending Provider: No att. providers found                                     [address] Problem Lists:  Principal Problem:    Parkinson's disease ([address]  Active Problems:    Weakness    COVID-19    Cervical myelopathy (HCC)    Hypertension    Iron deficiency anemia    Moderate protein-calorie malnutrition ([address]    Community acquired bilateral lower lobe pneumonia    Encounter for palliative care  Resolved Problems:    Syncope and collapse    Hypotension    KALYAN (acute kidney injury) ([address]    Orthostatic hypertension    Thrombocytopenia (HCC)    CRP elevated      HOSPITAL STAY     Brief Inpatient course:   Leodan Franz is a 77 y.o. male who was admitted for the management of Parkinson's disease ([facility] [address] presented to the emergency department with The patient is a pleasant 76 y. o. male with chief complaint of syncope that occurred just prior to arrival.  History was mostly provided by the caretaker the ER resident.     PMH parkinsonism disease, hypertension and hyperlipidemia.     Patient is admitted for syncopal episode work-up.  Likely multifactorial due to dysautonomia, orthostatic hypotension, neurocardiogenic, neurogenic due to cervical spinal stenosis and canal narrowing.  Cardiology consulted for ruling out cardiogenic causes.    Dehydration secondary to decreased oral intake causing KALYAN, low blood pressure, orthostatic hypotension. COVID-positive.    Echo LVEF > 55%  Holter monitoring on discharge per cardio  F/u with NSG in 4 weeks. Keep NGT for nutrition for 1 week, repeat swallow study in 1 week. Palliative care consult. Re-start Sinemet  1 tab TID per NG tube. Augmentin 2 times daily for pneumonia.  significant therapeutic interventions done at the hospital:   1. Syncope and collapse.  Multifactorial.  Likely 2/2 orthostatic hypotension 2/2 dysautonomia due to Parkinson's disease/dehydration vs cervical myelopathy vs cardiogenic vs COVID infection.  MRI cervical spine shows cervical canal narrowing with myelopathy. Echo LVEF >55 %. Holter monitoring as OP. Follow up with cardio as OP. 2. Concern for aspiration due weak laryngeal muscle due parkinson's disease vs acute neurologic event. continue on Augmentin for 5 days. Continue meds through NGT for a week. Might need PEG in future. Palliative care follow-up for goals of care discussion. 3. Cervical myelopathy: Symptomatic. Neurosurgery evaluated. OP surgery follow up. 4. Parkinson's disease.  Continue on Sinemet 1 tablet 3 times daily. 5. COVID-19 infection, on 1.5 L via NC. concern for superimposed bacterial/ aspiration pneumonia. Continue on Augmentin. S/p  Remdesivir for 5 days.   6. KALYAN.  Resolved   7. HTN: uncontrolled. Continue on amlodipine 10 mg daily. 8. Orthostatic hypotension: Resolved.   9. Chronic alcoholism.  Multivitamin and folate supplementation. 10. Hypocalcemia. Replace calcium follow-up with PCP.   Procedures/ Significant Interventions:    none    Consults:     Consults:     Final Specialist Recommendations/Findings:   IP CONSULT TO NEUROLOGY  IP CONSULT TO CARDIOLOGY  IP CONSULT TO NEUROSURGERY  IP CONSULT TO INTERNAL MEDICINE  IP CONSULT TO CASE MANAGEMENT  IP CONSULT TO DIETITIAN  IP CONSULT TO INFECTIOUS DISEASES  PHARMACY TO DOSE MEDICATION  IP CONSULT TO NEUROLOGY  PALLIATIVE CARE EVAL  IP CONSULT TO PALLIATIVE CARE  IP CONSULT TO DIETITIAN      Any Hospital Acquired Infections: none    Discharge Functional Status:  stable    DISCHARGE PLAN     Disposition: SNF    Patient Instructions:   Discharge Medication List as of 5/27/2022 10:15 AM      START taking these medications    Details   amoxicillin-clavulanate (AUGMENTIN) 875-125 MG per tablet 1 tablet by Per NG tube route 2 times daily for 5 days, Disp-10 tablet, R-0DC to SNF      Ergocalciferol (VITAMIN D) 23399 units CAPS Take 50,000 Units by mouth once a week for 7 dosesDC to SNF         CONTINUE these medications which have CHANGED    Details   amLODIPine (NORVASC) 10 MG tablet 1 tablet by Per NG tube route daily, Disp-30 tablet, R-3DC to SNF      carbidopa-levodopa (SINEMET)  MG per tablet 1 tablet by Per NG tube route 3 times daily, Disp-90 tablet, R-1DC to SNF      sennosides-docusate sodium (SENOKOT-S) 8.6-50 MG tablet 2 tablets by Per NG tube route daily for 15 days, Disp-30 tablet, R-0DC to SNF      ferrous sulfate (FE TABS) 325 (65 Fe) MG EC tablet Take 1 tablet by mouth daily (with breakfast), Disp-90 tablet, R-0Normal         CONTINUE these medications which have NOT CHANGED    Details   Nutritional Supplements (BOOST 100 CALORIE SMART) LIQD 1 Bottle by Nasogastric route in the morning and at bedtime, Disp-5 each, R-3DC to SNF             Activity: activity as tolerated    Diet: regular diet    Follow-up:    [facility]   [address]  [phone]  In 6 weeks  Hospital follow-up    Deyanira Giron, [address] [facility] [address] [facility]  [phone]    In 4 weeks  Neurosurgery please follow up in 4 weeks to discuss surgery     No Fountain MD  [address] [facility] [address]    Schedule an appointment as soon as possible for a visit in 1 week  post hospital, PCP establish    [facility] [address] [facility]  [phone]          Patient Instructions:   Continue PT,OT  COVID pneumonia, treated with Remdesivir. Monitor breathing. Orthostatic hypotension, syncope- treated with fluids.  Make sure hydrated, use compression stockings, get up slowly with support, use mobility device for support, fall precautions. Wear holter monitor for 48 hrs, f/u with Cardiology in 2 weeks  F/u with Dr. Gail Caal in 1 week    Follow-up with Neurosurgery for cervvical myelopathy for possible decompression surgery  [address]/[facility] (Medical Office [facility])  [address]  Call [phone] for an appointment  Follow up labs: none   Follow up imaging: Modified barium swallow study 6/3    Note that over 30 minutes was spent in preparing discharge papers, discussing discharge with patient, medication review, etc.      Kris Gerardo MD,   Internal Medicine Resident, PGY-1  [facility];  [place], [place]  5/27/2022, 10:43 PM

## 2022-05-29 ENCOUNTER — APPOINTMENT (OUTPATIENT)
Dept: GENERAL RADIOLOGY | Age: 77
DRG: 391 | End: 2022-05-29
Payer: OTHER GOVERNMENT

## 2022-05-29 ENCOUNTER — HOSPITAL ENCOUNTER (INPATIENT)
Age: 77
LOS: 4 days | Discharge: NURSING, REHAB OR LONG-TERM CARE | DRG: 391 | End: 2022-06-02
Attending: EMERGENCY MEDICINE | Admitting: INTERNAL MEDICINE
Payer: OTHER GOVERNMENT

## 2022-05-29 DIAGNOSIS — Z46.59 ENCOUNTER FOR NASOGASTRIC TUBE PLACEMENT: Primary | ICD-10-CM

## 2022-05-29 DIAGNOSIS — J69.0 ASPIRATION PNEUMONIA OF BOTH LUNGS, UNSPECIFIED ASPIRATION PNEUMONIA TYPE, UNSPECIFIED PART OF LUNG (HCC): ICD-10-CM

## 2022-05-29 PROBLEM — E86.0 DEHYDRATION: Status: ACTIVE | Noted: 2022-05-29

## 2022-05-29 LAB
ABSOLUTE EOS #: 0.17 K/UL (ref 0–0.44)
ABSOLUTE IMMATURE GRANULOCYTE: 0.06 K/UL (ref 0–0.3)
ABSOLUTE LYMPH #: 1.1 K/UL (ref 1.1–3.7)
ABSOLUTE MONO #: 0.54 K/UL (ref 0.1–1.2)
ANION GAP SERPL CALCULATED.3IONS-SCNC: 9 MMOL/L (ref 9–17)
BASOPHILS # BLD: 1 % (ref 0–2)
BASOPHILS ABSOLUTE: 0.04 K/UL (ref 0–0.2)
BUN BLDV-MCNC: 14 MG/DL (ref 8–23)
CALCIUM SERPL-MCNC: 7.8 MG/DL (ref 8.6–10.4)
CHLORIDE BLD-SCNC: 104 MMOL/L (ref 98–107)
CO2: 25 MMOL/L (ref 20–31)
CREAT SERPL-MCNC: 0.78 MG/DL (ref 0.7–1.2)
EOSINOPHILS RELATIVE PERCENT: 3 % (ref 1–4)
GFR AFRICAN AMERICAN: >60 ML/MIN
GFR NON-AFRICAN AMERICAN: >60 ML/MIN
GFR SERPL CREATININE-BSD FRML MDRD: ABNORMAL ML/MIN/{1.73_M2}
GLUCOSE BLD-MCNC: 73 MG/DL (ref 70–99)
HCT VFR BLD CALC: 27.5 % (ref 40.7–50.3)
HEMOGLOBIN: 8.9 G/DL (ref 13–17)
IMMATURE GRANULOCYTES: 1 %
LYMPHOCYTES # BLD: 16 % (ref 24–43)
MAGNESIUM: 1.9 MG/DL (ref 1.6–2.6)
MCH RBC QN AUTO: 30.3 PG (ref 25.2–33.5)
MCHC RBC AUTO-ENTMCNC: 32.4 G/DL (ref 28.4–34.8)
MCV RBC AUTO: 93.5 FL (ref 82.6–102.9)
MONOCYTES # BLD: 8 % (ref 3–12)
NRBC AUTOMATED: 0 PER 100 WBC
PDW BLD-RTO: 14.7 % (ref 11.8–14.4)
PLATELET # BLD: 132 K/UL (ref 138–453)
PMV BLD AUTO: 10.7 FL (ref 8.1–13.5)
POTASSIUM SERPL-SCNC: 3.9 MMOL/L (ref 3.7–5.3)
RBC # BLD: 2.94 M/UL (ref 4.21–5.77)
RBC # BLD: ABNORMAL 10*6/UL
SARS-COV-2, RAPID: DETECTED
SEG NEUTROPHILS: 71 % (ref 36–65)
SEGMENTED NEUTROPHILS ABSOLUTE COUNT: 4.79 K/UL (ref 1.5–8.1)
SODIUM BLD-SCNC: 138 MMOL/L (ref 135–144)
SPECIMEN DESCRIPTION: ABNORMAL
WBC # BLD: 6.7 K/UL (ref 3.5–11.3)

## 2022-05-29 PROCEDURE — 6370000000 HC RX 637 (ALT 250 FOR IP): Performed by: HEALTH CARE PROVIDER

## 2022-05-29 PROCEDURE — 87635 SARS-COV-2 COVID-19 AMP PRB: CPT

## 2022-05-29 PROCEDURE — 71045 X-RAY EXAM CHEST 1 VIEW: CPT

## 2022-05-29 PROCEDURE — 85025 COMPLETE CBC W/AUTO DIFF WBC: CPT

## 2022-05-29 PROCEDURE — 74018 RADEX ABDOMEN 1 VIEW: CPT

## 2022-05-29 PROCEDURE — 99285 EMERGENCY DEPT VISIT HI MDM: CPT

## 2022-05-29 PROCEDURE — 80048 BASIC METABOLIC PNL TOTAL CA: CPT

## 2022-05-29 PROCEDURE — 2580000003 HC RX 258: Performed by: HEALTH CARE PROVIDER

## 2022-05-29 PROCEDURE — 1200000000 HC SEMI PRIVATE

## 2022-05-29 PROCEDURE — 83735 ASSAY OF MAGNESIUM: CPT

## 2022-05-29 RX ORDER — AMLODIPINE BESYLATE 10 MG/1
10 TABLET ORAL DAILY
Status: DISCONTINUED | OUTPATIENT
Start: 2022-05-30 | End: 2022-06-02 | Stop reason: HOSPADM

## 2022-05-29 RX ORDER — LIDOCAINE HYDROCHLORIDE 20 MG/ML
JELLY TOPICAL ONCE
Status: COMPLETED | OUTPATIENT
Start: 2022-05-29 | End: 2022-05-29

## 2022-05-29 RX ORDER — ONDANSETRON 4 MG/1
4 TABLET, ORALLY DISINTEGRATING ORAL EVERY 8 HOURS PRN
Status: DISCONTINUED | OUTPATIENT
Start: 2022-05-29 | End: 2022-06-02 | Stop reason: HOSPADM

## 2022-05-29 RX ORDER — SODIUM CHLORIDE 9 MG/ML
INJECTION, SOLUTION INTRAVENOUS CONTINUOUS
Status: DISCONTINUED | OUTPATIENT
Start: 2022-05-29 | End: 2022-05-30

## 2022-05-29 RX ORDER — ENOXAPARIN SODIUM 100 MG/ML
40 INJECTION SUBCUTANEOUS DAILY
Status: DISCONTINUED | OUTPATIENT
Start: 2022-05-30 | End: 2022-06-02 | Stop reason: HOSPADM

## 2022-05-29 RX ORDER — ONDANSETRON 2 MG/ML
4 INJECTION INTRAMUSCULAR; INTRAVENOUS EVERY 6 HOURS PRN
Status: DISCONTINUED | OUTPATIENT
Start: 2022-05-29 | End: 2022-06-02 | Stop reason: HOSPADM

## 2022-05-29 RX ORDER — 0.9 % SODIUM CHLORIDE 0.9 %
1000 INTRAVENOUS SOLUTION INTRAVENOUS ONCE
Status: DISCONTINUED | OUTPATIENT
Start: 2022-05-29 | End: 2022-05-29

## 2022-05-29 RX ORDER — SODIUM CHLORIDE 0.9 % (FLUSH) 0.9 %
5-40 SYRINGE (ML) INJECTION EVERY 12 HOURS SCHEDULED
Status: DISCONTINUED | OUTPATIENT
Start: 2022-05-29 | End: 2022-06-02 | Stop reason: HOSPADM

## 2022-05-29 RX ORDER — SODIUM CHLORIDE 9 MG/ML
INJECTION, SOLUTION INTRAVENOUS PRN
Status: DISCONTINUED | OUTPATIENT
Start: 2022-05-29 | End: 2022-06-02 | Stop reason: HOSPADM

## 2022-05-29 RX ORDER — SODIUM CHLORIDE 0.9 % (FLUSH) 0.9 %
5-40 SYRINGE (ML) INJECTION PRN
Status: DISCONTINUED | OUTPATIENT
Start: 2022-05-29 | End: 2022-06-02 | Stop reason: HOSPADM

## 2022-05-29 RX ADMIN — SODIUM CHLORIDE: 9 INJECTION, SOLUTION INTRAVENOUS at 19:36

## 2022-05-29 RX ADMIN — LIDOCAINE HYDROCHLORIDE: 20 JELLY TOPICAL at 14:22

## 2022-05-29 ASSESSMENT — ENCOUNTER SYMPTOMS
SHORTNESS OF BREATH: 0
COUGH: 1
ABDOMINAL PAIN: 0
VOMITING: 0
CHOKING: 1
SORE THROAT: 0
NAUSEA: 0
DIARRHEA: 0

## 2022-05-29 ASSESSMENT — PAIN SCALES - GENERAL: PAINLEVEL_OUTOF10: 0

## 2022-05-29 NOTE — ED PROVIDER NOTES
[facility]  Emergency Department  Faculty Attestation     I performed a history and physical examination of the patient and discussed management with the resident. I reviewed the residents note and agree with the documented findings and plan of care. Any areas of disagreement are noted on the chart. I was personally present for the key portions of any procedures. I have documented in the chart those procedures where I was not present during the key portions. I have reviewed the emergency nurses triage note. I agree with the chief complaint, past medical history, past surgical history, allergies, medications, social and family history as documented unless otherwise noted below. For Physician Assistant/ Nurse Practitioner cases/documentation I have personally evaluated this patient and have completed at least one if not all key elements of the E/M (history, physical exam, and MDM). Additional findings are as noted. Primary Care Physician:  No primary care provider on file. Screenings:  [unfilled]    CHIEF COMPLAINT       Chief Complaint   Patient presents with    Feeding Tube Problem     [facility]   Temp: 98.4 °F (36.9 °C),  Heart Rate: 77, Resp: 15, BP: (!) 146/105    LABS:  Labs Reviewed - No data to display    Radiology  No orders to display       CRITICAL CARE: There was  a high probability of clinically significant/life threatening deterioration in this patient's condition which required my urgent intervention. Total critical care time was none minutes. This excludes any time for separately reportable procedures. EKG:      Attending Physician Additional  Notes    Patient accidentally pulled out his NG tube which was being used for feedings. No other complaints verbalized however patient is not a good historian.   When asked whether we can replace the tube he repeats what I say.  He has a history of Parkinson's. On exam he is slightly hypertensive but afebrile no respiratory distress. There is mild tympany in the epigastrium but no rebound guarding or distention. Skin is warm and dry with normal capillary refill. Will review chart to identify power of  to discuss tube replacement, consider interventional radiology and possibly bridle if NG tube is to be reinserted. Bridle inserted easily. NG tube is unable to be passed beyond the GE junction on multiple attempts. Imaging at 60 cm shows it still coiling in the esophagus. Patient unable to swallow to facilitate passing. Will start IV for hydration, send chemistries, admission for either G-tube or NG tube placement. Brendon Holland.  Izabela Deras MD, [facility]  Attending Emergency  Physician               Renzo Goins MD  05/29/22 3597       Renzo Goins MD  05/29/22 1899

## 2022-05-29 NOTE — ED NOTES
Patient to ED from [facility]. Per EMS the patient removed his NG tube accidentally. Patient receives his medication and nutrition through NG. Patient reports that he has parkinson's disease. Patient placed on continuous monitor. Patient's legs elevated, weight removed from heels. Dr Aisha Wells at bedside.        Eron Calderon RN  05/29/22 5423

## 2022-05-29 NOTE — ED NOTES
Dr Dottie Zarco and Dr. Moreno Headings at bedside for bridle placement.      Concepción Danielle RN  05/29/22 7241

## 2022-05-29 NOTE — ED PROVIDER NOTES
[facility] ED  Emergency Department Encounter  EmergencyMedicine Resident     Pt Name:Ba Brink  MRN: [id number]  [place] 1945  Date of evaluation: 5/29/22  PCP:  No primary care provider on file. This patient was evaluated in the Emergency Department for symptoms described in the history of present illness. The patient was evaluated in the context of the global COVID-19 pandemic, which necessitated consideration that the patient might be at risk for infection with the SARS-CoV-2 virus that causes COVID-19. Institutional protocols and algorithms that pertain to the evaluation of patients at risk for COVID-19 are in a state of rapid change based on information released by regulatory bodies including the CDC and federal and state organizations. These policies and algorithms were followed during the patient's care in the ED. CHIEF COMPLAINT       Chief Complaint   Patient presents with    Feeding Tube Problem     NG removed by patient accidentally at [facility]  (Location/Symptom, Timing/Onset, Context/Setting, Quality, Duration, Modifying Factors, Severity.)      Rosario Gonzalez is a  77 y.o. male who presents via EMS from [address] for NG replacement. He has been there for 2 days since being discharged from the hospital (15-27 May) for syncope. Was found to have untreated Parkinsons and chronic alcohol abuse. Tested pos for COVID during his admission. Failed swallow eval multiple times during admission and plan was to d/c to nursing facility with NG x1 week and then repeat swallow, PEG planned if fails again. This am his nurse relays that he pulled the NG. No other concerns, he has otherwise been doing well from their perspective. Was discharged on 5 days augmentin for bacterial/aspiration pna, was on Zosyn during admission. Completed 5 day remdesivir course inpt.      PAST MEDICAL / SURGICAL / SOCIAL / FAMILY HISTORY      has no past medical history on file. has no past surgical history on file. Social History     Socioeconomic History    Marital status: Single     Spouse name: Not on file    Number of children: Not on file    Years of education: Not on file    Highest education level: Not on file   Occupational History    Not on file   Tobacco Use    Smoking status: Former Smoker    Smokeless tobacco: Never Used   Substance and Sexual Activity    Alcohol use: Not Currently    Drug use: Not Currently    Sexual activity: Not on file   Other Topics Concern    Not on file   Social History Narrative    Not on file     Social Determinants of Health     Financial Resource Strain:     Difficulty of Paying Living Expenses: Not on file   Food Insecurity:     Worried About [address] in the Last Year: Not on file    Ashley of Food in the Last Year: Not on file   Transportation Needs:     Lack of Transportation (Medical): Not on file    Lack of Transportation (Non-Medical): Not on file   Physical Activity:     Days of Exercise per Week: Not on file    Minutes of Exercise per Session: Not on file   Stress:     Feeling of Stress : Not on file   Social Connections:     Frequency of Communication with Friends and Family: Not on file    Frequency of Social Gatherings with Friends and Family: Not on file    Attends Scientology Services: Not on file    Active Member of [address] [place] [address] [employer] or Organizations: Not on file    Attends Club or Organization Meetings: Not on file    Marital Status: Not on file   Intimate Partner Violence:     Fear of Current or Ex-Partner: Not on file    Emotionally Abused: Not on file    Physically Abused: Not on file    Sexually Abused: Not on file   Housing Stability:     Unable to Pay for Housing in the Last Year: Not on file    Number of [place] in the Last Year: Not on file    Unstable Housing in the Last Year: Not on file       History reviewed.  No pertinent family history. Allergies:  Penicillin g    Home Medications:  Prior to Admission medications    Medication Sig Start Date End Date Taking? Authorizing Provider   amLODIPine (NORVASC) 10 MG tablet 1 tablet by Per NG tube route daily 5/27/22 6/26/22  Andre Barnett MD   carbidopa-levodopa (SINEMET)  MG per tablet 1 tablet by Per NG tube route 3 times daily 5/26/22 7/25/22  Andre Barnett MD   sennosides-docusate sodium (SENOKOT-S) 8.6-50 MG tablet 2 tablets by Per NG tube route daily for 15 days 5/26/22 6/10/22  Andre Barnett MD   Nutritional Supplements (BOOST 100 CALORIE SMART) LIQD 1 Bottle by Nasogastric route in the morning and at bedtime 5/26/22 8/24/22  Andre Barnett MD   ferrous sulfate (FE TABS) 325 (65 Fe) MG EC tablet Take 1 tablet by mouth daily (with breakfast) 5/26/22 8/24/22  Andre Barnett MD   amoxicillin-clavulanate (AUGMENTIN) 875-125 MG per tablet 1 tablet by Per NG tube route 2 times daily for 5 days 5/26/22 5/31/22  Andre Barnett MD   Ergocalciferol (VITAMIN D) 24111 units CAPS Take 50,000 Units by mouth once a week for 7 doses 5/25/22 7/7/22  Je Adhikari MD       REVIEW OF SYSTEMS    (2-9 systems for level 4, 10 or more for level 5)      Review of Systems   Constitutional: Negative for activity change and appetite change. HENT: Positive for congestion. Negative for sore throat. Respiratory: Positive for cough and choking. Negative for shortness of breath. Cardiovascular: Negative for chest pain and leg swelling. Gastrointestinal: Negative for abdominal pain, diarrhea, nausea and vomiting. Psychiatric/Behavioral: Positive for confusion. Negative for decreased concentration. PHYSICAL EXAM   (up to 7 for level 4, 8 or more for level 5)      INITIAL VITALS:   BP (!) 133/110   Pulse 74   Temp 98.4 °F (36.9 °C) (Oral)   Resp 19   Wt 137 lb (62.1 kg)   SpO2 96%   BMI 20.23 kg/m²     Physical Exam  Constitutional:       General: He is not in acute distress. Appearance: He is ill-appearing. He is not toxic-appearing. HENT:      Head: Normocephalic and atraumatic. Nose: Nose normal.      Mouth/Throat:      Mouth: Mucous membranes are dry. Eyes:      Extraocular Movements: Extraocular movements intact. Pupils: Pupils are equal, round, and reactive to light. Cardiovascular:      Rate and Rhythm: Normal rate and regular rhythm. Pulses: Normal pulses. Heart sounds: Normal heart sounds. Pulmonary:      Effort: Pulmonary effort is normal.      Breath sounds: Rhonchi present. Abdominal:      General: Abdomen is flat. Bowel sounds are normal.      Palpations: Abdomen is soft. Tenderness: There is no abdominal tenderness. Musculoskeletal:         General: Normal range of motion. Cervical back: Normal range of motion. Skin:     General: Skin is warm and dry. Neurological:      Mental Status: He is alert. Mental status is at baseline. Comments: Resting Parkinson tremor   Psychiatric:         Mood and Affect: Mood normal.         Behavior: Behavior normal.         Judgment: Judgment normal.         INITIAL IMPRESSION / DIFFERENTIAL  DIAGNOSIS / PLAN     INITIAL IMPRESSION / DDX:   78-year-old male with need for NG tube replacement, will replace and bridle, return to nursing. When getting confirmatory abd xray will complete cxr as well given recent treatment for pna. EMERGENCY DEPARTMENT COURSE:  ED Course as of 05/29/22 2111   Leela Velasquez May 29, 2022   1328 No answer at home number [SM]   1329 Limited report from EMS from facility - called [facility] and spoke to his nurse - has been at the facility since Friday, failed swallow study inpt several times, plan for NG and repeat swallow in a week, then poss PEG. Pulled NG out this am, sent from facility to have it replaced. Brother is medical decision maker [SM]   200 Spoke with brother, confirmed plan to have NG replaced and return to facility.   [SM]   1334 Bridle placed, will confirm placement with XR, will eval chest as well given concern for aspiration previously and cough [SM]   1613 Stable chest xr [SM]   1855 Attempted to withdraw 15-20cm and re-advance, meeting resistance at [address] [SM]   1918 Dr. Javier Johns also attempt to readjust and advance, meeting resistance and not able to withdraw stomach contents [SM]   1950 Spoke to nurse about removing NG as it is not in good location despite multiple attempts, don't want it to be used overnight []      ED Course User Index  [SM] Kailash Escobar MD       PLAN (Meagan Bravo / Adeline Jimenez / EKG):  Orders Placed This Encounter   Procedures    COVID-19, Rapid    XR ABDOMEN FOR NG/OG/NE TUBE PLACEMENT    XR CHEST PORTABLE    XR ABDOMEN FOR NG/OG/NE TUBE PLACEMENT    XR ABDOMEN FOR NG/OG/NE TUBE PLACEMENT    CBC with Auto Differential    Basic Metabolic Panel w/ Reflex to MG    Magnesium    Diet NPO    Tube insertion NG    Vital signs per unit routine    Notify physician    Notify physician    Up with assistance    Full Code    Inpatient consult to Internal Medicine    ADMIT TO INPATIENT       MEDICATIONS ORDERED:  Orders Placed This Encounter   Medications    lidocaine (XYLOCAINE) 2 % jelly    DISCONTD: 0.9 % sodium chloride bolus    0.9 % sodium chloride infusion    sodium chloride flush 0.9 % injection 5-40 mL    sodium chloride flush 0.9 % injection 5-40 mL    0.9 % sodium chloride infusion    enoxaparin (LOVENOX) injection 40 mg     Order Specific Question:   Indication of Use     Answer:   Prophylaxis-DVT/PE    OR Linked Order Group     ondansetron (ZOFRAN-ODT) disintegrating tablet 4 mg     ondansetron (ZOFRAN) injection 4 mg       DIAGNOSTIC RESULTS / PROCEDURES / CONSULTS   LAB RESULTS:  Results for orders placed or performed during the hospital encounter of 05/29/22   COVID-19, Rapid    Specimen: Nasopharyngeal Swab   Result Value Ref Range    Specimen Description . NASOPHARYNGEAL SWAB     SARS-CoV-2, Rapid DETECTED (A) Not Detected   CBC with Auto Differential   Result Value Ref Range    WBC 6.7 3.5 - 11.3 k/uL    RBC 2.94 (L) 4.21 - 5.77 m/uL    Hemoglobin 8.9 (L) 13.0 - 17.0 g/dL    Hematocrit 27.5 (L) 40.7 - 50.3 %    MCV 93.5 82.6 - 102.9 fL    MCH 30.3 25.2 - 33.5 pg    MCHC 32.4 28.4 - 34.8 g/dL    RDW 14.7 (H) 11.8 - 14.4 %    Platelets 312 (L) 327 - 453 k/uL    MPV 10.7 8.1 - 13.5 fL    NRBC Automated 0.0 0.0 per 100 WBC    Seg Neutrophils 71 (H) 36 - 65 %    Lymphocytes 16 (L) 24 - 43 %    Monocytes 8 3 - 12 %    Eosinophils % 3 1 - 4 %    Basophils 1 0 - 2 %    Immature Granulocytes 1 (H) 0 %    Segs Absolute 4.79 1.50 - 8.10 k/uL    Absolute Lymph # 1.10 1.10 - 3.70 k/uL    Absolute Mono # 0.54 0.10 - 1.20 k/uL    Absolute Eos # 0.17 0.00 - 0.44 k/uL    Basophils Absolute 0.04 0.00 - 0.20 k/uL    Absolute Immature Granulocyte 0.06 0.00 - 0.30 k/uL    RBC Morphology ANISOCYTOSIS PRESENT    Basic Metabolic Panel w/ Reflex to MG   Result Value Ref Range    Glucose 73 70 - 99 mg/dL    BUN 14 8 - 23 mg/dL    CREATININE 0.78 0.70 - 1.20 mg/dL    Calcium 7.8 (L) 8.6 - 10.4 mg/dL    Sodium 138 135 - 144 mmol/L    Potassium 3.9 3.7 - 5.3 mmol/L    Chloride 104 98 - 107 mmol/L    CO2 25 20 - 31 mmol/L    Anion Gap 9 9 - 17 mmol/L    GFR Non-African American >60 >60 mL/min    GFR African American >60 >60 mL/min    GFR Comment         Magnesium   Result Value Ref Range    Magnesium 1.9 1.6 - 2.6 mg/dL       RADIOLOGY:  XR CHEST PORTABLE    Result Date: 5/29/2022  EXAMINATION: ONE XRAY VIEW OF THE CHEST 5/29/2022 12:41 pm COMPARISON: 05/24/2022 HISTORY: ORDERING SYSTEM PROVIDED HISTORY: covid, cough TECHNOLOGIST PROVIDED HISTORY: covid, cough FINDINGS: There is suboptimal inspiration. NG tube tip is not well seen but appears to be in the region of the GE junction. The cardiac size is normal. Stable bibasal opacities and small bilateral pleural effusions. .  Pulmonary vascularity appears stable.   There is mild ectasia of the thoracic aorta. There are degenerative changes in the spine and shoulders with chronic rotator cuff tears bilaterally. No acute bony abnormalities. The hilar structures are normal.     NG tube tip is not well seen but appears to be in the region of the GE junction. Consider advancing NG tube by 5 cm Stable bibasal opacities and small bilateral pleural effusions. .     XR ABDOMEN FOR NG/OG/NE TUBE PLACEMENT    Result Date: 5/29/2022  EXAMINATION: ONE SUPINE XRAY VIEW(S) OF THE ABDOMEN 5/29/2022 6:04 pm COMPARISON: 05/29/2022 HISTORY: ORDERING SYSTEM PROVIDED HISTORY: Confirmation of course of NG/OG/NE tube and location of tip of tube, has been advanced from 50 to 55 to 60 TECHNOLOGIST PROVIDED HISTORY: Confirmation of course of NG/OG/NE tube and location of tip of tube, has been advanced from 50 to 55 to 60 Portable? ->Yes FINDINGS: There is a nasogastric tube in place with the tip along the lower mediastinum which is probably just above the EG junction and is not significantly changed in position. There is some dilated loops of bowel along the upper abdomen which is less prominent. There are some hazy bibasilar opacities which is less prominent. Nasogastric tube tip along the distal esophagus which is not significantly changed in position. Recommend readjusting the tube into the distal stomach Dilated loops of bowel along the upper abdomen which is less prominent Hazy bibasilar opacities which is less prominent. XR ABDOMEN FOR NG/OG/NE TUBE PLACEMENT    Result Date: 5/29/2022  EXAMINATION: ONE SUPINE XRAY VIEW(S) OF THE ABDOMEN 5/29/2022 4:54 pm COMPARISON: 05/29/2022 at 15:14 hours HISTORY: ORDERING SYSTEM PROVIDED HISTORY: Confirmation of course of NG/OG/NE tube and location of tip of tube, repeat after advancement TECHNOLOGIST PROVIDED HISTORY: Confirmation of course of NG/OG/NE tube and location of tip of tube, repeat after advancement Portable? ->Yes FINDINGS: There is a nasogastric tube in place with the tip at the level of the EG junction. There are some dilated loops of bowel along the upper abdomen which is more prominent. ET tube tip remains at the EG junction. Recommend readjusting the tip into the distal stomach as described previously. Mildly dilated loops of bowel throughout the upper abdomen. XR ABDOMEN FOR NG/OG/NE TUBE PLACEMENT    Result Date: 5/29/2022  EXAMINATION: ONE SUPINE XRAY VIEW(S) OF THE ABDOMEN 5/29/2022 12:41 pm COMPARISON: 05/26/2022 HISTORY: ORDERING SYSTEM PROVIDED HISTORY: Confirmation of course of NG/OG/NE tube and location of tip of tube TECHNOLOGIST PROVIDED HISTORY: Confirmation of course of NG/OG/NE tube and location of tip of tube Portable? ->Yes FINDINGS: There is an enteric tube with its tip projecting over the GE junction Proximal port is in the lower esophagus     Enteric tube as described above. Recommend advancing 5 cm     CONSULTS:  IP CONSULT TO INTERNAL MEDICINE      FINAL IMPRESSION      1. Encounter for nasogastric tube placement    2. Aspiration pneumonia of both lungs, unspecified aspiration pneumonia type, unspecified part of lung ([address]          DISPOSITION / PLAN     DISPOSITION    Admit for IV hydration, re-attempt NG placement tomorrow/PEG     PATIENT REFERRED TO:  No follow-up provider specified.     DISCHARGE MEDICATIONS:  New Prescriptions    No medications on file       Steph Perez MD  Emergency Medicine Resident    (Please note that portions of thisnote were completed with a voice recognition program.  Efforts were made to edit the dictations but occasionally words are mis-transcribed.)     Chase Ramirez MD  Resident  05/29/22 3810

## 2022-05-29 NOTE — ED NOTES
Patient is resting on cart, RR even and unlabored. Side rails up x2, call light within reach, will continue with plan of care. Await repeat xray to confirm placement of NG with advancement.      Jo Kearns RN  05/29/22 3549

## 2022-05-30 ENCOUNTER — APPOINTMENT (OUTPATIENT)
Dept: GENERAL RADIOLOGY | Age: 77
DRG: 391 | End: 2022-05-30
Payer: OTHER GOVERNMENT

## 2022-05-30 PROCEDURE — 99222 1ST HOSP IP/OBS MODERATE 55: CPT | Performed by: INTERNAL MEDICINE

## 2022-05-30 PROCEDURE — 74230 X-RAY XM SWLNG FUNCJ C+: CPT

## 2022-05-30 PROCEDURE — 92611 MOTION FLUOROSCOPY/SWALLOW: CPT

## 2022-05-30 PROCEDURE — 1200000000 HC SEMI PRIVATE

## 2022-05-30 PROCEDURE — 2580000003 HC RX 258: Performed by: STUDENT IN AN ORGANIZED HEALTH CARE EDUCATION/TRAINING PROGRAM

## 2022-05-30 PROCEDURE — 6360000002 HC RX W HCPCS: Performed by: HEALTH CARE PROVIDER

## 2022-05-30 PROCEDURE — 99232 SBSQ HOSP IP/OBS MODERATE 35: CPT | Performed by: STUDENT IN AN ORGANIZED HEALTH CARE EDUCATION/TRAINING PROGRAM

## 2022-05-30 RX ORDER — DEXTROSE AND SODIUM CHLORIDE 5; .9 G/100ML; G/100ML
INJECTION, SOLUTION INTRAVENOUS CONTINUOUS
Status: DISCONTINUED | OUTPATIENT
Start: 2022-05-30 | End: 2022-06-02

## 2022-05-30 RX ADMIN — DEXTROSE AND SODIUM CHLORIDE: 5; 900 INJECTION, SOLUTION INTRAVENOUS at 08:49

## 2022-05-30 RX ADMIN — ENOXAPARIN SODIUM 40 MG: 100 INJECTION SUBCUTANEOUS at 08:49

## 2022-05-30 ASSESSMENT — ENCOUNTER SYMPTOMS
COLOR CHANGE: 0
EYE DISCHARGE: 0
APNEA: 0
ABDOMINAL DISTENTION: 0

## 2022-05-30 NOTE — PROCEDURES
INSTRUMENTAL SWALLOW REPORT  MODIFIED BARIUM SWALLOW    NAME: Fuad Silver   : 1945  MRN: [id number]       Date of Eval: 2022              Referring Diagnosis(es):      Past Medical History:  has no past medical history on file. Past Surgical History:  has no past surgical history on file. Type of Study: Repeat MBS         Patient Complaints/Reason for Referral:  Faud Silver was referred for a MBS to assess the efficiency of his/her swallow function, assess for aspiration, and to make recommendations regarding safe dietary consistencies, effective compensatory strategies, and safe eating environment. Onset of problem:      The patient is a pleasant 77 y.o. male was brought by the EMS from [address] [place] [address] for NG placement. Patient is a very poor historian. Has Parkinson's disease. Could not obtain any history from the patient.     Was recently admitted to the hospital on 15th May and discharged on 27th May, presented during that time with syncopal, found to be orthostatics positive. Discharged with Holter. There was a concern for aspiration due to laryngeal muscle weakness, failed swallow studies, due to Parkinson disease versus acute CVA, was treated with Augmentin for 5 days, was discharged with NG tube. Was COVID-positive during that admission. Got remdesivir.     Today patient actually pulled NG tube out, and there was hard time getting it in place. Attempts were made to place NG tube, but did not pass in the GE junction as per x-rays is coiling In the esophagus.   Multiple attempts were made after withdrawing and then really advancing meeting resistance and not able to withdraw any stomach contents, eventually remove the NG.     Patient is on carbidopa levodopa for parkinsonism, on Norvasc 10 mg for essential hypertension, on ferrous sulfate for iron deficiency anemia          Behavior/Cognition/Vision/Hearing:  Behavior/Cognition: Alert; Cooperative    Impressions:  Pt. Presents with + penetration with Puree and nectar consistencies with + stasis in the laryngeal vestibule and + nasopharyngeal penetration with stasis in the nasopharynx. Pt. With decreased laryngeal elevation, decreased pharyngeal constrictions, decreased epiglottic inversion and decreased cricopharyngeal opening. Pt. With moderate vallec and pyriform residual with increased chance of aspiration with subsequent swallows. After test concluded pt with wet vocal quality and wet coughing noted. Recommend NPO with alternative means of nutrition at this time. Patient Position: Lateral      Consistencies Administered: Pureed;Mildly Thick teaspoon      Dysphagia Outcome Severity Scale: Level 1: Severe dysphagia- NPO. Unable to tolerate any PO safely  Penetration-Aspiration Scale (PAS): 3 - Material enters the airway, remains above the vocal folds, and is not ejected from airway    Recommended Diet:  Solid consistency: NPO  Liquid consistency: NPO         Recommendations/Treatment  Requires SLP Intervention: Yes        D/C Recommendations: Ongoing speech therapy is recommended during this hospitalization; Ongoing speech therapy is recommended at next level of care  Postural Changes and/or Swallow Maneuvers: Upright 90 degrees      Recommended Exercises:    Therapeutic Interventions: Laryngeal exercises; Pharyngeal exercises         Education: Images and recommendations were reviewed with pt following this exam.   Patient Education: yes  Patient Education Response: Needs reinforcement           Goals:    Long Term:     To Maximize safety with intake, optimize nutrition/hydration and minimize risk for aspiration. Short Term:     Goal 1: O/M treatment program for dysphagia      Oral Preparation / Oral Phase:  Decreased bolus formation with spillover prior to swallow initiation.      Pharyngeal Phase: Puree and Nectar: + penetration with + stasis in the larygreal vestibule with + mod vallec and pyriform stasis which does not clear.  + nasopharyngeal penetration noted   After test conclusion pt. With + wet vocal quality and excessive wet coughing noted.       Esophageal Phase  Esophageal Screen: Impaired  Upper Esophageal Screen- Major Contributing Deficits  Major Contributing Deficits: Reduced Cricopharyngeal Opening        Pain      Pain Level: 0      Therapy Time:   Individual Concurrent Group Co-treatment   Time In  1100         Time Out  1115         Minutes  1983 [facility], 5/30/2022, 11:47 AM

## 2022-05-30 NOTE — CARE COORDINATION
Case Management Initial Discharge Plan  Eliseo Michelle,             Met with:patient to discuss discharge plans. Information verified: address, contacts, phone number, , insurance Yes  Insurance Provider: Medicare/VACCN  [place]: Yes    Emergency Contact/Next of Kin name & number: brother Keith Jones [phone]/Cousin Loly Lucio [phone]  Who are involved in patient's support system? Brother Keith Jones and cousin    PCP: [place] [facility] [address]  Date of last visit: 3 weeks      Discharge Planning    Living Arrangements:    Lives with brother Keith Jones but notes state pt was at [facility] has 1 stories  2 stairs to climb to get into front door, 0 stairs to climb to reach second floor  Location of bedroom/bathroom in home 1st floor    Patient able to perform ADL's:Assisted    Current Services (outpatient & in home) SNF  DME equipment: wheelchair & walker  DME provider: n/a  States he doesn't need Oxygen    Is patient receiving oral anticoagulation therapy? No    If indicated:   Physician managing anticoagulation treatment:   Where does patient obtain lab work for ATC treatment? Does patient have any issues/concerns obtaining medications? No  If yes, what are patient's concerns? Is there a preferred Pharmacy after hours or on weekends? Yes    If yes, which pharmacy? [facility] Needed:   SNF    Patient agreeable to home care: Yes  Freedom of choice provided:  appears to need to return to SNF placement    Prior SNF/Rehab Placement and Facility: yes [facility], does not want to return there  Agreeable to SNF/Rehab: Yes  [place] of choice provided: will provide     Evaluation: n/a    Expected Discharge date:       Patient expects to be discharged to:   SNF    If home: is the family and/or caregiver wiling & able to provide support at home? Lives with brother Keith Jones  Who will be providing this support?  Brother Keith Jones but will need to return to SNF    Follow Up Appointment: Best Day/ Time:      Transportation provider: [address] [place] [address] arrangements needed for discharge: Yes    Readmission Risk              Risk of Unplanned Readmission:  17             Does patient have a readmission risk score greater than 14?: Yes  If yes, follow-up appointment must be made within 7 days of discharge. Goals of Care: safety      Educated patient on transitional options, provided freedom of choice and are agreeable with plan      Discharge Plan:  Plan is discharge to new SNF. Will need transport.           Electronically signed by Woody Torres RN on 5/30/22 at 5:00 PM EDT

## 2022-05-30 NOTE — ED NOTES
Monitor showing spO2 in the mid 80's. Nurse to room to evaluate as waveform not steady. SpO2 moved to great toe. SpO2 back to 99%. Oxygen set at 2L.       Deepa Mosqueda LPN  84/60/97 [id number]

## 2022-05-30 NOTE — PLAN OF CARE
Problem: Skin/Tissue Integrity  Goal: Absence of new skin breakdown  Description: 1. Monitor for areas of redness and/or skin breakdown  2. Assess vascular access sites hourly  3. Every 4-6 hours minimum:  Change oxygen saturation probe site  4. Every 4-6 hours:  If on nasal continuous positive airway pressure, respiratory therapy assess nares and determine need for appliance change or resting period.   Outcome: Progressing     Problem: Safety - Adult  Goal: Free from fall injury  Outcome: Progressing     Problem: Nutrition Deficit:  Goal: Optimize nutritional status  Outcome: Progressing

## 2022-05-30 NOTE — PROGRESS NOTES
Comprehensive Nutrition Assessment    Type and Reason for Visit:  Positive Nutrition Screen (enteral nutrition)    Nutrition Recommendations/Plan:   1. Monitor plans for feeding tube replacement  2. Once able, restart tube feeding of Osmolite 1.2 (Standard w/o fiber) at goal rate of 60ml/hr to provide 1728 kcal, 80gm protein     Malnutrition Assessment:  Malnutrition Status:  Insufficient data (05/30/22 1337)    Context:  Chronic Illness         Nutrition Assessment:    Pt admitted d/t NG tube needing replaced after pt pulled. Unable to replace NG since admit, noted plans for possible PEG tube. Recently discharged from hospital on 5/27-was discharged on tube feedings. Swallow study today-recommend NPO. Nutrition Related Findings:    labs/meds reviewed Wound Type: None       Current Nutrition Intake & Therapies:    Average Meal Intake: NPO     Diet NPO  Additional Calorie Sources:  · D5% and 0.9% NaCl at 75ml/at=192 kcal/d      Anthropometric Measures:  Height: 5' 9.02\" (175.3 cm)  Ideal Body Weight (IBW): 160 lbs (73 kg)       Current Body Weight: 136 lb 14.5 oz (62.1 kg), 85.6 % IBW.     Current BMI (kg/m2): 20.2                          BMI Categories: Underweight (BMI less than 22) age over 72    Estimated Daily Nutrient Needs:  Energy Requirements Based On: Kcal/kg  Weight Used for Energy Requirements: Current  Energy (kcal/day): 6705-5093 kcal/d (28-30 kcal/kg)  Weight Used for Protein Requirements: Current  Protein (g/day): 75-90gm pro/d (1.2-1.4gm/kg)  Method Used for Fluid Requirements: 1 ml/kcal  Fluid (ml/day): 1700-1900ml/d    Nutrition Diagnosis:   · Inadequate oral intake related to swallowing difficulty as evidenced by nutrition support - enteral nutrition      Nutrition Interventions:   Food and/or Nutrient Delivery: Continue NPO,Start Tube Feeding  Nutrition Education/Counseling: No recommendation at this time  Coordination of Nutrition Care: Continue to monitor while inpatient Goals:  Previous Goal Met:  (Goal set)  Goals: Meet at least 75% of estimated needs,prior to discharge       Nutrition Monitoring and Evaluation:   Behavioral-Environmental Outcomes: None Identified  Food/Nutrient Intake Outcomes: Enteral Nutrition Intake/Tolerance  Physical Signs/Symptoms Outcomes: Biochemical Data,Nutrition Focused Physical Findings,Skin,Weight,GI Status    Discharge Planning:     Too soon to determine     Rima Yeager RD, LD  Contact: [phone]

## 2022-05-30 NOTE — PROGRESS NOTES
[facility]  Internal Medicine Teaching Residency Program  Inpatient Daily Progress Note  ______________________________________________________________________________    Patient: Montel Merlin  YOB: 1945   [facility]:[id number]    Acct: [de-identified]     Room: [id number]  Admit date: 5/29/2022  Today's date: 05/30/22  Number of days in the hospital: 1    SUBJECTIVE   Admitting Diagnosis: Dehydration  CC:   Chief Complaint   Patient presents with    Feeding Tube Problem     NG removed by patient accidentally at [facility]       Pt examined at bedside. Chart & results reviewed. No acute events overnight. Patient is afebrile and hemodynamically stable. Vital signs are stable. Patient is awake and alert, oriented into 3. He is saturating 95% on 3 L nasal cannula. Patient is getting a swallow study today. Plan is to place a PEG tube. ROS:  Constitutional:  negative for chills, fevers, sweats  Respiratory:  negative for cough, dyspnea on exertion, hemoptysis, shortness of breath, wheezing  Cardiovascular:  negative for chest pain, chest pressure/discomfort, lower extremity edema, palpitations  Gastrointestinal:  negative for abdominal pain, constipation, diarrhea, nausea, vomiting  Neurological:  negative for dizziness, headache  BRIEF HISTORY        The patient is a pleasant 77 y.o. male was brought by the EMS from [address] [place] [address] for NG placement. Patient is a very poor historian. Has Parkinson's disease. Could not obtain any history from the patient.     Was recently admitted to the hospital on 15th May and discharged on 27th May, presented during that time with syncopal, found to be orthostatics positive. Discharged with Holter.   There was a concern for aspiration due to laryngeal muscle weakness, failed swallow studies, due to Parkinson disease versus acute CVA, was treated with Augmentin for 5 days, was discharged with NG tube. Was COVID-positive during that admission. Got remdesivir.     Today patient actually pulled NG tube out, and there was hard time getting it in place. Attempts were made to place NG tube, but did not pass in the GE junction as per x-rays is coiling In the esophagus. Multiple attempts were made after withdrawing and then really advancing meeting resistance and not able to withdraw any stomach contents, eventually remove the NG.     Patient is on carbidopa levodopa for parkinsonism, on Norvasc 10 mg for essential hypertension, on ferrous sulfate for iron deficiency anemia     Last Echo - May 2022 shows LVEF of greater than 55%     ED Course: On arrival to the ED patient was afebrile  Heart rate in 70 s  Hypertensive  Hypoxic, placed on nasal cannula     Initial labs Showed -   Sodium 138  Potassium 3.8  Cr 0.78  Calcium 7.8  Wbc 6.7  Hb 8.9     COVID positive  CXR showed stable B/L opacities    OBJECTIVE     Vital Signs:  BP (!) 151/62   Pulse 76   Temp 98.2 °F (36.8 °C) (Oral)   Resp 18   Wt 137 lb (62.1 kg)   SpO2 96%   BMI 20.23 kg/m²     Temp (24hrs), Av.3 °F (36.8 °C), Min:98.2 °F (36.8 °C), Max:98.4 °F (36.9 °C)    No intake/output data recorded. Physical Exam:  Constitutional: This is a well developed, well nourished, 18.5-24.9 - Normal 77y.o. year old male who is alert, oriented, cooperative and in no apparent distress. Head:normocephalic and atraumatic. EENT:  PERRLA. No conjunctival injections. Septum was midline, mucosa was without erythema, exudates or cobblestoning. No thrush was noted. Neck: Supple without thyromegaly. No elevated JVP. Trachea was midline. Respiratory: Chest was symmetrical without dullness to percussion. Rhonchi heard bilaterally. There is no intercostal retraction or use of accessory muscles. No egophony noted. Cardiovascular: Regular without murmur, clicks, gallops or rubs. Abdomen: Slightly rounded and soft without organomegaly.  No rebound, rigidity or guarding was appreciated. Lymphatic: No lymphadenopathy. Musculoskeletal: Normal curvature of the spine. No gross muscle weakness. Extremities:  No lower extremity edema, ulcerations, tenderness, varicosities or erythema. Muscle size, tone and strength are normal.  No involuntary movements are noted. Skin:  Warm and dry. Good color, turgor and pigmentation. No lesions or scars. No cyanosis or clubbing  Neurological/Psychiatric: The patient's general behavior, level of consciousness, thought content and emotional status is normal.        Medications:  Scheduled Medications:    sodium chloride flush  5-40 mL IntraVENous 2 times per day    enoxaparin  40 mg SubCUTAneous Daily    amLODIPine  10 mg Per NG tube Daily    carbidopa-levodopa  1 tablet Per NG tube TID     Continuous Infusions:    sodium chloride 100 mL/hr at 05/29/22 1936    sodium chloride       PRN Medicationssodium chloride flush, 5-40 mL, PRN  sodium chloride, , PRN  ondansetron, 4 mg, Q8H PRN   Or  ondansetron, 4 mg, Q6H PRN        Diagnostic Labs:  CBC:   Recent Labs     05/29/22 1933   WBC 6.7   RBC 2.94*   HGB 8.9*   HCT 27.5*   MCV 93.5   RDW 14.7*   *     BMP:   Recent Labs     05/29/22 1933      K 3.9      CO2 25   BUN 14   CREATININE 0.78     BNP: No results for input(s): BNP in the last 72 hours. PT/INR: No results for input(s): PROTIME, INR in the last 72 hours. APTT: No results for input(s): APTT in the last 72 hours. CARDIAC ENZYMES: No results for input(s): CKMB, CKMBINDEX, TROPONINI in the last 72 hours. Invalid input(s): CKTOTAL;3  FASTING LIPID PANEL:No results found for: CHOL, HDL, TRIG  LIVER PROFILE: No results for input(s): AST, ALT, ALB, BILIDIR, BILITOT, ALKPHOS in the last 72 hours.    MICROBIOLOGY:   Lab Results   Component Value Date/Time    CULTURE NO GROWTH 5 DAYS 05/17/2022 09:05 AM       Imaging:    XR CHEST PORTABLE    Result Date: 5/29/2022  NG tube tip is not well seen but appears to be in the region of the GE junction. Consider advancing NG tube by 5 cm Stable bibasal opacities and small bilateral pleural effusions. .     XR CHEST PORTABLE    Result Date: 5/24/2022  Stable bibasilar parenchymal opacities and bilateral pleural effusions. XR ABDOMEN FOR NG/OG/NE TUBE PLACEMENT    Result Date: 5/29/2022  Nasogastric tube tip along the distal esophagus which is not significantly changed in position. Recommend readjusting the tube into the distal stomach Dilated loops of bowel along the upper abdomen which is less prominent Hazy bibasilar opacities which is less prominent. XR ABDOMEN FOR NG/OG/NE TUBE PLACEMENT    Result Date: 5/29/2022  ET tube tip remains at the EG junction. Recommend readjusting the tip into the distal stomach as described previously. Mildly dilated loops of bowel throughout the upper abdomen. XR ABDOMEN FOR NG/OG/NE TUBE PLACEMENT    Result Date: 5/29/2022  Enteric tube as described above. Recommend advancing 5 cm     XR ABDOMEN FOR NG/OG/NE TUBE PLACEMENT    Result Date: 5/26/2022  NG tube position, as detailed     XR ABDOMEN FOR NG/OG/NE TUBE PLACEMENT    Result Date: 5/26/2022  Suboptimally positioned nasogastric tube with tip projected at the distal esophagus. RECOMMENDATION: Advance the nasogastric tube at least 7-8 cm, then reimage to reassess tube position     XR ABDOMEN FOR NG/OG/NE TUBE PLACEMENT    Result Date: 5/26/2022  Intestinal tube is looped within a hiatal hernia above the diaphragm. Mild gaseous distension of the intestinal tract. The findings were sent to the Radiology Results [address] at 9:45 am on 5/26/2022 to be communicated to a licensed caregiver. RECOMMENDATION: Reposition intestinal tube. FL MODIFIED BARIUM SWALLOW W VIDEO    Result Date: 5/24/2022  1. Penetration followed by aspiration with the nectar thick substance.  2. No pureed/pudding thick substance was swallowed into it was mixed with the nectar thick substance. 3. Remainder of the substances not attempted. Please see separate speech pathology report for full discussion of findings and recommendations. RECOMMENDATIONS: Unavailable       ASSESSMENT & PLAN     ASSESSMENT / PLAN:     IMPRESSION  This is a 77 y.o. male who presented with above mentioned complaints and was admitted to inpatient service for further management as follows:     Principal Problem:    Dehydration  Active Problems:    Parkinson's disease ([address]    COVID-19    Cervical myelopathy (HCC)    Hypertension    Iron deficiency anemia    Moderate protein-calorie malnutrition ([address]  Resolved Problems:    * No resolved hospital problems. *         1. Dysphagia -   - NG pulled out   - multiple failed attempts  -Swallow study  done. Impression   Grossly abnormal swallowing mechanism with residue, decreased epiglottic   inversion, penetration with stasis in the laryngeal vestibular area,   nasopharyngeal retrograde penetration.  Excessive coughing after the test was   completed.  Tongue fasciculations noted with transfer of material.       - Plan is for a PEG tube. -  Need to discuss with family.     2. Acute Hypoxic respiratory failure -   - likely due to COVID 19 infection  - continue supplemental oxygen   - Wean off as tolerated     3. parkinson's disease -   - on carbidopa-levodopa TID per NG  - held for now  - will resume      4. Essential hypertension -   - Norvasc 10 mg daily     5. Iron deficiency anemia -   - continue oral iron supplements after NG is put in     6. Cervical myelopathy     7. COVID 19 infection -  - s/p remdesivir     8. Alcohol abuse -   - will start thiamine, folate and multivitamin     Diet:    NPO  DVT ppx: lovenox  GI ppx: not indicated     PT/OT/SW - consulted  Discharge Planning - consulted        Michaela Anguiano MD  Internal Medicine Resident, PGY-1  [place], [place]  5/30/2022, 8:02 AM

## 2022-05-30 NOTE — CONSULTS
[facility] -   Infectious diseases evaluation  admission date 5/29/2022    reason for consultation:   covid     Impression :   Current:  · covid + 5/17 - treated  - out of isolation  · Dysphagia- NGT  · Hypoxemia  · Parkinson  · etoh abuse in hx    Other:  ·   Discussion / summary of stay / plan of care   ·   Recommendations   · Isolation ended 5/27  · Remove isolation and ok for any procedure required, to be done off isolation    Infection Control Recommendations   · [place] Precautions  · Contact Isolation       Antimicrobial Stewardship Recommendations   · Simplification of therapy  · Targeted therapy      History of Present Illness:   Initial history:  Kate Cherry is a 77y.o.-year-old male post syncope and covid 5/17 and treated w remdesevir, test + 5/17, isolated till 5/27 and discharged w spirometer- he was weak at the time. His swall study was + at the time but CXr was stable  CTAP at the time shows many L3 round lesions  CT chest was neg  It was a mild covid    Back w hypoxemia and pulled his NGT and was difficult replacement due to resistance. and could not remove any stomack contents. ID called for isolation clearance due to recent covid    Clinically doing ok - appropriate and on NC  And soft and no rash  Edema both arms        Interval changes  5/30/2022   Patient Vitals for the past 8 hrs:   BP Temp Temp src Pulse Resp SpO2   05/30/22 0849 119/64 98.2 °F (36.8 °C) Oral 77 18 95 %       Summary of relevant labs:  Labs:  W 6.7  Creat 0.7    Micro:  covid + 5/17 and 5/29    Imaging:  CXR 5/29  Stable bibasilar infiltrates and small effusions    swall study 5/30  Grossly abnormal swallowing mechanism with residue, decreased epiglottic inversion, penetration with stasis in the laryngeal vestibular area, nasopharyngeal retrograde penetration. Excessive coughing after the test was completed.  Tongue fasciculations noted with transfer of material.       I have personally reviewed the past medical history, past surgical history, medications, social history, and family history, and I haveupdated the database accordingly. Allergies:   Penicillin g     Review of Systems:     Review of Systems   Constitutional: Positive for activity change. HENT: Negative for congestion. Eyes: Negative for discharge. Respiratory: Negative for apnea. Cardiovascular: Negative for chest pain and leg swelling. Gastrointestinal: Negative for abdominal distention. Endocrine: Negative for cold intolerance. Genitourinary: Negative for dysuria. Musculoskeletal: Negative for arthralgias. Skin: Negative for color change. Allergic/Immunologic: Negative for immunocompromised state. Neurological: Positive for tremors. Negative for dizziness. Hematological: Negative for adenopathy. Psychiatric/Behavioral: Negative for agitation. Physical Examination :       Physical Exam  Constitutional:       Appearance: Normal appearance. He is not ill-appearing. HENT:      Head: Normocephalic and atraumatic. Nose: Nose normal.      Mouth/Throat:      Mouth: Mucous membranes are moist.   Eyes:      General: No scleral icterus. Conjunctiva/sclera: Conjunctivae normal.   Cardiovascular:      Rate and Rhythm: Normal rate and regular rhythm. Heart sounds: Normal heart sounds. No murmur heard. Pulmonary:      Effort: No respiratory distress. Breath sounds: Normal breath sounds. Abdominal:      General: There is no distension. Tenderness: There is no abdominal tenderness. Genitourinary:     Comments: No broussard  Musculoskeletal:         General: No swelling or deformity. Cervical back: Neck supple. No rigidity. Skin:     General: Skin is dry. Coloration: Skin is not jaundiced. Neurological:      Mental Status: He is alert. Mental status is at baseline. Cranial Nerves: No cranial nerve deficit.    Psychiatric:         Mood and Affect: Mood normal.         Past Medical History:   History reviewed. No pertinent past medical history. Past Surgical  History:   History reviewed. No pertinent surgical history. Medications:      sodium chloride flush  5-40 mL IntraVENous 2 times per day    enoxaparin  40 mg SubCUTAneous Daily    amLODIPine  10 mg Per NG tube Daily    carbidopa-levodopa  1 tablet Per NG tube TID       Social History:     Social History     Socioeconomic History    Marital status: Single     Spouse name: Not on file    Number of children: Not on file    Years of education: Not on file    Highest education level: Not on file   Occupational History    Not on file   Tobacco Use    Smoking status: Former Smoker    Smokeless tobacco: Never Used   Substance and Sexual Activity    Alcohol use: Not Currently    Drug use: Not Currently    Sexual activity: Not on file   Other Topics Concern    Not on file   Social History Narrative    Not on file     Social Determinants of Health     Financial Resource Strain:     Difficulty of Paying Living Expenses: Not on file   Food Insecurity:     Worried About Running Out of Food in the Last Year: Not on file    Ashley of Food in the Last Year: Not on file   Transportation Needs:     Lack of Transportation (Medical): Not on file    Lack of Transportation (Non-Medical):  Not on file   Physical Activity:     Days of Exercise per Week: Not on file    Minutes of Exercise per Session: Not on file   Stress:     Feeling of Stress : Not on file   Social Connections:     Frequency of Communication with Friends and Family: Not on file    Frequency of Social Gatherings with Friends and Family: Not on file    Attends Jehovah's witness Services: Not on file    Active Member of Clubs or Organizations: Not on file    Attends Club or Organization Meetings: Not on file    Marital Status: Not on file   Intimate Partner Violence:     Fear of Current or Ex-Partner: Not on file    Emotionally Abused: Not on file    Physically Abused: Not on file    Sexually Abused: Not on file   Housing Stability:     Unable to Pay for Housing in the Last Year: Not on file    Number of Places Lived in the Last Year: Not on file    Unstable Housing in the Last Year: Not on file       Family History:   History reviewed. No pertinent family history. Medical Decision Making:   I have independently reviewed/ordered the following labs:    CBC with Differential:   Recent Labs     05/29/22 1933   WBC 6.7   HGB 8.9*   HCT 27.5*   *   LYMPHOPCT 16*   MONOPCT 8     BMP:  Recent Labs     05/29/22 1933      K 3.9      CO2 25   BUN 14   CREATININE 0.78   MG 1.9     Hepatic Function Panel: No results for input(s): PROT, LABALBU, BILIDIR, IBILI, BILITOT, ALKPHOS, ALT, AST in the last 72 hours. No results for input(s): RPR in the last 72 hours. No results for input(s): HIV in the last 72 hours. No results for input(s): BC in the last 72 hours. Lab Results   Component Value Date    CREATININE 0.78 05/29/2022    GLUCOSE 73 05/29/2022       Detailed results: Thank you for allowing us to participate in the care of this patient. Please call with questions. This note is created with the assistance of a speech recognition program.  While intending to generate adocument that actually reflects the content of the visit, the document can still have some errors including those of syntax and sound a like substitutions which may escape proof reading. It such instances, actual meaningcan be extrapolated by contextual diversion.     Claudia Titus MD  Office: [phone]  Perfect serve / office [phone]

## 2022-05-30 NOTE — CONSULTS
General Surgery  Consult    PATIENT NAME: Merlin Brittle  AGE: 77 y.o. MEDICAL RECORD NO. [id number]  DATE: 5/30/2022  SURGEON: Dr. Nora Garcia: No primary care provider on file. Patient information was obtained from past medical records, family members  History/Exam limitations: mental status. Patient presented to the Emergency Department by ambulance where the patient received see Ambulance Run Sheet prior to arrival.    IMPRESSION:     Patient Active Problem List   Diagnosis    Weakness    [facility]'s [facility] [address]    COVID-19    Cervical myelopathy ([facility] [address]    Hypertension    Iron deficiency anemia    Moderate protein-calorie malnutrition ([facility] [address]    Community acquired bilateral lower lobe pneumonia    Encounter for palliative care    Dehydration       77year old male with need for enteral feeding access      PLAN:   Will schedule PEG placement tomorrow  Will call brother to discuss risks and benefits of the procedure, answer any of his questions and obtain consent. Keep pt NPO    HISTORY:   History of Chief Complaint:    Merlin Brittle is a 77 y.o. male who presented to the emergency department yesterday for NG tube placement. Patient has Parkinson's disease and failed a barium swallow study on 5/24 while in the hospital for covid. He had been given an NG tube on discharge for feeds but patient subsequently pulled it out once he was back at his facility. The facility was unable to replace the NG tube and so they sent him to the emergency department for enteral feeding access. He tested positive for COVID during his last admission, however the Infectious Disease note from this morning states patient was out of isolation on 5/27 and he is cleared for procedures. Patient is limited historian regarding medical and surgical history but he denies any abdominal surgeries and does not have any scars on exam.     Past Medical History   has no past medical history on file.   Past Surgical History   has no past surgical history on file. Medications  Prior to Admission medications    Medication Sig Start Date End Date Taking? Authorizing Provider   amLODIPine (NORVASC) 10 MG tablet 1 tablet by Per NG tube route daily 5/27/22 6/26/22  Karyn Williamson MD   carbidopa-levodopa (SINEMET)  MG per tablet 1 tablet by Per NG tube route 3 times daily 5/26/22 7/25/22  Karyn Williamson MD   sennosides-docusate sodium (SENOKOT-S) 8.6-50 MG tablet 2 tablets by Per NG tube route daily for 15 days 5/26/22 6/10/22  Karyn Williamson MD   Nutritional Supplements (BOOST 100 CALORIE SMART) LIQD 1 Bottle by Nasogastric route in the morning and at bedtime 5/26/22 8/24/22  Karyn Williamson MD   ferrous sulfate (FE TABS) 325 (65 Fe) MG EC tablet Take 1 tablet by mouth daily (with breakfast) 5/26/22 8/24/22  Karyn Williamson MD   amoxicillin-clavulanate (AUGMENTIN) 875-125 MG per tablet 1 tablet by Per NG tube route 2 times daily for 5 days 5/26/22 5/31/22  Karyn Williamson MD   Ergocalciferol (VITAMIN D) 52843 units CAPS Take 50,000 Units by mouth once a week for 7 doses 5/25/22 7/7/22  Sabine Danielle MD    Scheduled Meds:   sodium chloride flush  5-40 mL IntraVENous 2 times per day    enoxaparin  40 mg SubCUTAneous Daily    amLODIPine  10 mg Per NG tube Daily    carbidopa-levodopa  1 tablet Per NG tube TID     Continuous Infusions:   dextrose 5 % and 0.9 % NaCl 75 mL/hr at 05/30/22 0849    sodium chloride       PRN Meds:.sodium chloride flush, sodium chloride, ondansetron **OR** ondansetron  Allergies  is allergic to penicillin g. Family History  family history is not on file. Social History   reports that he has quit smoking. He has never used smokeless tobacco.   reports previous alcohol use. reports previous drug use. Review of Systems  Unable to obtain due to mental condition    PHYSICAL:   VITALS:  height is 5' 9.02\" (1.753 m) and weight is 137 lb (62.1 kg). His oral temperature is 98.2 °F (36.8 °C).  His blood pressure is 119/64 and his pulse is 77. His respiration is 18 and oxygen saturation is 95%. CONSTITUTIONAL: Alert and oriented times 3, no acute distress and cooperative to examination. HEENT: Head is normocephalic, atraumatic. EOMI, PERRLA  NECK: Soft, trachea midline and straight  LUNGS: Chest expands equally bilaterally upon respiration, no accessory muscle used. Ausculation reveals no wheezes, rales or rhonchi. CARDIOVASCULAR: Heart sounds are normal.  Regular rate and rhythm without murmur, gallop or rub. ABDOMEN: soft, nontender, nondistended, no masses or organomegaly, no abdominal scars, 2 cm umbilical hernia easily reducible  NEUROLOGIC: CN II-XII are grossly intact. There are no focalizing motor or sensory deficits  EXTREMITIES: no cyanosis, clubbing or edema    LABS:     Recent Labs     05/29/22 1933   WBC 6.7   HGB 8.9*   HCT 27.5*   *      K 3.9      CO2 25   BUN 14   CREATININE 0.78   MG 1.9   CALCIUM 7.8*     No results for input(s): ALKPHOS, ALT, AST, BILITOT, BILIDIR, LABALBU, AMYLASE, LIPASE in the last 72 hours.   CBC with Differential:    Lab Results   Component Value Date    WBC 6.7 05/29/2022    RBC 2.94 05/29/2022    HGB 8.9 05/29/2022    HCT 27.5 05/29/2022     05/29/2022    MCV 93.5 05/29/2022    MCH 30.3 05/29/2022    MCHC 32.4 05/29/2022    RDW 14.7 05/29/2022    LYMPHOPCT 16 05/29/2022    MONOPCT 8 05/29/2022    BASOPCT 1 05/29/2022    MONOSABS 0.54 05/29/2022    LYMPHSABS 1.10 05/29/2022    EOSABS 0.17 05/29/2022    BASOSABS 0.04 05/29/2022     BMP:    Lab Results   Component Value Date     05/29/2022    K 3.9 05/29/2022     05/29/2022    CO2 25 05/29/2022    BUN 14 05/29/2022    LABALBU 2.4 05/20/2022    CREATININE 0.78 05/29/2022    CALCIUM 7.8 05/29/2022    GFRAA >60 05/29/2022    LABGLOM >60 05/29/2022    GLUCOSE 73 05/29/2022       Julia Pack DO  5/30/2022, 3:22 PM

## 2022-05-30 NOTE — H&P
[facility]     Department of Internal Medicine - Staff Internal Medicine Teaching Service          ADMISSION NOTE/HISTORY AND PHYSICAL EXAMINATION   Date: 5/29/2022  Patient Name: Myla Connor  Date of admission: 5/29/2022  1:14 PM  YOB: 1945  PCP: No primary care provider on file. History Obtained From:  electronic medical record    CHIEF COMPLAINT     Chief complaint:     HISTORY OF PRESENTING ILLNESS     The patient is a pleasant 77 y.o. male was brought by the EMS from [address] [place] [address] for NG placement. Patient is a very poor historian. Has Parkinson's disease. Could not obtain any history from the patient. Was recently admitted to the hospital on 15th May and discharged on 27th May, presented during that time with syncopal, found to be orthostatics positive. Discharged with Holter. There was a concern for aspiration due to laryngeal muscle weakness, failed swallow studies, due to Parkinson disease versus acute CVA, was treated with Augmentin for 5 days, was discharged with NG tube. Was COVID-positive during that admission. Got remdesivir. Today patient actually pulled NG tube out, and there was hard time getting it in place. Attempts were made to place NG tube, but did not pass in the GE junction as per x-rays is coiling In the esophagus. Multiple attempts were made after withdrawing and then really advancing meeting resistance and not able to withdraw any stomach contents, eventually remove the NG. Patient is on carbidopa levodopa for parkinsonism, on Norvasc 10 mg for essential hypertension, on ferrous sulfate for iron deficiency anemia    Last Echo - May 2022 shows LVEF of greater than 55%    ED Course:   On arrival to the ED patient was afebrile  Heart rate in 70 s  Hypertensive  Hypoxic, placed on nasal cannula    Initial labs Showed -   Sodium 138  Potassium 3.8  Cr 0.78  Calcium 7.8  Wbc 6.7  Hb 8.9    COVID positive  CXR showed stable B/L opacities    Vitals:    22 2145   BP: (!) 151/62   Pulse: 76   Resp: 18   Temp: 98.2 °F (36.8 °C)   SpO2: 96%       Review of Systems: could not be obtained  Review of Systems      PAST MEDICAL HISTORY     History reviewed. No pertinent past medical history. PAST SURGICAL HISTORY     History reviewed. No pertinent surgical history. ALLERGIES     Penicillin g    MEDICATIONS PRIOR TO ADMISSION     Prior to Admission medications    Medication Sig Start Date End Date Taking? Authorizing Provider   amLODIPine (NORVASC) 10 MG tablet 1 tablet by Per NG tube route daily 22  John Galicia MD   carbidopa-levodopa (SINEMET)  MG per tablet 1 tablet by Per NG tube route 3 times daily 22  John Galicia MD   sennosides-docusate sodium (SENOKOT-S) 8.6-50 MG tablet 2 tablets by Per NG tube route daily for 15 days 5/26/22 6/10/22  John Galicia MD   Nutritional Supplements (BOOST 100 CALORIE SMART) LIQD 1 Bottle by Nasogastric route in the morning and at bedtime 22  John Galciia MD   ferrous sulfate (FE TABS) 325 (65 Fe) MG EC tablet Take 1 tablet by mouth daily (with breakfast) 22  John Galicia MD   amoxicillin-clavulanate (AUGMENTIN) 875-125 MG per tablet 1 tablet by Per NG tube route 2 times daily for 5 days 22  John Galicia MD   Ergocalciferol (VITAMIN D) 57833 units CAPS Take 50,000 Units by mouth once a week for 7 doses 22  Kristyn Mendes MD       SOCIAL HISTORY     According to the chart review  Tobacco: former smoker  Alcohol: not currently  Illicits: not currently    FAMILY HISTORY     History reviewed. No pertinent family history.     PHYSICAL EXAM     Vitals: BP (!) 151/62   Pulse 76   Temp 98.2 °F (36.8 °C) (Oral)   Resp 18   Wt 137 lb (62.1 kg)   SpO2 96%   BMI 20.23 kg/m²   Tmax: Temp (24hrs), Av.3 °F (36.8 °C), Min:98.2 °F (36.8 °C), Max:98.4 °F (36.9 °C)    Last Body weight:   Wt Readings from Last 3 Encounters:   05/29/22 137 lb (62.1 kg)   05/26/22 137 lb (62.1 kg)   05/16/22 113 lb (51.3 kg)     Body Mass Index : Body mass index is 20.23 kg/m². PHYSICAL EXAMINATION:  Physical Exam  Constitutional:       Appearance: He is ill-appearing. HENT:      Mouth/Throat:      Mouth: Mucous membranes are dry. Cardiovascular:      Rate and Rhythm: Normal rate and regular rhythm. Pulses: Normal pulses. Heart sounds: Normal heart sounds. Pulmonary:      Effort: Respiratory distress present. Breath sounds: Rhonchi present. No rales. Abdominal:      General: There is no distension. Tenderness: There is no abdominal tenderness. Musculoskeletal:         General: No swelling. Right lower leg: No edema. Left lower leg: No edema. Skin:     Coloration: Skin is not jaundiced. Findings: No bruising or rash. Neurological:      Mental Status: He is alert. Mental status is at baseline.       Comments: Tremor present           INVESTIGATIONS     Laboratory Testing:     Recent Results (from the past 24 hour(s))   CBC with Auto Differential    Collection Time: 05/29/22  7:33 PM   Result Value Ref Range    WBC 6.7 3.5 - 11.3 k/uL    RBC 2.94 (L) 4.21 - 5.77 m/uL    Hemoglobin 8.9 (L) 13.0 - 17.0 g/dL    Hematocrit 27.5 (L) 40.7 - 50.3 %    MCV 93.5 82.6 - 102.9 fL    MCH 30.3 25.2 - 33.5 pg    MCHC 32.4 28.4 - 34.8 g/dL    RDW 14.7 (H) 11.8 - 14.4 %    Platelets 352 (L) 908 - 453 k/uL    MPV 10.7 8.1 - 13.5 fL    NRBC Automated 0.0 0.0 per 100 WBC    Seg Neutrophils 71 (H) 36 - 65 %    Lymphocytes 16 (L) 24 - 43 %    Monocytes 8 3 - 12 %    Eosinophils % 3 1 - 4 %    Basophils 1 0 - 2 %    Immature Granulocytes 1 (H) 0 %    Segs Absolute 4.79 1.50 - 8.10 k/uL    Absolute Lymph # 1.10 1.10 - 3.70 k/uL    Absolute Mono # 0.54 0.10 - 1.20 k/uL    Absolute Eos # 0.17 0.00 - 0.44 k/uL    Basophils Absolute 0.04 0.00 - 0.20 k/uL    Absolute Immature Granulocyte 0.06 0.00 - 0.30 k/uL RBC Morphology ANISOCYTOSIS PRESENT    Basic Metabolic Panel w/ Reflex to MG    Collection Time: 05/29/22  7:33 PM   Result Value Ref Range    Glucose 73 70 - 99 mg/dL    BUN 14 8 - 23 mg/dL    CREATININE 0.78 0.70 - 1.20 mg/dL    Calcium 7.8 (L) 8.6 - 10.4 mg/dL    Sodium 138 135 - 144 mmol/L    Potassium 3.9 3.7 - 5.3 mmol/L    Chloride 104 98 - 107 mmol/L    CO2 25 20 - 31 mmol/L    Anion Gap 9 9 - 17 mmol/L    GFR Non-African American >60 >60 mL/min    GFR African American >60 >60 mL/min    GFR Comment         Magnesium    Collection Time: 05/29/22  7:33 PM   Result Value Ref Range    Magnesium 1.9 1.6 - 2.6 mg/dL   COVID-19, Rapid    Collection Time: 05/29/22  8:21 PM    Specimen: Nasopharyngeal Swab   Result Value Ref Range    Specimen Description . NASOPHARYNGEAL SWAB     SARS-CoV-2, Rapid DETECTED (A) Not Detected       Imaging:   XR CHEST PORTABLE    Result Date: 5/29/2022  NG tube tip is not well seen but appears to be in the region of the GE junction. Consider advancing NG tube by 5 cm Stable bibasal opacities and small bilateral pleural effusions. .     XR CHEST PORTABLE    Result Date: 5/24/2022  Stable bibasilar parenchymal opacities and bilateral pleural effusions. XR ABDOMEN FOR NG/OG/NE TUBE PLACEMENT    Result Date: 5/29/2022  Nasogastric tube tip along the distal esophagus which is not significantly changed in position. Recommend readjusting the tube into the distal stomach Dilated loops of bowel along the upper abdomen which is less prominent Hazy bibasilar opacities which is less prominent. XR ABDOMEN FOR NG/OG/NE TUBE PLACEMENT    Result Date: 5/29/2022  ET tube tip remains at the EG junction. Recommend readjusting the tip into the distal stomach as described previously. Mildly dilated loops of bowel throughout the upper abdomen. XR ABDOMEN FOR NG/OG/NE TUBE PLACEMENT    Result Date: 5/29/2022  Enteric tube as described above.   Recommend advancing 5 cm     XR ABDOMEN FOR NG/OG/NE TUBE PLACEMENT    Result Date: 5/26/2022  NG tube position, as detailed     XR ABDOMEN FOR NG/OG/NE TUBE PLACEMENT    Result Date: 5/26/2022  Suboptimally positioned nasogastric tube with tip projected at the distal esophagus. RECOMMENDATION: Advance the nasogastric tube at least 7-8 cm, then reimage to reassess tube position     XR ABDOMEN FOR NG/OG/NE TUBE PLACEMENT    Result Date: 5/26/2022  Intestinal tube is looped within a hiatal hernia above the diaphragm. Mild gaseous distension of the intestinal tract. The findings were sent to the Radiology Results [address] at 9:45 am on 5/26/2022 to be communicated to a licensed caregiver. RECOMMENDATION: Reposition intestinal tube. FL MODIFIED BARIUM SWALLOW W VIDEO    Result Date: 5/24/2022  1. Penetration followed by aspiration with the nectar thick substance. 2. No pureed/pudding thick substance was swallowed into it was mixed with the nectar thick substance. 3. Remainder of the substances not attempted. Please see separate speech pathology report for full discussion of findings and recommendations.  RECOMMENDATIONS: Unavailable       ASSESSMENT & PLAN     ASSESSMENT:     Primary Problem  Dehydration    Active Hospital Problems    Diagnosis Date Noted    Dehydration [E86.0] 05/29/2022     Priority: Medium    Moderate protein-calorie malnutrition ([facility] [place] [address] [E44.0] 05/23/2022     Priority: Medium    Iron deficiency anemia [D50.9] 05/18/2022     Priority: Medium    COVID-19 [U07.1] 05/17/2022     Priority: Medium    Cervical myelopathy ([address] [G95.9] 05/17/2022     Priority: Medium    Hypertension [I10] 05/17/2022     Priority: Medium    Parkinson's disease ([address] [G20]      Priority: Medium       PLAN:     IMPRESSION  This is a 77 y.o. male who presented with above mentioned complaints and was admitted to inpatient service for further management as follows:     Principal Problem:    Dehydration  Active Problems:    Parkinson's disease ([address] COVID-19    Cervical myelopathy (H[facility])    Hypertension    Iron deficiency anemia    Moderate protein-calorie malnutrition ([address]  Resolved Problems:    * No resolved hospital problems. *      1. Dysphagia -   - NG pulled out   - multiple failed attempts  - IR guided NG placement tomorrow    2. Acute Hypoxic respiratory failure -   - likely due to COVID 19 infection  - continue supplemental oxygen   - Wean off as tolerated    3. parkinson's disease -   - on carbidopa-levodopa TID per NG  - held for now  - will resume     4. Essential hypertension -   - Norvasc 10 mg daily    5. Iron deficiency anemia -   - continue oral iron supplements after NG is put in    6. Cervical myelopathy    7. COVID 19 infection -  - s/p remdesivir    8.  Alcohol abuse -   - will start thiamine, folate and multivitamin    Diet:  NPO  DVT ppx: lovenox  GI ppx: not indicated    PT/OT/SW - consulted  Discharge Planning - consulted       Rica Salgado  PGY-1  Internal Medicine  [facility]   11:13 [place] 5/29/2022

## 2022-05-31 ENCOUNTER — ANESTHESIA (OUTPATIENT)
Dept: OPERATING ROOM | Age: 77
DRG: 391 | End: 2022-05-31
Payer: OTHER GOVERNMENT

## 2022-05-31 ENCOUNTER — ANESTHESIA EVENT (OUTPATIENT)
Dept: OPERATING ROOM | Age: 77
DRG: 391 | End: 2022-05-31
Payer: OTHER GOVERNMENT

## 2022-05-31 LAB
ABO/RH: NORMAL
ACETYLCHOL BLOCK AB: 6 % (ref 0–26)
ACETYLCHOLINE BINDING ANTIBODY: 0 NMOL/L (ref 0–0.4)
ANTIBODY SCREEN: NEGATIVE
ARM BAND NUMBER: NORMAL
EXPIRATION DATE: NORMAL
GLUCOSE BLD-MCNC: 76 MG/DL (ref 75–110)
GLUCOSE BLD-MCNC: 98 MG/DL (ref 75–110)
STRIATED MUSCLE AB, IGG SCREEN: NORMAL
TITIN ANTIBODY: 0.23 IV (ref 0–0.45)

## 2022-05-31 PROCEDURE — 86901 BLOOD TYPING SEROLOGIC RH(D): CPT

## 2022-05-31 PROCEDURE — 3700000000 HC ANESTHESIA ATTENDED CARE: Performed by: SURGERY

## 2022-05-31 PROCEDURE — 99232 SBSQ HOSP IP/OBS MODERATE 35: CPT | Performed by: INTERNAL MEDICINE

## 2022-05-31 PROCEDURE — 1200000000 HC SEMI PRIVATE

## 2022-05-31 PROCEDURE — 7100000001 HC PACU RECOVERY - ADDTL 15 MIN: Performed by: SURGERY

## 2022-05-31 PROCEDURE — 3700000001 HC ADD 15 MINUTES (ANESTHESIA): Performed by: SURGERY

## 2022-05-31 PROCEDURE — 97162 PT EVAL MOD COMPLEX 30 MIN: CPT

## 2022-05-31 PROCEDURE — 82947 ASSAY GLUCOSE BLOOD QUANT: CPT

## 2022-05-31 PROCEDURE — 86850 RBC ANTIBODY SCREEN: CPT

## 2022-05-31 PROCEDURE — 2580000003 HC RX 258: Performed by: STUDENT IN AN ORGANIZED HEALTH CARE EDUCATION/TRAINING PROGRAM

## 2022-05-31 PROCEDURE — 2709999900 HC NON-CHARGEABLE SUPPLY: Performed by: SURGERY

## 2022-05-31 PROCEDURE — 3E0G76Z INTRODUCTION OF NUTRITIONAL SUBSTANCE INTO UPPER GI, VIA NATURAL OR ARTIFICIAL OPENING: ICD-10-PCS | Performed by: SURGERY

## 2022-05-31 PROCEDURE — 3609017100 HC EGD: Performed by: SURGERY

## 2022-05-31 PROCEDURE — 2580000003 HC RX 258: Performed by: HEALTH CARE PROVIDER

## 2022-05-31 PROCEDURE — 36415 COLL VENOUS BLD VENIPUNCTURE: CPT

## 2022-05-31 PROCEDURE — 7100000000 HC PACU RECOVERY - FIRST 15 MIN: Performed by: SURGERY

## 2022-05-31 PROCEDURE — 0DH63UZ INSERTION OF FEEDING DEVICE INTO STOMACH, PERCUTANEOUS APPROACH: ICD-10-PCS | Performed by: SURGERY

## 2022-05-31 PROCEDURE — 97530 THERAPEUTIC ACTIVITIES: CPT

## 2022-05-31 PROCEDURE — 86900 BLOOD TYPING SEROLOGIC ABO: CPT

## 2022-05-31 PROCEDURE — 6360000002 HC RX W HCPCS

## 2022-05-31 PROCEDURE — 2500000003 HC RX 250 WO HCPCS

## 2022-05-31 PROCEDURE — 0DJ08ZZ INSPECTION OF UPPER INTESTINAL TRACT, VIA NATURAL OR ARTIFICIAL OPENING ENDOSCOPIC: ICD-10-PCS | Performed by: SURGERY

## 2022-05-31 PROCEDURE — 97535 SELF CARE MNGMENT TRAINING: CPT

## 2022-05-31 PROCEDURE — 97166 OT EVAL MOD COMPLEX 45 MIN: CPT

## 2022-05-31 RX ORDER — MIDAZOLAM HYDROCHLORIDE 1 MG/ML
INJECTION INTRAMUSCULAR; INTRAVENOUS PRN
Status: DISCONTINUED | OUTPATIENT
Start: 2022-05-31 | End: 2022-05-31 | Stop reason: SDUPTHER

## 2022-05-31 RX ORDER — LIDOCAINE HYDROCHLORIDE 10 MG/ML
INJECTION, SOLUTION EPIDURAL; INFILTRATION; INTRACAUDAL; PERINEURAL PRN
Status: DISCONTINUED | OUTPATIENT
Start: 2022-05-31 | End: 2022-05-31 | Stop reason: SDUPTHER

## 2022-05-31 RX ORDER — PROPOFOL 10 MG/ML
INJECTION, EMULSION INTRAVENOUS PRN
Status: DISCONTINUED | OUTPATIENT
Start: 2022-05-31 | End: 2022-05-31 | Stop reason: SDUPTHER

## 2022-05-31 RX ADMIN — PROPOFOL 20 MG: 10 INJECTION, EMULSION INTRAVENOUS at 16:52

## 2022-05-31 RX ADMIN — DEXTROSE AND SODIUM CHLORIDE: 5; 900 INJECTION, SOLUTION INTRAVENOUS at 18:16

## 2022-05-31 RX ADMIN — MIDAZOLAM HYDROCHLORIDE 1 MG: 1 INJECTION, SOLUTION INTRAMUSCULAR; INTRAVENOUS at 16:47

## 2022-05-31 RX ADMIN — DEXTROSE AND SODIUM CHLORIDE: 5; 900 INJECTION, SOLUTION INTRAVENOUS at 09:10

## 2022-05-31 RX ADMIN — SODIUM CHLORIDE, PRESERVATIVE FREE 10 ML: 5 INJECTION INTRAVENOUS at 09:01

## 2022-05-31 RX ADMIN — PROPOFOL 20 MG: 10 INJECTION, EMULSION INTRAVENOUS at 16:47

## 2022-05-31 RX ADMIN — SODIUM CHLORIDE: 9 INJECTION, SOLUTION INTRAVENOUS at 16:40

## 2022-05-31 RX ADMIN — LIDOCAINE HYDROCHLORIDE 50 MG: 10 INJECTION, SOLUTION EPIDURAL; INFILTRATION; INTRACAUDAL; PERINEURAL at 16:47

## 2022-05-31 ASSESSMENT — PAIN SCALES - GENERAL
PAINLEVEL_OUTOF10: 0
PAINLEVEL_OUTOF10: 5
PAINLEVEL_OUTOF10: 5
PAINLEVEL_OUTOF10: 0

## 2022-05-31 ASSESSMENT — ENCOUNTER SYMPTOMS
EYE REDNESS: 0
ABDOMINAL DISTENTION: 0
COLOR CHANGE: 0
APNEA: 0
EYE DISCHARGE: 0

## 2022-05-31 ASSESSMENT — PAIN - FUNCTIONAL ASSESSMENT: PAIN_FUNCTIONAL_ASSESSMENT: 0-10

## 2022-05-31 ASSESSMENT — LIFESTYLE VARIABLES: SMOKING_STATUS: 0

## 2022-05-31 NOTE — OP NOTE
Operative Note      Patient: Merlin Brittle  YOB: 1945  MRN: [id number]    Date of Procedure: 5/31/2022    Pre-Op Diagnosis: DYSPHAGIA    Post-Op Diagnosis: Same       Procedure(s):  EGD ESOPHAGOGASTRODUODENOSCOPY, PEG    Surgeon(s):  Elvis Restrepo MD    Assistant:   Resident: Wagner Vilchis DO, Cleve Darner, DO    Anesthesia: General    Estimated Blood Loss (mL): 1    Complications: None    Specimens:   * No specimens in log *    Implants:  * No implants in log *      Drains:   Gastrostomy/Enterostomy/Jejunostomy Tube Percutaneous Endoscopic Gastrostomy (PEG) LUQ 1 20 fr (Active)       External Urinary Catheter (Active)   Site Assessment Clean; Intact;Dry 05/31/22 1200   Placement Repositioned 05/31/22 1200   Perineal Care Yes 05/31/22 0906   Suction 40 mmgHg continuous 05/31/22 1200   Urine Color Yellow 05/31/22 1200   Urine Appearance Clear 05/31/22 1200   Output (mL) 500 mL 05/30/22 2235       [REMOVED] NG/OG/NJ/NE Tube Nasogastric 16 fr Left nostril (Removed)   Surrounding Skin Clean, dry & intact 05/27/22 0830   Securement device Tape 05/27/22 0830   Status Continuous feeding 05/27/22 0830   Placement Verified Gastric Contents 05/27/22 0830   NG/OG/NJ/NE External Measurement (cm) 78 cm 05/27/22 0830   Drainage Appearance None 05/27/22 0830   Tube Feeding Other Tube Feeding (must specify product in comment) 05/27/22 0830   Tube feeding/verify rate (mL/hr) 30 mL/hr 05/27/22 0830   Tube Feeding Intake (mL) 155 ml 05/27/22 0521   Free Water/Flush (mL) 120 mL 05/27/22 0830   Residual Volume (ml) 0 ml 05/26/22 1700       [REMOVED] NG/OG/NJ/NE Tube Nasogastric 16 fr Left nostril (Removed)   Surrounding Skin Dry 05/29/22 1422   Securement device Bridle 05/29/22 1422   Status Clamped 05/29/22 1422   Placement Verified X-Ray (Initial) 05/29/22 1422       [REMOVED] External Urinary Catheter (Removed)   Site Assessment Clean,dry & intact 05/21/22 1200   Urine Color Yellow 05/21/22 1200   Urine Appearance Clear 05/21/22 1200   Output (mL) 600 mL 05/21/22 1400       [REMOVED] External Urinary Catheter (Removed)   Site Assessment Clean; Intact 05/27/22 0830   Placement Repositioned 05/27/22 0830   Securement Method Tape 05/27/22 0830   Catheter Care Suction Canister/Tubing changed 05/26/22 2031   Perineal Care Yes 05/26/22 2031   Suction 40 mmgHg continuous 05/27/22 0830   Urine Color Yellow 05/27/22 0830   Urine Appearance Clear 05/27/22 0830   Output (mL) 600 mL 05/27/22 0830       Findings: small hiatal hernia, 20Fr PEG secured 2cm at skin    HISTORY: The patient is a 77y.o. year old male with history of above preop diagnosis. I recommended esophagogastroduodenoscopy with placement of G tube with possible biopsy. I explained the risk, benefits, expected outcome, and alternatives to the procedure. Risks included but are not limited to bleeding, infection, respiratory distress, hypotension, and perforation of the esophagus, stomach, or duodenum. Family and patient understand and is in agreement. PROCEDURE: The pt was brought to the OR. A time out was made to verify correct pt and procedure type. The patient was given sedation per anesthesia. The patient's SPO2 remained above 90% throughout the procedure. A mouthpiece was placed in pt's mouth. The endoscope was inserted orally and advanced under direct vision through the esophagus, through the stomach, through the pylorus, and into the descending duodenum. The stomach was then insufflated with air and positioned in the midportion and directed towards the anterior abdominal wall. With the room darkened and intensity turned up on the endoscope, a good light reflex was noted on the skin of the abdominal wall in the left upper quadrant. Finger pressure was applied at the light reflex with adequate indentation on the stomach wall on endoscopy.   A polypectomy snare was passed into the stomach, opened fully, and positioned to loop encircle the point of demonstrated finger indentation. The overlying skin was anesthetized with lidocaine and a 1.0 cm incision was made at the chosen site. The introducer needle with overlying catheter was passed through this incision and needle and catheter were gently captured by the endoscopic snare. The guide wire was passed and snared. The endoscope, snare, and guide wire were then withdrawn and pulled back out of the mouth. The gastrostomy tube was attached to the loop of the guide wire and the whole thing pulled back into the stomach until the 2cm kelsie of the gastrostomy tube was noted at skin level. The gastrostomy tube end was cut and the cramping appendage was placed. The tube was then taped to skin. Abdominal binder was ordered. Patient tolerated the procedure well. Patient was then transferred to PACU in stable condition. Dr. Tigre Chinchilla as present for the entire procedure. Electronically signed by Caden Avelar DO on 5/31/2022 at 5:09 PM  Present throughout case.

## 2022-05-31 NOTE — PROGRESS NOTES
Occupational Therapy  Facility/Department: [facility]  Occupational Therapy Initial Assessment    Name: Michael Pérez  : 1945  MRN: [id number]  Date of Service: 2022     Chief Complaint   Patient presents with    Feeding Tube Problem     NG removed by patient accidentally at [facility]       Discharge Recommendations:  Patient would benefit from continued therapy after discharge  OT Equipment Recommendations  Equipment Needed: Yes  Mobility Devices: ADL Assistive Devices  ADL Assistive Devices: Toileting - 3-in-1 Commode;Reacher;Sock-Aid Hard;Sock-Aid Soft;Feeding Devices       Patient Diagnosis(es): The primary encounter diagnosis was Encounter for nasogastric tube placement. A diagnosis of Aspiration pneumonia of both lungs, unspecified aspiration pneumonia type, unspecified part of lung ([address] was also pertinent to this visit. Past Medical History:  has no past medical history on file. Past Surgical History:  has no past surgical history on file. Assessment   Performance deficits / Impairments: Decreased functional mobility ; Decreased ADL status; Decreased ROM; Decreased strength;Decreased endurance;Decreased cognition;Decreased safe awareness;Decreased balance;Decreased high-level IADLs;Decreased fine motor control;Decreased coordination;Decreased posture  Assessment: Pt demonstrated bed mobility sit<>supine with Mod A, functional sit<>stand transfers with CGA and functional mobility with CGA. Use of RW. Pt required verbal cues for initiation/sequencing with good return and fair/poor carryover d/t cognition. Pt engaged in ADLs EOB. Required Mod A for UB dressing, Min A for UB bathing, Mod A for LB bathing and Max A for LB dressing. Pt washed face with SBA for increased time and cues. Pt significantly limited by tremors.  Pt is expected to require skilled OT services during their acute hospitalization stay to address the above noted deficits through skilled occupational therapy intervention for promotion of increased independence throughout ADLs, IADLs and functional mobility tasks. Pt is currently unsafe to return to their prior living arrangements without 24/7 assist/supervision to safely engage in all aspects of ADLs, IADLs and functional mobility tasks. Prognosis: Good  Decision Making: Medium Complexity  REQUIRES OT FOLLOW-UP: Yes  Activity Tolerance  Activity Tolerance: Patient limited by fatigue  Activity Tolerance Comments: Limited by BUEs tremors        Plan   Plan  Times per Week: 3-4x/wk  Current Treatment Recommendations: Balance training,Functional mobility training,Endurance training,Patient/Caregiver education & training,Equipment evaluation, education, & procurement,Safety education & training,Self-Care / ADL,Home management training,Cognitive/Perceptual training,ROM,Strengthening     Restrictions  Restrictions/Precautions  Restrictions/Precautions: NPO,Fall Risk  Required Braces or Orthoses?: No  Position Activity Restriction  Other position/activity restrictions: up with assistance; Hx of Parkinsons    Subjective   General  Patient assessed for rehabilitation services?: Yes  Family / Caregiver Present: No  General Comment  Comments: RN ok'd for OT/PT eval this AM. Pt agreeable to session, pleasent/cooperative throughout. Pt denies pain. Social/Functional History  Social/Functional History  Lives With:  (Arrived from facility)  Type of Home:  (unable to formally assess; pt is a questionable historian.  Pt reports coming from SNF prior to admission but was only there for 1 day.)  Home Equipment: Rollator  ADL Assistance: Needs assistance (Pt requires requires assistance for all ADLs, including feeding and grooming d/t tremors and decreased strength.)  Toileting: Needs assistance  Homemaking Assistance: Needs assistance  Ambulation Assistance: Needs assistance       Objective       Safety Devices  Type of Devices: Left in bed;Gait belt;Patient at risk for falls; Bed alarm in place;Call light within reach;Nurse notified  Restraints  Restraints Initially in Place: No    Balance  Sitting:  (CGA) Seated EOB unsupported for dynamic/static seated tasks for ~35 minutes. Standing:  (CGA) Standing EOB for static and dynamic standing marches with RW 90 seconds    Gait  Overall Level of Assistance: Contact-guard assistance: Completed functional side steps towards HOB with RW and VCs. Pt requested to complete mobility again and completed a few feet forward and back with CGA. Pt fatigued quickly demonstrating decreased endurance. AROM: Generally decreased, functional  PROM: Generally decreased, functional  Strength: Grossly decreased, non-functional  Coordination: Grossly decreased, non-functional (Decreased FMC d/t significant tremors from Parkinson's)  Tone: Normal  Sensation: Intact (Denies any numbness/tingling.)     ADL  Feeding: Minimal assistance;Setup; Increased time to complete  Grooming: Verbal cueing; Increased time to complete;Setup;Stand by assistance  Grooming Skilled Clinical Factors: Required verbal cues and encouragement to initiate washing face. Pt requesting writer to complete for him, educated on importance of him engaging and completing ADLs IND. Pt required increased time d/t BUE tremors. UE Bathing: Minimal assistance;Setup;Verbal cueing; Increased time to complete  UE Bathing Skilled Clinical Factors: Pt sat EOB with CGA nad washed BUEs, abodmen and chest. Required significant time and min A for thorougness d/t tremors and decreased endurance. LE Bathing: Setup;Verbal cueing; Moderate assistance  LE Bathing Skilled Clinical Factors: Sitting EOB pt washed BLEs by reaching forward into flexion, assist for thoroughness. Required assist to wash B feet d/t decreased functional reach, BUE tremors and decreased endurance. UE Dressing: Moderate assistance; Increased time to complete;Setup;Verbal cueing  UE Dressing Skilled Clinical Factors: Min A to don gown to untie and thread around lines. Pt able to don B sleeves of gown to elbow, but assist to complete task d/t decreased shoudler ROM, tremors and decreased strength/endurance. LE Dressing: Increased time to complete;Verbal cueing;Setup;Maximum assistance  LE Dressing Skilled Clinical Factors: Require Max A to don B socks seated EOB. Pt required assist to hold figure four position, but d/t tremors and endurance pt unable to thread over toes/ankles. Assist requied and pt then able to reach forward into flexion to adjust  Toileting: Moderate assistance;Setup; Increased time to complete     Activity Tolerance  Activity Tolerance: Patient limited by fatigue;Patient limited by endurance     Bed mobility  Supine to Sit: Moderate assistance (Assist for trunk progression)  Sit to Supine: Moderate assistance (Assist for LE progression, pt able to progress trunk)  Bed Mobility Comments: Increased time/effort; HOB elevated ~30 degrees; Min VCs with fair return    Transfers  Sit to stand: Contact guard assistance  Stand to sit: Contact guard assistance  Transfer Comments: Verbal cues for hand placement during transfers with good return, but poor carryover. Completed 2x sit<>stands with RW. Cognition  Overall Cognitive Status: Exceptions  Following Commands: Follows one step commands with repetition; Follows one step commands with increased time  Memory: Decreased short term memory  Safety Judgement: Decreased awareness of need for assistance;Decreased awareness of need for safety  Problem Solving: Assistance required to identify errors made;Assistance required to generate solutions  Insights: Decreased awareness of deficits  Initiation: Requires cues for some  Sequencing: Requires cues for some                  Education Given To: Patient  Education Provided Comments: Pt educated on OT role, OT POC, activity promotion, walker management, transfer training, importance of continued therapy-good to fair return  Education Method: Demonstration  Barriers to Learning: Cognition  Education Outcome: Verbalized understanding;Continued education needed  LUE PROM (degrees)  LUE PROM: Exceptions  L Shoulder Flex  0-180: 0-150, distal to shoulder WFL  LUE AROM (degrees)  LUE AROM : Exceptions  L Shoulder Flexion 0-180: 0-90, distal to shoulder WFL with increased time d/t tremors  Left Hand AROM (degrees)  Left Hand AROM: WFL  RUE PROM (degrees)  RUE PROM: WFL  RUE AROM (degrees)  RUE AROM : Exceptions  R Shoulder Flexion 0-180: 0-100, distal to shoulder WFL with increased time d/t tremors  Right Hand AROM (degrees)  Right Hand AROM: WFL        Hand Dominance  Hand Dominance: Right       AM-PAC Score        AM-PAC Inpatient Daily Activity Raw Score: 15 (05/31/22 1303)  AM-PAC Inpatient ADL T-Scale Score : 34.69 (05/31/22 1303)  ADL Inpatient CMS 0-100% Score: 56.46 (05/31/22 1303)  ADL Inpatient CMS G-Code Modifier : CK (05/31/22 1303)    Goals  Short Term Goals  Time Frame for Short term goals: By discharge, pt will:  Short Term Goal 1: Demo sit<>supine bed mobility with Min A and no more than 1 VC  Short Term Goal 2: Demo functional sit<>stand transfers and functional mobility with SBA and LRD PRN  Short Term Goal 3: Demo 4 minutes of dynamic standing balance with unilateral UE hand release at [facility] to promote increased engagement in ADLs  Short Term Goal 4: Demo LB dressing with Min A and use of adaptive tech  Short Term Goal 5: Demo UB dressing/bathing with SBA and use of adaptive tech PRN  Short Term Goal 6: Participate in 5 minutes of PROM/AROM, strengthening and [facility] to [facility] each session to promote increased functional use throughout all ADLs/IADLs       Therapy Time   Individual Concurrent Group Co-treatment   Time In 0848         Time Out 0945         Minutes 57         Timed Code Treatment Minutes: 23 Minutes       KITTY Rojas/L

## 2022-05-31 NOTE — PLAN OF CARE
Problem: Skin/Tissue Integrity  Goal: Absence of new skin breakdown  Description: 1. Monitor for areas of redness and/or skin breakdown  2. Assess vascular access sites hourly  3. Every 4-6 hours minimum:  Change oxygen saturation probe site  4. Every 4-6 hours:  If on nasal continuous positive airway pressure, respiratory therapy assess nares and determine need for appliance change or resting period.   5/31/2022 1525 by Vivek Hannon RN  Outcome: Progressing  5/31/2022 0301 by Chrystie Boeck, RN  Outcome: Progressing     Problem: Safety - Adult  Goal: Free from fall injury  5/31/2022 1525 by Vivek Hannon RN  Outcome: Progressing  5/31/2022 0301 by Chrystie Boeck, RN  Outcome: Progressing     Problem: Nutrition Deficit:  Goal: Optimize nutritional status  5/31/2022 1525 by Vivek Hannon RN  Outcome: Progressing  5/31/2022 0301 by Chrystie Boeck, RN  Outcome: Progressing     Problem: Discharge Planning  Goal: Discharge to home or other facility with appropriate resources  Outcome: Progressing     Problem: Pain  Goal: Verbalizes/displays adequate comfort level or baseline comfort level  Outcome: Progressing

## 2022-05-31 NOTE — PROGRESS NOTES
PROGRESS NOTE      PATIENT NAME: Eliseo Michelle  MEDICAL RECORD NO. [id number]  DATE: 2022  PRIMARY CARE PHYSICIAN: No primary care provider on file. HD: # 2    ASSESSMENT    Patient Active Problem List   Diagnosis    Weakness    Parkinson's disease ([address]    COVID-19    Cervical myelopathy (HCC)    Hypertension    Iron deficiency anemia    Moderate protein-calorie malnutrition ([facility] [address]    Community acquired bilateral lower lobe pneumonia    Encounter for palliative care    Dehydration       MEDICAL DECISION MAKING AND PLAN    Dysphagia  parkinsons disease   Plan for PEG tube placement today   No new labs  Patient is in agreement for proceeding today- timing is still pending      SUBJECTIVE  OBJECTIVE  VITALS: Temp: Temp: 98.7 °F (37.1 °C)Temp  Av.5 °F (36.9 °C)  Min: 98.2 °F (36.8 °C)  Max: 98.7 °F (79.8 °C) BP Systolic (69LSU), SONIDO:113 , Min:122 , NLW:745   Diastolic (12HWR), VNM:33, Min:53, Max:85   Pulse Pulse  Av.5  Min: 71  Max: 74 Resp Resp  Av  Min: 14  Max: 14 Pulse ox SpO2  Av.5 %  Min: 93 %  Max: 96 %    Physical Exam  Constitutional:       Appearance: He is normal weight. HENT:      Head: Normocephalic. Eyes:      Extraocular Movements: Extraocular movements intact. Cardiovascular:      Rate and Rhythm: Regular rhythm. Tachycardia present. Pulses: Normal pulses. Abdominal:      Palpations: Abdomen is soft. Neurological:      Mental Status: He is alert. I/O last 3 completed shifts: In: 625.8 [I.V.:625.8]  Out: 900 [Urine:900]    Drain/tube output: In: 1786.6 [I.V.:1786.6]  Out: 1900 [Urine:1900]    LAB:  CBC:   Recent Labs     22   WBC 6.7   HGB 8.9*   HCT 27.5*   MCV 93.5   *     BMP:   Recent Labs     22      K 3.9      CO2 25   BUN 14   CREATININE 0.78   GLUCOSE 73     COAGS: No results for input(s): APTT, PROT, INR in the last 72 hours.           RYAN MARVIN, APRN - CNP  22, 11:35 AM

## 2022-05-31 NOTE — PROGRESS NOTES
POST OP NOTE    SUBJECTIVE  Pt s/p PEG tube placement. Patient states she overall feels well and denies any current pain. Denies any nausea, vomiting, diarrhea, abdominal pain. OBJECTIVE  VITALS:  BP (!) 112/56   Pulse 66   Temp 97.3 °F (36.3 °C) (Oral)   Resp 16   Ht 5' 9.02\" (1.753 m)   Wt 137 lb (62.1 kg)   SpO2 100%   BMI 20.22 kg/m²         GENERAL:  awake and alert. No acute distress  CARDIOVASCULAR:  regular rate and rhythm   LUNGS:  CTA Bilaterally  ABDOMEN:   Abdomen soft, non-tender, non-distended, abdominal binder in place, PEG tube 2 cm at skin  INCISION: Incision clean/dry/intact, no drainage or erythema    ASSESSMENT  1.  POD# 0 s/p PEG tube placement    PLAN   See most recent progress note        Morna Blizzard, DO  Trauma/Surgery Service  5/31/2022 at 7:01 PM

## 2022-05-31 NOTE — PROGRESS NOTES
[facility] -   Infectious diseases evaluation  admission date 5/29/2022    reason for consultation:   covid     Impression :   Current:  · covid + 5/17 - treated  - out of isolation  · Dysphagia- NGT  · Hypoxemia  · Parkinson  · etoh abuse in hx    Other:  ·   Discussion / summary of stay / plan of care   ·   Recommendations   · Isolation ended 5/27  · Remove isolation and ok for any procedure required, to be done off isolation  · ID signing off    Infection Control Recommendations   · [place] Precautions  · Contact Isolation       Antimicrobial Stewardship Recommendations   · Simplification of therapy  · Targeted therapy      History of Present Illness:   Initial history:  Eleonora Grimes is a 77y.o.-year-old male post syncope and covid 5/17 and treated w remdesevir, test + 5/17, isolated till 5/27 and discharged w spirometer- he was weak at the time. His swall study was + at the time but CXr was stable  CTAP at the time shows many L3 round lesions  CT chest was neg  It was a mild covid    Back w hypoxemia and pulled his NGT and was difficult replacement due to resistance. and could not remove any stomack contents. ID called for isolation clearance due to recent covid    Clinically doing ok - appropriate and on NC  And soft and no rash  Edema both arms        Interval changes  5/31/2022   Patient Vitals for the past 8 hrs:   BP Temp Temp src Pulse Resp SpO2   05/31/22 1856 (!) 112/56 97.3 °F (36.3 °C) Oral 66 16 100 %   05/31/22 1800 (!) 114/59 -- -- 69 17 --   05/31/22 1730 110/60 -- -- 69 16 97 %   05/31/22 1715 107/63 -- -- 70 18 96 %   05/31/22 1704 111/62 97.6 °F (36.4 °C) Temporal 69 18 96 %   05/31/22 1511 (!) 154/91 98.2 °F (36.8 °C) Temporal 73 18 96 %   05/31/22 1135 139/84 98 °F (36.7 °C) Oral 77 14 100 %     No fever and feels better - no resp issues  Labs reviewed  BC and U cx neg  swal study very abnormal      Summary of relevant labs:  Labs:   Debora [place] 6.7  Creat 0.7    Micro:  covid + 5/17 and 5/29    Imaging:  CXR 5/29  Stable bibasilar infiltrates and small effusions    swall study 5/30  Grossly abnormal swallowing mechanism with residue, decreased epiglottic inversion, penetration with stasis in the laryngeal vestibular area, nasopharyngeal retrograde penetration. Excessive coughing after the test was completed. Tongue fasciculations noted with transfer of material.       I have personally reviewed the past medical history, past surgical history, medications, social history, and family history, and I haveupdated the database accordingly. Allergies:   Penicillin g     Review of Systems:     Review of Systems   Constitutional: Positive for activity change. HENT: Negative for congestion. Eyes: Negative for discharge and redness. Respiratory: Negative for apnea. Cardiovascular: Negative for chest pain and leg swelling. Gastrointestinal: Negative for abdominal distention. Endocrine: Negative for cold intolerance. Genitourinary: Negative for dysuria and flank pain. Musculoskeletal: Negative for arthralgias. Skin: Negative for color change. Allergic/Immunologic: Negative for immunocompromised state. Neurological: Positive for tremors. Negative for dizziness. Hematological: Negative for adenopathy. Psychiatric/Behavioral: Negative for agitation. Physical Examination :       Physical Exam  Constitutional:       Appearance: Normal appearance. He is not ill-appearing. HENT:      Head: Normocephalic and atraumatic. Nose: Nose normal.      Mouth/Throat:      Mouth: Mucous membranes are moist.   Eyes:      General: No scleral icterus. Conjunctiva/sclera: Conjunctivae normal.   Cardiovascular:      Rate and Rhythm: Normal rate and regular rhythm. Heart sounds: Normal heart sounds. No murmur heard. Pulmonary:      Effort: No respiratory distress. Breath sounds: Normal breath sounds.    Abdominal:      General: There is no distension. Tenderness: There is no abdominal tenderness. Genitourinary:     Comments: No broussard  Musculoskeletal:         General: No swelling, tenderness, deformity or signs of injury. Cervical back: Neck supple. No rigidity. Skin:     General: Skin is dry. Coloration: Skin is not jaundiced. Neurological:      Mental Status: He is alert. Mental status is at baseline. Cranial Nerves: No cranial nerve deficit. Psychiatric:         Mood and Affect: Mood normal.         Past Medical History:   History reviewed. No pertinent past medical history. Past Surgical  History:     Past Surgical History:   Procedure Laterality Date    GASTROSTOMY TUBE PLACEMENT  05/31/2022    UPPER GASTROINTESTINAL ENDOSCOPY  05/31/2022       Medications:      sodium chloride flush  5-40 mL IntraVENous 2 times per day    enoxaparin  40 mg SubCUTAneous Daily    amLODIPine  10 mg Per NG tube Daily    carbidopa-levodopa  1 tablet Per NG tube TID       Social History:     Social History     Socioeconomic History    Marital status: Single     Spouse name: Not on file    Number of children: Not on file    Years of education: Not on file    Highest education level: Not on file   Occupational History    Not on file   Tobacco Use    Smoking status: Former Smoker    Smokeless tobacco: Never Used   Substance and Sexual Activity    Alcohol use: Not Currently    Drug use: Not Currently    Sexual activity: Not on file   Other Topics Concern    Not on file   Social History Narrative    Not on file     Social Determinants of Health     Financial Resource Strain:     Difficulty of Paying Living Expenses: Not on file   Food Insecurity:     Worried About Running Out of Food in the Last Year: Not on file    Ashley of Food in the Last Year: Not on file   Transportation Needs:     Lack of Transportation (Medical): Not on file    Lack of Transportation (Non-Medical):  Not on file   Physical Activity:  Days of Exercise per Week: Not on file    Minutes of Exercise per Session: Not on file   Stress:     Feeling of Stress : Not on file   Social Connections:     Frequency of Communication with Friends and Family: Not on file    Frequency of Social Gatherings with Friends and Family: Not on file    Attends Mosque Services: Not on file    Active Member of [address] [place] [address] [employer] or Organizations: Not on file    Attends Club or Organization Meetings: Not on file    Marital Status: Not on file   Intimate Partner Violence:     Fear of Current or Ex-Partner: Not on file    Emotionally Abused: Not on file    Physically Abused: Not on file    Sexually Abused: Not on file   Housing Stability:     Unable to Pay for Housing in the Last Year: Not on file    Number of [place] in the Last Year: Not on file    Unstable Housing in the Last Year: Not on file       Family History:   History reviewed. No pertinent family history. Medical Decision Making:   I have independently reviewed/ordered the following labs:    CBC with Differential:   Recent Labs     05/29/22 1933   WBC 6.7   HGB 8.9*   HCT 27.5*   *   LYMPHOPCT 16*   MONOPCT 8     BMP:  Recent Labs     05/29/22 1933      K 3.9      CO2 25   BUN 14   CREATININE 0.78   MG 1.9     Hepatic Function Panel: No results for input(s): PROT, LABALBU, BILIDIR, IBILI, BILITOT, ALKPHOS, ALT, AST in the last 72 hours. No results for input(s): RPR in the last 72 hours. No results for input(s): HIV in the last 72 hours. No results for input(s): BC in the last 72 hours. Lab Results   Component Value Date    CREATININE 0.78 05/29/2022    GLUCOSE 73 05/29/2022       Detailed results: Thank you for allowing us to participate in the care of this patient. Please call with questions.     This note is created with the assistance of a speech recognition program.  While intending to generate adocument that actually reflects the content of the visit, the document can still have some errors including those of syntax and sound a like substitutions which may escape proof reading. It such instances, actual meaningcan be extrapolated by contextual diversion.     Aly Reddy MD  Office: [phone]  Perfect serve / office [phone]

## 2022-05-31 NOTE — PROGRESS NOTES
Physical Therapy  Facility/Department: [facility]  Physical Therapy Initial Assessment    Name: Fuad Silver  : 1945  MRN: [id number]  Date of Service: 2022    Discharge Recommendations: Further therapy recommended at discharge. PT Equipment Recommendations  Equipment Needed: Yes  Mobility Devices: Bj [place]: Rolling      Patient Diagnosis(es): The primary encounter diagnosis was Encounter for nasogastric tube placement. A diagnosis of Aspiration pneumonia of both lungs, unspecified aspiration pneumonia type, unspecified part of lung ([facility] [address] was also pertinent to this visit. Past Medical History:  has no past medical history on file. Past Surgical History:  has no past surgical history on file. Assessment   Body Structures, Functions, Activity Limitations Requiring Skilled Therapeutic Intervention: Decreased functional mobility ; Decreased ADL status; Decreased ROM; Decreased body mechanics; Decreased strength;Decreased safe awareness;Decreased balance;Decreased endurance; Increased pain;Decreased posture  Assessment: Pt ambulated ~3ft RW CGA with required seated break; pt ambulated another ~6ft RW CGA followed by required seated rest break d/t poor endurance and balance. Pt is a high fall risk and will require 24hr care upon DC. Pt would benefit from skilled physical therapy upon DC d/t decreased safety awareness, strength and balance and poor endurance.   Therapy Prognosis: Good  Decision Making: Medium Complexity  Requires PT Follow-Up: Yes  Activity Tolerance  Activity Tolerance: Patient limited by fatigue;Patient limited by endurance     Plan   Plan  Plan:  (5-6 times per week)  Current Treatment Recommendations: Strengthening,ROM,Balance training,Functional mobility training,Transfer training,Endurance training,Gait training,Safety education & training,Therapeutic activities  Safety Devices  Type of Devices: Left in bed,Gait belt,Patient at risk for falls,Bed alarm in place,Call light within reach,Nurse notified  Restraints  Restraints Initially in Place: No     Restrictions  Restrictions/Precautions  Restrictions/Precautions: Fall Risk  Required Braces or Orthoses?: No  Position Activity Restriction  Other position/activity restrictions: up with assistance, hx of Parkinson's     Subjective   General  Chart Reviewed: Yes  Patient assessed for rehabilitation services?: Yes  Response To Previous Treatment: Not applicable  Family / Caregiver Present: No  Follows Commands: Within Functional Limits  Subjective  Subjective: RN and pt in agreement for PT eval. Pt supine in bed on 2.5LO2 upon writer's arrival. Pt pleasantly confused and cooperative throughout PT eval.         Social/Functional History  Social/Functional History  Lives With:  (Arrived from facility)  Type of Home:  (unable to formally assess; pt is a questionable historian. Pt reports coming from SNF prior to admission but was only there for 1 day.)  Home Equipment: Rollator  ADL Assistance: Needs assistance  Toileting: Needs assistance  Homemaking Assistance: Needs assistance  Ambulation Assistance: Needs assistance  Vision/Hearing  Hearing: Within functional limits    Cognition   Orientation  Overall Orientation Status: Within Functional Limits  Cognition  Overall Cognitive Status: Exceptions  Following Commands: Follows one step commands with repetition; Follows one step commands with increased time  Memory: Decreased short term memory  Safety Judgement: Decreased awareness of need for assistance;Decreased awareness of need for safety  Insights: Decreased awareness of deficits  Initiation: Requires cues for some  Sequencing: Requires cues for some     Objective      Observation/Palpation  Posture: Poor  Gross Assessment  Sensation: Intact (pt denies nunbness/tingling)     Joint Mobility  ROM RLE: knee extension ~25 degrees AROM, ~10 PROM; ankle to neutral A/PROM; hip WFL; unable to formally assess d/t rigidity from Parkinson's  ROM LLE: knee extension ~25 degrees AROM, ~10 PROM; ankle to neutral A/PROM; hip WFL; unable to formally assess d/t rigidity from Parkinson's  ROM RUE: shoulder flexion ~30 degrees AROM, ~110 degrees PROM; elbow and wrist WFL (formally assessed by OT)  ROM LUE: shoulder flexion ~30 degrees AROM, ~110 degrees PROM; elbow and wrist WFL (formally assessed by OT)  Strength RLE  Strength RLE: Exception  Comment: hip 3/5, knee 2+/5, ankle unable to formally assess d/t rigidity  Strength LLE  Strength LLE: Exception  Comment: hip 3/5, knee 2+/5, ankle unable to formally assess d/t rigidity  Strength RUE  Strength RUE: Exception (assessed by OT)  Comment: shoulder and elbow not formally assessed d/t rigidity; hand WFL  Strength LUE  Strength LUE: Exception (assessed by OT)  Comment: shoulder and elbow not formally assessed d/t rigidity; hand WFL     Balance  Sitting:  (CGA)  Standing:  (CGA)  Gait  Overall Level of Assistance: Contact-guard assistance  Bed mobility  Rolling to Left: Minimal assistance  Rolling to Right: Minimal assistance  Supine to Sit: Moderate assistance  Sit to Supine:  (pt retired EOB with OT upon writer's exit)  Transfers  Sit to Stand: Contact guard assistance (STS x2; pt required verbal cueing for proper hand placement with poor return demo)  Stand to sit: Contact guard assistance  Ambulation  Surface: level tile  Device: Rolling Walker  Other Apparatus: O2 (2.5LO2)  Assistance: Contact guard assistance  Quality of Gait: Parkinsonian gait  Gait Deviations: Slow Yoselin; Shuffles;Decreased step length;Decreased step height  Distance: ~3ft followed by seated rest break; ~6ft requiring seated rest break to follow  Comments: pt fatigues quickly with ambulation requiring seated rest breaks d/t fatigue and reports of dizziness     Balance  Posture: Poor  Sitting - Static: Fair;+  Sitting - Dynamic: Poor;+  Standing - Static: Fair;+  Standing - Dynamic: Fair;+  Comments: standing balance assessed RW CGA, sitting balance assessed EOB Lizzy         AM-PAC Score     AM-PAC Inpatient Mobility without Stair Climbing Raw Score : 15 (05/31/22 1128)  AM-PAC Inpatient without Stair Climbing T-Scale Score : 43.03 (05/31/22 1128)  Mobility Inpatient CMS 0-100% Score: 47.43 (05/31/22 1128)  Mobility Inpatient without Stair CMS G-Code Modifier : CK (05/31/22 1128)       Goals  Short Term Goals  Time Frame for Short term goals: 15  Short term goal 1: pt to perform independent bed mobility  Short term goal 2: pt to perform functional transfers independently with no verbal cues  Short term goal 3: pt to demo good- standing dynamic balance  Short term goal 4: pt to ambulate 100ft with RW independently with improved endurance       Education  Patient Education  Education Given To: Patient  Education Provided: Role of Therapy;Plan of Care;Transfer Training;Energy Conservation; Fall Prevention Strategies  Education Provided Comments: pt educated on proper hand placement during bed mobility and functional transfers with poor return demo. Education Method: Demonstration;Verbal  Barriers to Learning: Cognition  Education Outcome: Continued education needed      Therapy Time   Individual Concurrent Group Co-treatment   Time In 0850         Time Out 1737         Minutes 35         Timed Code Treatment Minutes: [address]    Evaluation/treatment performed by Student PT under the supervision of co-signing PT who agrees with all evaluation/treatment and documentation.

## 2022-05-31 NOTE — CARE COORDINATION
Transitional Planning:  MOISES spoke to pt re: SNF referrals. He asked that I contact his brother or cousin,  Calls placed to both. Referrals per request sent to:  1. [facility]  2. [facility]. 14:00  Received call from Zeny Barahona [phone] at [facility] who states pt has used all of his medicare days and is not services connected for the [place]. Pt would need to be private pay as they do not accept medicaid pending. MOISES called brother Darren Conde as pt is very difficult to understand. Explained above. Darren Conde states pt still has 20 days at [place] and needs to return. MOISES spoke with cousin General Swan who also states pt has 20 days left and should be able to go to [facility] if they accept PEGS. CM called [facility]. No one in admissions at this time and no way to leave vm. CM to call back tomorrow. MOISES called Zeny Barahona at [employer]: above. Reached vm. Asked for call back.

## 2022-05-31 NOTE — PLAN OF CARE
Problem: Skin/Tissue Integrity  Goal: Absence of new skin breakdown  Description: 1. Monitor for areas of redness and/or skin breakdown  2. Assess vascular access sites hourly  3. Every 4-6 hours minimum:  Change oxygen saturation probe site  4. Every 4-6 hours:  If on nasal continuous positive airway pressure, respiratory therapy assess nares and determine need for appliance change or resting period.   5/31/2022 0301 by Maria Luisa Almanza RN  Outcome: Progressing  5/30/2022 1555 by Harris Gutierrez RN  Outcome: Progressing     Problem: Safety - Adult  Goal: Free from fall injury  5/31/2022 0301 by Maria Luisa Almanza RN  Outcome: Progressing  5/30/2022 1555 by Harris Gutierrez RN  Outcome: Progressing     Problem: Nutrition Deficit:  Goal: Optimize nutritional status  5/31/2022 0301 by Maria Luisa Almanza RN  Outcome: Progressing  5/30/2022 1555 by Harris Gutierrez RN  Outcome: Progressing

## 2022-05-31 NOTE — ANESTHESIA PRE PROCEDURE
Department of Anesthesiology  Preprocedure Note       Name:  Anibal Murphy   Age:  77 y.o.  :  1945                                          MRN:  [id number]         Date:  2022      Surgeon: Del Morales):  Warner Avelar MD    Procedure:     Department of Anesthesiology  Pre-Anesthesia Evaluation/Consultation         Name:  Anibal Murphy                                         Age:  77 y.o. MRN:  [id number]             Medications  Current Facility-Administered Medications   Medication Dose Route Frequency Provider Last Rate Last Admin    dextrose 5 % and 0.9 % sodium chloride infusion   IntraVENous Continuous Easton Major MD 75 mL/hr at 22 0911 Rate Verify at 22 0911    sodium chloride flush 0.9 % injection 5-40 mL  5-40 mL IntraVENous 2 times per day Nicolasa Burnett MD   10 mL at 22 0901    sodium chloride flush 0.9 % injection 5-40 mL  5-40 mL IntraVENous PRN Nicolasa Burnett MD        0.9 % sodium chloride infusion   IntraVENous PRN Nicolasa Burnett MD        enoxaparin (LOVENOX) injection 40 mg  40 mg SubCUTAneous Daily Nicolasa Burnett MD   40 mg at 22 9743    ondansetron (ZOFRAN-ODT) disintegrating tablet 4 mg  4 mg Oral Q8H PRN Nicolasa Burnett MD        Or    ondansetron [facility]) injection 4 mg  4 mg IntraVENous Q6H PRN Nicolasa Burnett MD        amLODIPine (NORVASC) tablet 10 mg  10 mg Per NG tube Daily Rica Salgado MD        carbidopa-levodopa (SINEMET)  MG per tablet 1 tablet  1 tablet Per NG tube TID Rica Salgado MD           Allergies   Allergen Reactions    Penicillin G      Patient Active Problem List   Diagnosis    Weakness    Parkinson's disease ([address]    COVID-19    Cervical myelopathy ([address]    Hypertension    Iron deficiency anemia    Moderate protein-calorie malnutrition ([address]    Community acquired bilateral lower lobe pneumonia    Encounter for palliative care    Dehydration     History reviewed.  No pertinent past medical history. History reviewed. No pertinent surgical history. Social History     Tobacco Use    Smoking status: Former Smoker    Smokeless tobacco: Never Used   Substance Use Topics    Alcohol use: Not Currently    Drug use: Not Currently         Vital Signs (Current)   Vitals:    22 1135   BP: 139/84   Pulse: 77   Resp: 14   Temp: 98 °F (36.7 °C)   SpO2: 100%     Vital Signs Statistics (for past 48 hrs)     Temp  Av.3 °F (36.8 °C)  Min: 98 °F (36.7 °C)   Min taken time: 22  Max: 98.7 °F (37.1 °C)   Max taken time: 22  Pulse  Av.9  Min: 70   Min taken time: 22  Max: 68   Max taken time: 22  Resp  Av.5  Min: 15   Min taken time: 22  Max: 23   Max taken time: 22  BP  Min: 119/64   Min taken time: 22 0849  Max: 151/62   Max taken time: 22 2145  MAP (mmHg)  Av  Min: 74   Min taken time: 22  Max: 118   Max taken time: 22 1501  SpO2  Av.1 %  Min: 93 %   Min taken time: 22  Max: 100 %   Max taken time: 22 113  BP Readings from Last 3 Encounters:   22 139/84   22 (!) 149/85       BMI  Body mass index is 20.22 kg/m².     CBC   Lab Results   Component Value Date    WBC 6.7 2022    RBC 2.94 2022    HGB 8.9 2022    HCT 27.5 2022    MCV 93.5 2022    RDW 14.7 2022     2022       CMP    Lab Results   Component Value Date     2022    K 3.9 2022     2022    CO2 25 2022    BUN 14 2022    CREATININE 0.78 2022    GFRAA >60 2022    LABGLOM >60 2022    GLUCOSE 73 2022    PROT 4.6 2022    CALCIUM 7.8 2022    BILITOT 0.23 2022    ALKPHOS 41 2022    AST 28 2022    ALT 6 2022       BMP    Lab Results   Component Value Date     2022    K 3.9 2022     2022    CO2 25 2022    BUN 14 05/29/2022    CREATININE 0.78 05/29/2022    CALCIUM 7.8 05/29/2022    GFRAA >60 05/29/2022    LABGLOM >60 05/29/2022    GLUCOSE 73 05/29/2022       POC Testing  Recent Labs     05/31/22  0923   POCGLU 98       Coags  No results found for: PROTIME, INR, APTT    HCG (If Applicable) No results found for: PREGTESTUR, PREGSERUM, HCG, HCGQUANT     ABGs No results found for: PHART, PO2ART, GYN7ETT, MGE6MBO, BEART, S7LGFYFQ     Type & Screen (If Applicable)  No results found for: LABABO, [address]    Radiology (If Applicable)    Cardiac Testing (If Applicable) EF 13%    EKG (If Applicable) SUYAPA,arrhythmia          Medications prior to admission:   Prior to Admission medications    Medication Sig Start Date End Date Taking?  Authorizing Provider   amLODIPine (NORVASC) 10 MG tablet 1 tablet by Per NG tube route daily 5/27/22 6/26/22  Madiha Osuna MD   carbidopa-levodopa (SINEMET)  MG per tablet 1 tablet by Per NG tube route 3 times daily 5/26/22 7/25/22  Madiha Osuna MD   sennosides-docusate sodium (SENOKOT-S) 8.6-50 MG tablet 2 tablets by Per NG tube route daily for 15 days 5/26/22 6/10/22  Madiha Osuna MD   Nutritional Supplements (BOOST 100 CALORIE SMART) LIQD 1 Bottle by Nasogastric route in the morning and at bedtime 5/26/22 8/24/22  Madiha Osuna MD   ferrous sulfate (FE TABS) 325 (65 Fe) MG EC tablet Take 1 tablet by mouth daily (with breakfast) 5/26/22 8/24/22  Madiha Osuna MD   amoxicillin-clavulanate (AUGMENTIN) 875-125 MG per tablet 1 tablet by Per NG tube route 2 times daily for 5 days 5/26/22 5/31/22  Madiha Osuna MD   Ergocalciferol (VITAMIN D) 58033 units CAPS Take 50,000 Units by mouth once a week for 7 doses 5/25/22 7/7/22  Ese Damian MD       Current medications:    Current Facility-Administered Medications   Medication Dose Route Frequency Provider Last Rate Last Admin    dextrose 5 % and 0.9 % sodium chloride infusion   IntraVENous Continuous Ese Damian MD 75 mL/hr at 05/31/22 6361 Rate Verify at 05/31/22 0911    sodium chloride flush 0.9 % injection 5-40 mL  5-40 mL IntraVENous 2 times per day Hugo Jo MD   10 mL at 05/31/22 0901    sodium chloride flush 0.9 % injection 5-40 mL  5-40 mL IntraVENous PRN Hugo Jo MD        0.9 % sodium chloride infusion   IntraVENous PRN Hugo Jo MD        enoxaparin (LOVENOX) injection 40 mg  40 mg SubCUTAneous Daily Hugo Jo MD   40 mg at 05/30/22 0849    ondansetron (ZOFRAN-ODT) disintegrating tablet 4 mg  4 mg Oral Q8H PRN Hugo Jo MD        Or    ondansetron [facility]) injection 4 mg  4 mg IntraVENous Q6H PRN Hugo Jo MD        amLODIPine (NORVASC) tablet 10 mg  10 mg Per NG tube Daily Rica Salgado MD        carbidopa-levodopa (SINEMET)  MG per tablet 1 tablet  1 tablet Per NG tube TID Rica Salgado MD           Allergies: Allergies   Allergen Reactions    Penicillin G        Problem List:    Patient Active Problem List   Diagnosis Code    Weakness R53.1    Parkinson's disease ([facility] [address] G20    COVID-19 U07.1    Cervical myelopathy (HCC) G95.9    Hypertension I10    Iron deficiency anemia D50.9    Moderate protein-calorie malnutrition ([facility] [address] E44.0    Community acquired bilateral lower lobe pneumonia J18.9    Encounter for palliative care Z51.5    Dehydration E86.0       Past Medical History:  History reviewed. No pertinent past medical history. Past Surgical History:  History reviewed. No pertinent surgical history.     Social History:    Social History     Tobacco Use    Smoking status: Former Smoker    Smokeless tobacco: Never Used   Substance Use Topics    Alcohol use: Not Currently                                Counseling given: Not Answered      Vital Signs (Current):   Vitals:    05/30/22 1337 05/30/22 1918 05/31/22 0818 05/31/22 1135   BP:  (!) 122/53 131/85 139/84   Pulse:  74 71 77   Resp:   14 14   Temp:  98.2 °F (36.8 °C) 98.7 °F (37.1 °C) 98 °F (36.7 °C)   TempSrc:  Oral Oral Oral   SpO2:  93% 96% 100%   Weight:       Height: 5' 9.02\" (1.753 m)                                                 BP Readings from Last 3 Encounters:   05/31/22 139/84   05/27/22 (!) 149/85       NPO Status:  MN                                                                               BMI:   Wt Readings from Last 3 Encounters:   05/29/22 137 lb (62.1 kg)   05/26/22 137 lb (62.1 kg)   05/16/22 113 lb (51.3 kg)     Body mass index is 20.22 kg/m².     CBC:   Lab Results   Component Value Date    WBC 6.7 05/29/2022    RBC 2.94 05/29/2022    HGB 8.9 05/29/2022    HCT 27.5 05/29/2022    MCV 93.5 05/29/2022    RDW 14.7 05/29/2022     05/29/2022       CMP:   Lab Results   Component Value Date     05/29/2022    K 3.9 05/29/2022     05/29/2022    CO2 25 05/29/2022    BUN 14 05/29/2022    CREATININE 0.78 05/29/2022    GFRAA >60 05/29/2022    LABGLOM >60 05/29/2022    GLUCOSE 73 05/29/2022    PROT 4.6 05/20/2022    CALCIUM 7.8 05/29/2022    BILITOT 0.23 05/20/2022    ALKPHOS 41 05/20/2022    AST 28 05/20/2022    ALT 6 05/20/2022       POC Tests:   Recent Labs     05/31/22  0923   POCGLU 98       Coags: No results found for: PROTIME, INR, APTT    HCG (If Applicable): No results found for: PREGTESTUR, PREGSERUM, HCG, HCGQUANT     ABGs: No results found for: PHART, PO2ART, MVT8OWO, PHD0NLV, BEART, T2KEZSDZ     Type & Screen (If Applicable):  No results found for: LABABO, LABRH    Drug/Infectious Status (If Applicable):  No results found for: HIV, HEPCAB    COVID-19 Screening (If Applicable):   Lab Results   Component Value Date    COVID19 DETECTED 05/29/2022           Anesthesia Evaluation   no history of anesthetic complications:   Airway: Mallampati: II     Neck ROM: full     Dental:          Pulmonary:   (+) pneumonia:  COPD:      (-) not a current smoker                          ROS comment: Community acquired pneumonia   Cardiovascular:  Exercise tolerance: poor (<4 METS), (+) hypertension:,     (-) past MI                Neuro/Psych:   (+) neuromuscular disease: Parkinson's disease,    (-) seizures and CVA            ROS comment: Cervical myelopathy GI/Hepatic/Renal:        (-) GERD      ROS comment: etoh abuse. Endo/Other:    (+) blood dyscrasia: anemia:., .    (-) diabetes mellitus               Abdominal:             Vascular: Other Findings: Beard,few teeth          Anesthesia Plan      MAC and general     ASA 4       Induction: intravenous.                             Georgina Veras MD   5/31/2022

## 2022-05-31 NOTE — PROGRESS NOTES
[id number]  Speech Language Pathology    Date: 5/31/2022  Patient Name: Sinan Cruz  YOB: 1945   AGE: 77 y.o. MRN: [id number]        Patient Not Available for Speech Therapy     Due to:  [] Testing  [] Hemodialysis  [] Cancelled by RN  [] Surgery   [] Intubation/Sedation/Pain Medication  [] Medical instability  [x] Other: Pt states he just finished working with PT and is tired, declines speech therapy for OMEX at this time.      Next scheduled treatment: 6/2/22 or later this date as able  Completed by: Rose Mary Gonzalez, SLP, M.S. [id number] [facility], [place], operated by [facility]

## 2022-05-31 NOTE — PROGRESS NOTES
[facility]  Internal Medicine Teaching Residency Program  Inpatient Daily Progress Note  ______________________________________________________________________________    Patient: Louis Sigala  YOB: 1945   POV:[id number]    Acct: [de-identified]     Room: [facility]4706-11  Admit date: 5/29/2022  Today's date: 05/31/22  Number of days in the hospital: 2    SUBJECTIVE   Admitting Diagnosis: Dehydration  CC:   Chief Complaint   Patient presents with    Feeding Tube Problem     NG removed by patient accidentally at [facility]       Pt examined at bedside. Chart & results reviewed. No acute events overnight. Patient is afebrile and hemodynamically stable. Vital signs are stable. Patient is awake, alert and oriented to time, place and person. Complains of back pain. Patient is saturating 96% on 3 L nasal cannula. He has failed swallow study and the plan is for PEG tube today general surgery. No labs from today. ROS:  Constitutional:  negative for chills, fevers, sweats  Respiratory:  negative for cough, dyspnea on exertion, hemoptysis, shortness of breath, wheezing  Cardiovascular:  negative for chest pain, chest pressure/discomfort, lower extremity edema, palpitations  Gastrointestinal:  negative for abdominal pain, constipation, diarrhea, nausea, vomiting  Neurological:  negative for dizziness, headache  BRIEF HISTORY        The patient is a pleasant 77 y.o. male  with a past medical history of parkinsonism was brought by the EMS from [address] [place] [address] after his NG tube fell out. Multiple attempts to replace NG tube were done at nursing home but was unsuccessful.   Patient is a poor historian, although stated that he was diagnosed with Parkinson's disease around 1 year ago.     Was recently admitted to the hospital on 15th May and discharged on 27th May, presented during that time with syncopal, found to be orthostatics positive. Discharged with Holter. There was a concern for aspiration due to laryngeal muscle weakness, failed swallow studies, due to Parkinson disease versus acute CVA, was treated with Augmentin for 5 days, was discharged with NG tube. Was COVID-positive during that admission. Got remdesivir.     Today patient actually pulled NG tube out, and there was hard time getting it in place. Attempts were made to place NG tube, but did not pass in the GE junction as per x-rays is coiling In the esophagus. Multiple attempts were made after withdrawing and then really advancing meeting resistance and not able to withdraw any stomach contents, eventually remove the NG.     Patient is on carbidopa levodopa for parkinsonism, on Norvasc 10 mg for essential hypertension, on ferrous sulfate for iron deficiency anemia     Last Echo - May 2022 shows LVEF of greater than 55%     ED Course: On arrival to the ED patient was afebrile  Heart rate in 70 s  Hypertensive  Hypoxic, placed on nasal cannula     Initial labs Showed -   Sodium 138  Potassium 3.8  Cr 0.78  Calcium 7.8  Wbc 6.7  Hb 8.9     COVID positive  CXR showed stable B/L opacities    OBJECTIVE     Vital Signs:  BP (!) 122/53   Pulse 74   Temp 98.2 °F (36.8 °C) (Oral)   Resp 18   Ht 5' 9.02\" (1.753 m)   Wt 137 lb (62.1 kg)   SpO2 93%   BMI 20.22 kg/m²     Temp (24hrs), Av.2 °F (36.8 °C), Min:98.2 °F (36.8 °C), Max:98.2 °F (36.8 °C)    In: 625.8   Out: 900 [Urine:900]    Physical Exam:  Constitutional: This is a well developed, well nourished, 18.5-24.9 - Normal 77y.o. year old male who is alert, oriented, cooperative and in no apparent distress. Head:normocephalic and atraumatic. EENT:  PERRLA. No conjunctival injections. Septum was midline, mucosa was without erythema, exudates or cobblestoning. No thrush was noted. Neck: Supple without thyromegaly. No elevated JVP. Trachea was midline.   Respiratory: Chest was symmetrical without dullness to percussion. Bilateral breath sounds equal, crackles noted at the bases. There is no intercostal retraction or use of accessory muscles. No egophony noted. Cardiovascular: Regular without murmur, clicks, gallops or rubs. Abdomen: Slightly rounded and soft without organomegaly. No rebound, rigidity or guarding was appreciated. Lymphatic: No lymphadenopathy. Musculoskeletal: Normal curvature of the spine. No gross muscle weakness. Extremities: Patient has classical pill-rolling movements in the hands. No lower extremity edema, ulcerations, tenderness, varicosities or erythema. Muscle size, tone and strength are normal.   Skin:  Warm and dry. Good color, turgor and pigmentation. No lesions or scars. No cyanosis or clubbing  Neurological/Psychiatric: The patient's general behavior, level of consciousness, thought content and emotional status is normal.        Medications:  Scheduled Medications:    sodium chloride flush  5-40 mL IntraVENous 2 times per day    enoxaparin  40 mg SubCUTAneous Daily    amLODIPine  10 mg Per NG tube Daily    carbidopa-levodopa  1 tablet Per NG tube TID     Continuous Infusions:    dextrose 5 % and 0.9 % NaCl 75 mL/hr at 05/30/22 1714    sodium chloride       PRN Medicationssodium chloride flush, 5-40 mL, PRN  sodium chloride, , PRN  ondansetron, 4 mg, Q8H PRN   Or  ondansetron, 4 mg, Q6H PRN        Diagnostic Labs:  CBC:   Recent Labs     05/29/22 1933   WBC 6.7   RBC 2.94*   HGB 8.9*   HCT 27.5*   MCV 93.5   RDW 14.7*   *     BMP:   Recent Labs     05/29/22 1933      K 3.9      CO2 25   BUN 14   CREATININE 0.78     BNP: No results for input(s): BNP in the last 72 hours. PT/INR: No results for input(s): PROTIME, INR in the last 72 hours. APTT: No results for input(s): APTT in the last 72 hours. CARDIAC ENZYMES: No results for input(s): CKMB, CKMBINDEX, TROPONINI in the last 72 hours.     Invalid input(s): CKTOTAL;3  FASTING LIPID PANEL:No results found for: CHOL, HDL, TRIG  LIVER PROFILE: No results for input(s): AST, ALT, ALB, BILIDIR, BILITOT, ALKPHOS in the last 72 hours. MICROBIOLOGY:   Lab Results   Component Value Date/Time    CULTURE NO GROWTH 5 DAYS 05/17/2022 09:05 AM       Imaging:    XR CHEST PORTABLE    Result Date: 5/29/2022  NG tube tip is not well seen but appears to be in the region of the GE junction. Consider advancing NG tube by 5 cm Stable bibasal opacities and small bilateral pleural effusions. .     XR CHEST PORTABLE    Result Date: 5/24/2022  Stable bibasilar parenchymal opacities and bilateral pleural effusions. XR ABDOMEN FOR NG/OG/NE TUBE PLACEMENT    Result Date: 5/29/2022  Nasogastric tube tip along the distal esophagus which is not significantly changed in position. Recommend readjusting the tube into the distal stomach Dilated loops of bowel along the upper abdomen which is less prominent Hazy bibasilar opacities which is less prominent. XR ABDOMEN FOR NG/OG/NE TUBE PLACEMENT    Result Date: 5/29/2022  ET tube tip remains at the EG junction. Recommend readjusting the tip into the distal stomach as described previously. Mildly dilated loops of bowel throughout the upper abdomen. XR ABDOMEN FOR NG/OG/NE TUBE PLACEMENT    Result Date: 5/29/2022  Enteric tube as described above. Recommend advancing 5 cm     XR ABDOMEN FOR NG/OG/NE TUBE PLACEMENT    Result Date: 5/26/2022  NG tube position, as detailed     XR ABDOMEN FOR NG/OG/NE TUBE PLACEMENT    Result Date: 5/26/2022  Suboptimally positioned nasogastric tube with tip projected at the distal esophagus. RECOMMENDATION: Advance the nasogastric tube at least 7-8 cm, then reimage to reassess tube position     XR ABDOMEN FOR NG/OG/NE TUBE PLACEMENT    Result Date: 5/26/2022  Intestinal tube is looped within a hiatal hernia above the diaphragm. Mild gaseous distension of the intestinal tract.  The findings were sent to the Radiology Results [address] at 9:45 am on 5/26/2022 to be communicated to a licensed caregiver. RECOMMENDATION: Reposition intestinal tube. FL MODIFIED BARIUM SWALLOW W VIDEO    Result Date: 5/24/2022  1. Penetration followed by aspiration with the nectar thick substance. 2. No pureed/pudding thick substance was swallowed into it was mixed with the nectar thick substance. 3. Remainder of the substances not attempted. Please see separate speech pathology report for full discussion of findings and recommendations. RECOMMENDATIONS: Unavailable       ASSESSMENT & PLAN     ASSESSMENT / PLAN:     IMPRESSION  This is a 77 y.o. male who presented with above mentioned complaints and was admitted to inpatient service for further management as follows:     Principal Problem:    Dehydration  Active Problems:    Parkinson's disease ([facility] [address]    COVID-19    Cervical myelopathy (HCC)    Hypertension    Iron deficiency anemia    Moderate protein-calorie malnutrition ([facility] [address]  Resolved Problems:    * No resolved hospital problems. *         1. Dysphagia -   - NG pulled out   - multiple failed attempts  -Failed swallow study 5/30  - Plan is for a PEG tube today as per general surgery.   -Continue hydration with D5 normal saline at 75 mL/h.    2. Acute Hypoxic respiratory failure -   - likely due to COVID 19 infection  - continue supplemental oxygen   - Wean off as tolerated     3. parkinson's disease -   - on carbidopa-levodopa TID per NG  - held for now  - will resume      4. Essential hypertension -   - Norvasc 10 mg daily. Currently held. Will resume     5. Iron deficiency anemia -   - continue oral iron supplements after PEG is placed.     6. Cervical myelopathy     7. COVID 19 infection -  - s/p remdesivir     8. Alcohol abuse -   - will start thiamine, folate and multivitamin     Diet:    NPO  DVT ppx: lovenox  GI ppx: not indicated     PT/OT/SW - consulted. Awaiting evaluation. Speech and language therapy eval and treat.   Discharge Planning - consulted Case Manager       Catha Schlatter, MD  Internal Medicine Resident, PGY-1  [facility]; [place], [place]  5/31/2022, 7:40 AM   Attending Physician Statement  I have discussed the care of Nick Bear, including pertinent history and exam findings,  with the resident. I have seen and examined the patient and the key elements of all parts of the encounter have been performed by me. I agree with the assessment, plan and orders as documented by the resident with additions . Treatment plan Discussed with nursing staff in detail , all questions answered . Electronically signed by Winsome Barnes MD on   5/31/22 at 4:26 PM EDT    Please note that this chart was generated using voice recognition Dragon dictation software. Although every effort was made to ensure the accuracy of this automated transcription, some errors in transcription may have occurred.

## 2022-06-01 PROCEDURE — 97116 GAIT TRAINING THERAPY: CPT

## 2022-06-01 PROCEDURE — 2580000003 HC RX 258: Performed by: STUDENT IN AN ORGANIZED HEALTH CARE EDUCATION/TRAINING PROGRAM

## 2022-06-01 PROCEDURE — 99233 SBSQ HOSP IP/OBS HIGH 50: CPT | Performed by: INTERNAL MEDICINE

## 2022-06-01 PROCEDURE — 94761 N-INVAS EAR/PLS OXIMETRY MLT: CPT

## 2022-06-01 PROCEDURE — 1200000000 HC SEMI PRIVATE

## 2022-06-01 PROCEDURE — 51798 US URINE CAPACITY MEASURE: CPT

## 2022-06-01 PROCEDURE — 6370000000 HC RX 637 (ALT 250 FOR IP): Performed by: STUDENT IN AN ORGANIZED HEALTH CARE EDUCATION/TRAINING PROGRAM

## 2022-06-01 PROCEDURE — 6360000002 HC RX W HCPCS: Performed by: STUDENT IN AN ORGANIZED HEALTH CARE EDUCATION/TRAINING PROGRAM

## 2022-06-01 PROCEDURE — 99232 SBSQ HOSP IP/OBS MODERATE 35: CPT | Performed by: INTERNAL MEDICINE

## 2022-06-01 PROCEDURE — 2700000000 HC OXYGEN THERAPY PER DAY

## 2022-06-01 PROCEDURE — 51701 INSERT BLADDER CATHETER: CPT

## 2022-06-01 PROCEDURE — 97530 THERAPEUTIC ACTIVITIES: CPT

## 2022-06-01 PROCEDURE — 97110 THERAPEUTIC EXERCISES: CPT

## 2022-06-01 RX ADMIN — ENOXAPARIN SODIUM 40 MG: 100 INJECTION SUBCUTANEOUS at 09:08

## 2022-06-01 RX ADMIN — CARBIDOPA AND LEVODOPA 1 TABLET: 25; 100 TABLET ORAL at 09:35

## 2022-06-01 RX ADMIN — CARBIDOPA AND LEVODOPA 1 TABLET: 25; 100 TABLET ORAL at 16:13

## 2022-06-01 RX ADMIN — SODIUM CHLORIDE, PRESERVATIVE FREE 10 ML: 5 INJECTION INTRAVENOUS at 09:08

## 2022-06-01 RX ADMIN — CARBIDOPA AND LEVODOPA 1 TABLET: 25; 100 TABLET ORAL at 22:35

## 2022-06-01 RX ADMIN — DEXTROSE AND SODIUM CHLORIDE: 5; 900 INJECTION, SOLUTION INTRAVENOUS at 04:48

## 2022-06-01 ASSESSMENT — PAIN SCALES - GENERAL
PAINLEVEL_OUTOF10: 0
PAINLEVEL_OUTOF10: 0

## 2022-06-01 ASSESSMENT — ENCOUNTER SYMPTOMS
APNEA: 0
COLOR CHANGE: 0
EYE REDNESS: 0
EYE DISCHARGE: 0
ABDOMINAL DISTENTION: 0

## 2022-06-01 NOTE — DISCHARGE INSTR - COC
Continuity of Care Form    Patient Name: Dayron Pretty   :  1945  MRN:  [id number]    Admit date:  2022  Discharge date:  ***    Code Status Order: Full Code   Advance Directives:      Admitting Physician:  Radha Valencia MD  PCP: No primary care provider on file. Discharging Nurse: [facility] Unit/Room#: 8249/3271-83  Discharging Unit Phone Number: ***    Emergency Contact:   Extended Emergency Contact Information  Primary Emergency Contact: Cristino Ahumada  Home Phone: [phone]  Relation: Brother/Sister   needed? No  Secondary Emergency ContactZina Calabrese  Home Phone: [phone]  Relation: Other Relative   needed? No    Past Surgical History:  Past Surgical History:   Procedure Laterality Date    GASTROSTOMY TUBE PLACEMENT  2022    UPPER GASTROINTESTINAL ENDOSCOPY  2022    UPPER GASTROINTESTINAL ENDOSCOPY N/A 2022    EGD ESOPHAGOGASTRODUODENOSCOPY, PEG performed by Etienne Huertas MD at [address] [place] [address] History: There is no immunization history on file for this patient. Active Problems:  Patient Active Problem List   Diagnosis Code    Weakness R53.1    Parkinson's disease ([facility] [address] G20    COVID-19 U07.1    Cervical myelopathy (HCC) G95.9    Hypertension I10    Iron deficiency anemia D50.9    Moderate protein-calorie malnutrition ([facility] [address] E44.0    Community acquired bilateral lower lobe pneumonia J18.9    Encounter for nasogastric tube placement Z46.59    Dehydration E86.0       Isolation/Infection:   Isolation            No Isolation          Patient Infection Status       Infection Onset Added Last Indicated Last Indicated By Review Planned Expiration Resolved Resolved By    None active    Resolved    COVID-19 22 COVID-19, Rapid   22 Bowen Javier RN    Resolved.  Out of isolation    COVID-19 (Rule Out) 22 COVID-19, Rapid (Ordered)   22 Rule-Out Test Resulted            Nurse Assessment:  Last Vital Signs: /60   Pulse 79   Temp 98.8 °F (37.1 °C) (Oral)   Resp 16   Ht 5' 9.02\" (1.753 m)   Wt 137 lb (62.1 kg)   SpO2 95%   BMI 20.22 kg/m²     Last documented pain score (0-10 scale): Pain Level: 0 (Sleeping)  Last Weight:   Wt Readings from Last 1 Encounters:   05/29/22 137 lb (62.1 kg)     Mental Status:  oriented, alert, coherent, and logical    IV Access:  - None    Nursing Mobility/ADLs:  Walking   Assisted  Transfer  Assisted  Bathing  Assisted  Dressing  Assisted  Toileting  Assisted  Feeding  Assisted  Med Admin  Assisted  Med Delivery    per peg    Wound Care Documentation and Therapy:        Elimination:  Continence: Bowel: {IZ:37064}  Bladder: {FV:32667}  Urinary Catheter: None   Colostomy/Ileostomy/Ileal Conduit: {TE:32537}       Date of Last BM: 6/2/2    Intake/Output Summary (Last 24 hours) at 6/1/2022 1301  Last data filed at 6/1/2022 1255  Gross per 24 hour   Intake 1406.92 ml   Output 400 ml   Net 1006.92 ml     I/O last 3 completed shifts: In: 2537.7 [I.V.:2537.7]  Out: 1900 [Urine:1900]    Safety Concerns: At Risk for Falls    Impairments/Disabilities:      Speech    Nutrition Therapy:  Current Nutrition Therapy:   - Tube Feedings:  Standard without fiber    Routes of Feeding: Gastrostomy Tube  Liquids: NPO  Daily Fluid Restriction: no  Last Modified Barium Swallow with Video (Video Swallowing Test): {Done Not Done NJAK:725613108}    Treatments at the Time of Hospital Discharge:   Respiratory Treatments: 2L  Oxygen Therapy:  {Therapy; copd oxygen:42446}  Ventilator:    - No ventilator support    Rehab Therapies: Physical Therapy, Occupational Therapy, and Speech/Language Therapy  Weight Bearing Status/Restrictions: No weight bearing restrictions  Other Medical Equipment (for information only, NOT a DME order):  walker  Other Treatments: ***    Patient's personal belongings (please select all that are sent with patient):   Bag of perry    RN SIGNATURE:  Electronically signed by Bethany Baird RN on 6/2/22 at 12:01 PM EDT    CASE MANAGEMENT/SOCIAL WORK SECTION    Inpatient Status Date: ***    Readmission Risk Assessment Score:  Readmission Risk              Risk of Unplanned Readmission:  17           Discharging to Facility/ Agency   Name: [facility]  Address:   [address] [place] [address], [facility] [address]         Phone: [phone]       Fax: [phone]        Phone:  Fax:    Dialysis Facility (if applicable)   Name:  Address:  Dialysis Schedule:  Phone:  Fax:    / signature: Electronically signed by Delray Dakin, RN on 6/2/22 at 12:12 PM EDT    PHYSICIAN SECTION    Progn osis: Fair    Condition at Discharge: Stable    Rehab Potential (if transferring to Rehab): Fair    Recommended Labs or Other Treatments After Discharge: Monitor for urinary retention. Straight catheterization may be needed if post void residual >300 ml. Physician Certification: I certify the above information and transfer of Louis Sigala  is necessary for the continuing treatment of the diagnosis listed and that he requires [facility] for greater 30 days.      Update Admission H&P: No change in H&P    PHYSICIAN SIGNATURE:  Electronically signed by Petros Isaac MD on 6/1/22 at 1:05 PM EDT

## 2022-06-01 NOTE — PROGRESS NOTES
PROGRESS NOTE      PATIENT NAME: Rhett Andrade  MEDICAL RECORD NO. [id number]  DATE: 2022  PRIMARY CARE PHYSICIAN: No primary care provider on file. HD: # 3    ASSESSMENT    Patient Active Problem List   Diagnosis    Weakness    Parkinson's disease ([facility] [address]    COVID-19    Cervical myelopathy ([facility] [address]    Hypertension    Iron deficiency anemia    Moderate protein-calorie malnutrition ([facility] [address]    Community acquired bilateral lower lobe pneumonia    Encounter for nasogastric tube placement    Dehydration       MEDICAL DECISION MAKING AND PLAN  Parkinsons disease  Dysphagia - POD#1 s/p PEG tube placement   OK to use tube feeds for meds and feeds    Acute Care Surgery to sign off at this time. Please call with any questions. SUBJECTIVE    Patient seen and examined. Vital signs stable. No acute events overnight. OBJECTIVE  VITALS: Temp: Temp: 98.8 °F (37.1 °C)Temp  Av.1 °F (36.7 °C)  Min: 97.3 °F (36.3 °C)  Max: 98.8 °F (46.2 °C) BP Systolic (57QPU), ZLD:316 , Min:104 , PRV:851   Diastolic (61YIJ), UAC:06, Min:56, Max:91   Pulse Pulse  Av.7  Min: 66  Max: 90 Resp Resp  Av.2  Min: 14  Max: 22 Pulse ox SpO2  Av.3 %  Min: 96 %  Max: 100 %    Physical Exam  Constitutional:       Appearance: He is normal weight. HENT:      Head: Normocephalic. Eyes:      Extraocular Movements: Extraocular movements intact. Cardiovascular:      Rate and Rhythm: Normal rate and regular rhythm. Pulses: Normal pulses. Abdominal:      Palpations: Abdomen is soft. Comments: PEG tube in place, 3 cm at the skin with bumper snugged   Neurological:      Mental Status: He is alert. I/O last 3 completed shifts:   In: 2537.7 [I.V.:2537.7]  Out: 1900 [Urine:1900]    Drain/tube output:  In: 2537.7 [I.V.:2537.7]  Out: 1400 [Urine:1400]    LAB:  CBC:   Recent Labs     22  1933   WBC 6.7   HGB 8.9*   HCT 27.5*   MCV 93.5   *     BMP:   Recent Labs     22  1933      K 3.9      CO2 25   BUN 14   CREATININE 0.78   GLUCOSE 73       Mandie Islas MD  6/1/22, 7:36 AM        Attending Note      I have reviewed the above TECSS note(s) and I either performed the key elements of the medical history and physical exam or was present with the resident when the key elements of the medical history and physical exam were performed. I have discussed the findings, established the care plan and recommendations with Resident Amelia Ramirez.     Babar Altamirano MD  6/1/2022  1:14 PM

## 2022-06-01 NOTE — PROGRESS NOTES
Physical Therapy  Facility/Department: [facility] ONC/MED SURG  Daily treatment noted    Name: Tanya Nowak  : 1945  MRN: [id number]  Date of Service: 2022    Discharge Recommendations:  Patient would benefit from continued therapy after discharge   PT Equipment Recommendations  Equipment Needed: Yes  Mobility Devices: Juliana Em: Rolling      Patient Diagnosis(es): The primary encounter diagnosis was Encounter for nasogastric tube placement. A diagnosis of Aspiration pneumonia of both lungs, unspecified aspiration pneumonia type, unspecified part of lung ([address] was also pertinent to this visit. Past Medical History:  has no past medical history on file. Past Surgical History:  has a past surgical history that includes Upper gastrointestinal endoscopy (2022) and Gastrostomy tube placement (2022). Assessment   Body Structures, Functions, Activity Limitations Requiring Skilled Therapeutic Intervention: Decreased functional mobility ; Decreased ADL status; Decreased ROM; Decreased body mechanics; Decreased strength;Decreased safe awareness;Decreased balance;Decreased endurance; Increased pain;Decreased posture  Assessment: Pt ambulated 8ft x2 wth RW and mod A for safety, Fatigues qucikly resquiring a seated rest break; pt demo poor endurance and balance making Pt is a high fall risk; he will require 24hr care upon DC. Pt would benefit from skilled physical therapy upon DC d/t decreased safety awareness, strength and balance and poor endurance.   Therapy Prognosis: Good  Requires PT Follow-Up: Yes  Activity Tolerance  Activity Tolerance: Patient limited by fatigue;Patient limited by endurance     Plan   Plan  Plan:  (5-6x/wk)  Current Treatment Recommendations: Strengthening,ROM,Balance training,Functional mobility training,Transfer training,Endurance training,Gait training,Safety education & training,Therapeutic activities  Safety Devices  Type of Devices: Left in bed,Gait belt,Patient at risk for falls,Bed alarm in place,Call light within reach,Nurse notified  Restraints  Restraints Initially in Place: No     Restrictions  Restrictions/Precautions  Restrictions/Precautions: Fall Risk  Required Braces or Orthoses?: No  Position Activity Restriction  Other position/activity restrictions: up with assistance, hx of Parkinson's     Subjective   General  Patient assessed for rehabilitation services?: Yes  Response To Previous Treatment: Patient with no complaints from previous session. Family / Caregiver Present: No  Follows Commands: Within Functional Limits  Subjective  Subjective: RN and pt agreeable to PT, supine in bed upon arrival , pleasnt and cooperative t/o. denies pain         Social/Functional History  Social/Functional History  Lives With:  (Arrived from facility)  Type of Home:  (unable to formally assess; pt is a questionable historian. Pt reports coming from SNF prior to admission but was only there for 1 day.)  Home Equipment: Rollator  ADL Assistance: Needs assistance  Toileting: Needs assistance  Homemaking Assistance: Needs assistance  Ambulation Assistance: Needs assistance  Vision/Hearing       Cognition   Orientation  Overall Orientation Status: Within Functional Limits  Orientation Level: Oriented X4  Cognition  Overall Cognitive Status: Exceptions  Following Commands: Follows one step commands with repetition; Follows one step commands with increased time  Memory: Decreased short term memory  Safety Judgement: Decreased awareness of need for assistance;Decreased awareness of need for safety  Problem Solving: Assistance required to identify errors made;Assistance required to generate solutions  Insights: Decreased awareness of deficits  Initiation: Requires cues for some  Sequencing: Requires cues for some     Objective      Bed mobility  Rolling to Left: Minimal assistance  Rolling to Right: Minimal assistance  Supine to Sit: Moderate assistance  Sit to Supine:  Moderate assistance  Bed Mobility Comments: Increased time/effort; HOB elevated ~30 degrees; Slow to process information ,  Transfers  Sit to Stand: Minimal Assistance  Stand to sit: Minimal Assistance  Comment: Transfer with RW. MOD cueing for hand placement and sequencing  Ambulation  Surface: level tile  Device: Rolling Walker  Other Apparatus: O2  Assistance: Minimal assistance  Quality of Gait: Parkinsonian gait with one freezing episode. pt cued to step backward the forward with good return. Gait Deviations: Slow Yoselin; Shuffles;Decreased step length;Decreased step height  Distance: 8ft x2  Comments: pt fatigues quickly with ambulation requiring a seated rest break  More Ambulation?: No  Stairs/Curb  Stairs?: No     Balance  Posture: Poor  Sitting - Static: Fair;+  Sitting - Dynamic: Fair;-  Standing - Static: Fair (stood x 3mins with flexed posture , able to correct with cueing but will return right back.)  Standing - Dynamic: Fair;+  Comments: standing balance assessed RW CGA,  Exercise Treatment: Seated LE exercise program: Long Arc Quads, hip abduction/adduction, heel/toe raises, and marches. Reps: 10  Static Sitting Balance Exercises: 8mins at EOB  Dynamic Sitting Balance Exercises: 5min with MOD a for balance and safety .       AM-PAC Score     AM-PAC Inpatient Mobility without Stair Climbing Raw Score : 13 (06/01/22 1101)  AM-PAC Inpatient without Stair Climbing T-Scale Score : 38.96 (06/01/22 1101)  Mobility Inpatient CMS 0-100% Score: 58.44 (06/01/22 1101)  Mobility Inpatient without Stair CMS G-Code Modifier : CK (06/01/22 1101)       Goals  Short Term Goals  Time Frame for Short term goals: 15  Short term goal 1: pt to perform independent bed mobility  Short term goal 2: pt to perform functional transfers independently with no verbal cues  Short term goal 3: pt to demo good- standing dynamic balance  Short term goal 4: pt to ambulate 100ft with RW independently with improved endurance       Education  Patient Education  Education Provided: Role of Therapy;Plan of Care;Transfer Training;Energy Conservation; Fall Prevention Strategies  Education Provided Comments: Pt educated on Posture and safety with mobiloity.   Education Method: Demonstration;Verbal  Barriers to Learning: Cognition  Education Outcome: Continued education needed      Therapy Time   Individual Concurrent Group Co-treatment   Time In 2746         Time Out 2116         Minutes 44         Timed Code Treatment Minutes: [address] [facility] [address], PTA

## 2022-06-01 NOTE — PROGRESS NOTES
Comprehensive Nutrition Assessment    Type and Reason for Visit:  Consult    Nutrition Recommendations/Plan:   1. Recommend TF, Standard without Fiber at 60 mL/hr to provide 1728 kcal and 80 gm pro + 306 kcal from D5  2. Monitor TF tolerance/adequacy  3. Monitor labs, wt, plan of care      Malnutrition Assessment:  Malnutrition Status:  Insufficient data (05/30/22 1337)    Context:  Chronic Illness     Findings of the 6 clinical characteristics of malnutrition:  Energy Intake:  Unable to assess  Weight Loss:  Unable to assess     Body Fat Loss:  Unable to assess     Muscle Mass Loss:  Unable to assess    Fluid Accumulation:  Unable to assess     Strength:  Not Performed    Nutrition Assessment:    Consulted for TF ordering and managment. Pt failed swallow study, s/p PEG placement 5/31. Spoke with RN, Sherron Serrato - TF order put in by MD per prior RD recommendations; TF formula being delivered to floor. Nutrition Related Findings:    labs/meds reviewed. Wound Type: None       Current Nutrition Intake & Therapies:    Average Meal Intake: NPO  Average Supplements Intake: NPO  Diet NPO  ADULT TUBE FEEDING; PEG; Standard without Fiber; Continuous; 20; Yes; 20; Q 4 hours; 60; 30; Q 6 hours  Current Tube Feeding (TF) Orders:  · Feeding Route: PEG  · Formula: Standard without Fiber  · Schedule: Continuous  · Feeding Regimen: 60 mL/hr  · Additives/Modulars:    · Water Flushes: 30 mL every 6 hours  · Current TF & Flush Orders Provides: 60 mL/hr = 1728 kcal, 80 gm pro + 306 kcal/d from D5     Additional Calorie Sources:  · D5% and 0.9% NaCl at 75ml/vm=140 kcal/d      Anthropometric Measures:  Height: 5' 9.02\" (175.3 cm)  Ideal Body Weight (IBW): 160 lbs (73 kg)       Current Body Weight: 137 lb (62.1 kg), 85.6 % IBW.     Current BMI (kg/m2): 20.2                          BMI Categories: Underweight (BMI less than 22) age over 72    Estimated Daily Nutrient Needs:  Energy Requirements Based On: Kcal/kg  Weight Used for Energy Requirements: Current  Energy (kcal/day): 1154-2395 kcal/d (28-30 kcal/kg)  Weight Used for Protein Requirements: Current  Protein (g/day): 75-90gm pro/d (1.2-1.4gm/kg)  Method Used for Fluid Requirements: 1 ml/kcal  Fluid (ml/day): 1700-1900ml/d    Nutrition Diagnosis:   · Inadequate oral intake related to swallowing difficulty as evidenced by nutrition support - enteral nutrition      Nutrition Interventions:   Food and/or Nutrient Delivery: Continue NPO,Start Tube Feeding  Nutrition Education/Counseling: No recommendation at this time  Coordination of Nutrition Care: Continue to monitor while inpatient       Goals:  Previous Goal Met:  (goal set)  Goals: Meet at least 75% of estimated needs,prior to discharge       Nutrition Monitoring and Evaluation:   Behavioral-Environmental Outcomes: None Identified  Food/Nutrient Intake Outcomes: Enteral Nutrition Intake/Tolerance  Physical Signs/Symptoms Outcomes: Biochemical Data,Nutrition Focused Physical Findings,Skin,Weight    Discharge Planning:     Too soon to determine     Twin Mack, Marlin N [facility] [address]  Contact: [phone]

## 2022-06-01 NOTE — PLAN OF CARE
Notified on-call resident of the following via secure messenger: Patient's U.O. was poor 2 incont. plus 300 from external catheter. Patient has D5W 0.9NS at 75ml/hr infusing. Bladder scan was 796ml. See orders.

## 2022-06-01 NOTE — PROGRESS NOTES
[facility]  Internal Medicine Teaching Residency Program  Inpatient Daily Progress Note  ______________________________________________________________________________    Patient: Rosario Gonzalez  YOB: 1945   LMD:[id number]    Acct: [de-identified]     Room: [facility]5607-81  Admit date: 5/29/2022  Today's date: 06/01/22  Number of days in the hospital: 3    SUBJECTIVE   Admitting Diagnosis: Dehydration  CC:   Chief Complaint   Patient presents with    Feeding Tube Problem     NG removed by patient accidentally at [facility]       Pt examined at bedside. Chart & results reviewed. No acute events overnight. Patient is afebrile, RR 16, pulse 79, /60, SPO2 95% on 2.5 L nasal cannula. Down from 4 L. Patient has no new complaints today. Denies nausea, vomiting, abdominal pain or diarrhea. Patient had PEG tube placed yesterday by general surgery. No labs from today. ROS:  Constitutional:  negative for chills, fevers, sweats  Respiratory:  negative for cough, dyspnea on exertion, hemoptysis, shortness of breath, wheezing  Cardiovascular:  negative for chest pain, chest pressure/discomfort, lower extremity edema, palpitations  Gastrointestinal:  negative for abdominal pain, constipation, diarrhea, nausea, vomiting  Neurological:  negative for dizziness, headache  BRIEF HISTORY        The patient is a pleasant 77 y.o. male  with a past medical history of parkinsonism was brought by the EMS from [address] [place] [address] after his NG tube fell out. Multiple attempts to replace NG tube were done at nursing home but was unsuccessful. Patient is a poor historian, although stated that he was diagnosed with Parkinson's disease around 1 year ago.     Was recently admitted to the hospital on 15th May and discharged on 27th May, presented during that time with syncopal, found to be orthostatics positive. Discharged with Holter.   There was a concern for aspiration due to laryngeal muscle weakness, failed swallow studies, due to Parkinson disease versus acute CVA, was treated with Augmentin for 5 days, was discharged with NG tube. Was COVID-positive during that admission. Got remdesivir.     Today patient actually pulled NG tube out, and there was hard time getting it in place. Attempts were made to place NG tube, but did not pass in the GE junction as per x-rays is coiling In the esophagus. Multiple attempts were made after withdrawing and then really advancing meeting resistance and not able to withdraw any stomach contents, eventually remove the NG.     Patient is on carbidopa levodopa for parkinsonism, on Norvasc 10 mg for essential hypertension, on ferrous sulfate for iron deficiency anemia     Last Echo - May 2022 shows LVEF of greater than 55%     ED Course: On arrival to the ED patient was afebrile  Heart rate in 70 s  Hypertensive  Hypoxic, placed on nasal cannula     Initial labs Showed -   Sodium 138  Potassium 3.8  Cr 0.78  Calcium 7.8  Wbc 6.7  Hb 8.9     COVID positive  CXR showed stable B/L opacities    OBJECTIVE     Vital Signs:  /60   Pulse 79   Temp 98.8 °F (37.1 °C) (Oral)   Resp 16   Ht 5' 9.02\" (1.753 m)   Wt 137 lb (62.1 kg)   SpO2 95%   BMI 20.22 kg/m²     Temp (24hrs), Av °F (36.7 °C), Min:97.3 °F (36.3 °C), Max:98.8 °F (37.1 °C)    In: 2537.7   Out: 1400 [Urine:1400]    Physical Exam:  Constitutional: This is a well developed, well nourished, 18.5-24.9 - Normal 77y.o. year old male who is alert, oriented, cooperative and in no apparent distress. Head:normocephalic and atraumatic. EENT:  PERRLA. No conjunctival injections. Septum was midline, mucosa was without erythema, exudates or cobblestoning. No thrush was noted. Neck: Supple without thyromegaly. No elevated JVP. Trachea was midline. Respiratory: Chest was symmetrical without dullness to percussion.   Bilateral breath sounds equal, crackles noted at the bases. There is no intercostal retraction or use of accessory muscles. No egophony noted. Cardiovascular: Regular without murmur, clicks, gallops or rubs. Abdomen: Slightly rounded and soft without organomegaly. No rebound, rigidity or guarding was appreciated. S/p PEG tube placement  Lymphatic: No lymphadenopathy. Musculoskeletal: Normal curvature of the spine. No gross muscle weakness. Extremities: Patient has classical pill-rolling movements in the hands. No lower extremity edema, ulcerations, tenderness, varicosities or erythema. Muscle size, tone and strength are normal.   Skin:  Warm and dry. Good color, turgor and pigmentation. No lesions or scars. No cyanosis or clubbing  Neurological/Psychiatric: The patient's general behavior, level of consciousness, thought content and emotional status is normal.        Medications:  Scheduled Medications:    sodium chloride flush  5-40 mL IntraVENous 2 times per day    enoxaparin  40 mg SubCUTAneous Daily    amLODIPine  10 mg Per NG tube Daily    carbidopa-levodopa  1 tablet Per NG tube TID     Continuous Infusions:    dextrose 5 % and 0.9 % NaCl 75 mL/hr at 06/01/22 0504    sodium chloride       PRN Medicationssodium chloride flush, 5-40 mL, PRN  sodium chloride, , PRN  ondansetron, 4 mg, Q8H PRN   Or  ondansetron, 4 mg, Q6H PRN        Diagnostic Labs:  CBC:   Recent Labs     05/29/22 1933   WBC 6.7   RBC 2.94*   HGB 8.9*   HCT 27.5*   MCV 93.5   RDW 14.7*   *     BMP:   Recent Labs     05/29/22 1933      K 3.9      CO2 25   BUN 14   CREATININE 0.78     BNP: No results for input(s): BNP in the last 72 hours. PT/INR: No results for input(s): PROTIME, INR in the last 72 hours. APTT: No results for input(s): APTT in the last 72 hours. CARDIAC ENZYMES: No results for input(s): CKMB, CKMBINDEX, TROPONINI in the last 72 hours.     Invalid input(s): CKTOTAL;3  FASTING LIPID PANEL:No results found for: CHOL, HDL, TRIG  LIVER PROFILE: No results for input(s): AST, ALT, ALB, BILIDIR, BILITOT, ALKPHOS in the last 72 hours. MICROBIOLOGY:   Lab Results   Component Value Date/Time    CULTURE NO GROWTH 5 DAYS 05/17/2022 09:05 AM       Imaging:    XR CHEST PORTABLE    Result Date: 5/29/2022  NG tube tip is not well seen but appears to be in the region of the GE junction. Consider advancing NG tube by 5 cm Stable bibasal opacities and small bilateral pleural effusions. .     XR CHEST PORTABLE    Result Date: 5/24/2022  Stable bibasilar parenchymal opacities and bilateral pleural effusions. XR ABDOMEN FOR NG/OG/NE TUBE PLACEMENT    Result Date: 5/29/2022  Nasogastric tube tip along the distal esophagus which is not significantly changed in position. Recommend readjusting the tube into the distal stomach Dilated loops of bowel along the upper abdomen which is less prominent Hazy bibasilar opacities which is less prominent. XR ABDOMEN FOR NG/OG/NE TUBE PLACEMENT    Result Date: 5/29/2022  ET tube tip remains at the EG junction. Recommend readjusting the tip into the distal stomach as described previously. Mildly dilated loops of bowel throughout the upper abdomen. XR ABDOMEN FOR NG/OG/NE TUBE PLACEMENT    Result Date: 5/29/2022  Enteric tube as described above. Recommend advancing 5 cm     XR ABDOMEN FOR NG/OG/NE TUBE PLACEMENT    Result Date: 5/26/2022  NG tube position, as detailed     XR ABDOMEN FOR NG/OG/NE TUBE PLACEMENT    Result Date: 5/26/2022  Suboptimally positioned nasogastric tube with tip projected at the distal esophagus. RECOMMENDATION: Advance the nasogastric tube at least 7-8 cm, then reimage to reassess tube position     XR ABDOMEN FOR NG/OG/NE TUBE PLACEMENT    Result Date: 5/26/2022  Intestinal tube is looped within a hiatal hernia above the diaphragm. Mild gaseous distension of the intestinal tract.  The findings were sent to the Radiology Results [address] at 9:45 am on 5/26/2022 to be communicated to a licensed caregiver. RECOMMENDATION: Reposition intestinal tube. FL MODIFIED BARIUM SWALLOW W VIDEO    Result Date: 5/24/2022  1. Penetration followed by aspiration with the nectar thick substance. 2. No pureed/pudding thick substance was swallowed into it was mixed with the nectar thick substance. 3. Remainder of the substances not attempted. Please see separate speech pathology report for full discussion of findings and recommendations. RECOMMENDATIONS: Unavailable       ASSESSMENT & PLAN     ASSESSMENT / PLAN:     IMPRESSION  This is a 77 y.o. male who presented with above mentioned complaints and was admitted to inpatient service for further management as follows:     Principal Problem:    Dehydration  Active Problems:    Parkinson's disease ([facility] [address]    COVID-19    Cervical myelopathy ([facility] [address]    Hypertension    Iron deficiency anemia    Moderate protein-calorie malnutrition ([facility] [address]    Encounter for nasogastric tube placement  Resolved Problems:    * No resolved hospital problems. *         1. Dysphagia -   - NG pulled out   - multiple failed attempts  -Failed swallow study 5/30  -S/p PEG tube placement 5/31/2022 by general surgery and anesthesiology.   -Continue hydration with D5 normal saline at 75 mL/h.    2. Acute Hypoxic respiratory failure -   - likely due to COVID 19 infection, s/p remdesivir. Out of isolation  - continue supplemental oxygen   - Wean off as tolerated     3. parkinson's disease -   - on carbidopa-levodopa TID  - held for now  - Resume with PEG.    4. Essential hypertension -   - Norvasc 10 mg daily. Can be resumed with PEG.    5. Iron deficiency anemia -   - continue oral iron supplements after PEG is placed.     6. Cervical myelopathy     7. COVID 19 infection -  - s/p remdesivir  -Followed by ID.     8. Alcohol abuse -   -Please start thiamine, folate and multivitamin     Diet:    Dietitian consulted for tube feeding.     DVT ppx: lovenox  GI ppx: not indicated     PT/OT/SW -received physical and occupational therapy. SLP eval and treat. Discharge Planning - consulted        Dez Bauer MD  Internal Medicine Resident, PGY-1  [facility];  [place], [place]  6/1/2022, 11:11 AM

## 2022-06-01 NOTE — CARE COORDINATION
Call placed to Zeny Barahona with [facility] @ [phone] to check on status of referral. Per Zeny Barahona, this patient is not service connected with the [place] and would have to come to SNF under his medicare benefit and patient has already used some of his full coverage SNF days. She states the patient would have a significant copay without secondary coverage and they are concerned about finances for patient. 1140: Call placed to [facility] and spoke with Jessenia Ruiz to check on status of referral. Per Jessenia Pritchard, they are reviewing and will call me back. 1150: received call back from [facility] with Am[place] stating she cannot find referral. She requested I email her a facesheet @ [email or web address]. Advised I would email facesheet and send it secure. 1210: facesheet emailed securely to [facility] with [facility] @ [email or web address] ([email or web address]). [email or web address]    1440: received call from patient's cousin General Swan regarding update on status of referrals to SNF's. Advised that [facility]am [facility] is still reviewing and that [facility] stated they would like to know what the financial plan is as the patient does not have VA benefits that cover SNF and would be coming in under his medicare benefit. Further explained that medicare only provides 20 full payment days and after that, there is a copay that would be approximately $194/day according to [facility]. General Swan states that the plan is for patient to get some rehab and go home with family support. She further states that the patient has aid services through the [place] that cover 13 hours per week to assist with bathing, etc in the home. She reports that if patient cannot be safely discharged home, that they would have to start the medicaid process for patient. [phone]: call placed to Jessenia THORNE at [facility] @ [phone] to follow up on status of referral and to inform that patient has medicare primary for SNF admission. She states that someone else is reviewing.  She will let me know if clinical needs to be faxed. 1545: attempted to call with [facility] to update regarding plan for patient as reported by family member Paul Castellanos.  Rickie Spine from [facility] did not answer and  did not state confidential. Message left requesting call back and no PHI left on voicemail message. (would like to inform shani that family understands they will have to apply for medicaid if patient cannot go back home after SNF days are exhausted with medicare.)

## 2022-06-01 NOTE — PLAN OF CARE
Problem: Skin/Tissue Integrity  Goal: Absence of new skin breakdown  Description: 1. Monitor for areas of redness and/or skin breakdown  2. Assess vascular access sites hourly  3. Every 4-6 hours minimum:  Change oxygen saturation probe site  4. Every 4-6 hours:  If on nasal continuous positive airway pressure, respiratory therapy assess nares and determine need for appliance change or resting period.   6/1/2022 1404 by Guerrero Chen RN  Outcome: Progressing  6/1/2022 0152 by Kel Walters RN  Outcome: Progressing     Problem: Safety - Adult  Goal: Free from fall injury  6/1/2022 1404 by Guerrero Chen RN  Outcome: Progressing  6/1/2022 0152 by Kel Walters RN  Outcome: Progressing     Problem: Nutrition Deficit:  Goal: Optimize nutritional status  6/1/2022 1404 by Guerrero Chen RN  Outcome: Progressing  Flowsheets (Taken 6/1/2022 1103 by Christina Cornejo RD)  Nutrient intake appropriate for improving, restoring, or maintaining nutritional needs:   Monitor oral intake, labs, and treatment plans   Recommend, monitor, and adjust tube feedings and TPN/PPN based on assessed needs   Assess nutritional status and recommend course of action  6/1/2022 0152 by Kel Walters RN  Outcome: Progressing     Problem: Discharge Planning  Goal: Discharge to home or other facility with appropriate resources  6/1/2022 1404 by Guerrero Chen RN  Outcome: Progressing  6/1/2022 0152 by Kel Walters RN  Outcome: Progressing     Problem: Pain  Goal: Verbalizes/displays adequate comfort level or baseline comfort level  6/1/2022 1404 by Guerrero Chen RN  Outcome: Progressing  6/1/2022 0152 by Kel Walters RN  Outcome: Progressing     Problem: ABCDS Injury Assessment  Goal: Absence of physical injury  6/1/2022 1404 by Guerrero Chen RN  Outcome: Progressing  6/1/2022 0152 by Kel Walters RN  Outcome: Progressing

## 2022-06-01 NOTE — PLAN OF CARE
Problem: Skin/Tissue Integrity  Goal: Absence of new skin breakdown  Description: 1. Monitor for areas of redness and/or skin breakdown  2. Assess vascular access sites hourly  3. Every 4-6 hours minimum:  Change oxygen saturation probe site  4. Every 4-6 hours:  If on nasal continuous positive airway pressure, respiratory therapy assess nares and determine need for appliance change or resting period.   6/1/2022 0152 by Joselito Davis RN  Outcome: Progressing  5/31/2022 1525 by Fe Naik RN  Outcome: Progressing     Problem: Safety - Adult  Goal: Free from fall injury  6/1/2022 0152 by Joselito Davis RN  Outcome: Progressing  5/31/2022 1525 by Fe Naik RN  Outcome: Progressing     Problem: Nutrition Deficit:  Goal: Optimize nutritional status  6/1/2022 0152 by Joselito Davis RN  Outcome: Progressing  5/31/2022 1525 by Fe Naik RN  Outcome: Progressing     Problem: Discharge Planning  Goal: Discharge to home or other facility with appropriate resources  6/1/2022 0152 by Joselito Davis RN  Outcome: Progressing  5/31/2022 1525 by Fe Naik RN  Outcome: Progressing     Problem: Pain  Goal: Verbalizes/displays adequate comfort level or baseline comfort level  6/1/2022 0152 by Joselito Davis RN  Outcome: Progressing  5/31/2022 1525 by Fe Naik RN  Outcome: Progressing     Problem: ABCDS Injury Assessment  Goal: Absence of physical injury  Outcome: Progressing

## 2022-06-01 NOTE — PROGRESS NOTES
[facility] -   Infectious diseases evaluation  admission date 5/29/2022    reason for consultation:   covid     Impression :   Current:  · covid + 5/17 - treated  - out of isolation  · Dysphagia- NGT  · Hypoxemia  · Parkinson  · etoh abuse in hx    Other:  ·   Discussion / summary of stay / plan of care   ·   Recommendations   · Isolation ended 5/27  · ID signing off    Infection Control Recommendations   · [place] Precautions  · Contact Isolation       Antimicrobial Stewardship Recommendations   · Simplification of therapy  · Targeted therapy      History of Present Illness:   Initial history:  Leodan Franz is a 77y.o.-year-old male post syncope and covid 5/17 and treated w remdesevir, test + 5/17, isolated till 5/27 and discharged w spirometer- he was weak at the time. His swall study was + at the time but CXr was stable  CTAP at the time shows many L3 round lesions  CT chest was neg  It was a mild covid    Back w hypoxemia and pulled his NGT and was difficult replacement due to resistance. and could not remove any stomack contents.     ID called for isolation clearance due to recent covid    Clinically doing ok - appropriate and on NC  And soft and no rash  Edema both arms        Interval changes  6/1/2022   Patient Vitals for the past 8 hrs:   BP Temp Temp src Pulse Resp SpO2   06/01/22 0734 114/60 98.8 °F (37.1 °C) Oral 79 16 95 %   06/01/22 0446 104/66 98.4 °F (36.9 °C) Oral 78 20 97 %   06/01/22 0015 111/63 98 °F (36.7 °C) Oral 90 22 98 %     No fever and feels better - weak and doing PT  Labs reviewed  And cx are neg    Summary of relevant labs:  Labs:  W 6.7  Creat 0.7    Micro:  covid + 5/17 and 5/29    Imaging:  CXR 5/29  Stable bibasilar infiltrates and small effusions    swall study 5/30  Grossly abnormal swallowing mechanism with residue, decreased epiglottic inversion, penetration with stasis in the laryngeal vestibular area, nasopharyngeal retrograde penetration. Excessive coughing after the test was completed. Tongue fasciculations noted with transfer of material.       I have personally reviewed the past medical history, past surgical history, medications, social history, and family history, and I haveupdated the database accordingly. Allergies:   Penicillin g     Review of Systems:     Review of Systems   Constitutional: Positive for activity change. HENT: Negative for congestion. Eyes: Negative for discharge and redness. Respiratory: Negative for apnea. Cardiovascular: Negative for chest pain and leg swelling. Gastrointestinal: Negative for abdominal distention. Endocrine: Negative for cold intolerance, polydipsia and polyphagia. Genitourinary: Negative for dysuria and flank pain. Musculoskeletal: Negative for arthralgias. Skin: Negative for color change. Allergic/Immunologic: Negative for immunocompromised state. Neurological: Positive for tremors. Negative for dizziness. Hematological: Negative for adenopathy. Psychiatric/Behavioral: Negative for agitation. Physical Examination :       Physical Exam  Constitutional:       Appearance: Normal appearance. He is not ill-appearing or diaphoretic. HENT:      Head: Normocephalic and atraumatic. Nose: Nose normal.      Mouth/Throat:      Mouth: Mucous membranes are moist.   Eyes:      General: No scleral icterus. Conjunctiva/sclera: Conjunctivae normal.   Cardiovascular:      Rate and Rhythm: Normal rate and regular rhythm. Heart sounds: Normal heart sounds. No murmur heard. Pulmonary:      Effort: No respiratory distress. Breath sounds: Normal breath sounds. Abdominal:      General: There is no distension. Tenderness: There is no abdominal tenderness. Genitourinary:     Comments: No broussard  Musculoskeletal:         General: No swelling, tenderness, deformity or signs of injury. Cervical back: Neck supple. No rigidity or tenderness. Social Gatherings with Friends and Family: Not on file    Attends Congregation Services: Not on file    Active Member of Clubs or Organizations: Not on file    Attends Club or Organization Meetings: Not on file    Marital Status: Not on file   Intimate Partner Violence:     Fear of Current or Ex-Partner: Not on file    Emotionally Abused: Not on file    Physically Abused: Not on file    Sexually Abused: Not on file   Housing Stability:     Unable to Pay for Housing in the Last Year: Not on file    Number of [place] in the Last Year: Not on file    Unstable Housing in the Last Year: Not on file       Family History:   History reviewed. No pertinent family history. Medical Decision Making:   I have independently reviewed/ordered the following labs:    CBC with Differential:   Recent Labs     05/29/22 1933   WBC 6.7   HGB 8.9*   HCT 27.5*   *   LYMPHOPCT 16*   MONOPCT 8     BMP:  Recent Labs     05/29/22 1933      K 3.9      CO2 25   BUN 14   CREATININE 0.78   MG 1.9     Hepatic Function Panel: No results for input(s): PROT, LABALBU, BILIDIR, IBILI, BILITOT, ALKPHOS, ALT, AST in the last 72 hours. No results for input(s): RPR in the last 72 hours. No results for input(s): HIV in the last 72 hours. No results for input(s): BC in the last 72 hours. Lab Results   Component Value Date    CREATININE 0.78 05/29/2022    GLUCOSE 73 05/29/2022       Detailed results: Thank you for allowing us to participate in the care of this patient. Please call with questions. This note is created with the assistance of a speech recognition program.  While intending to generate adocument that actually reflects the content of the visit, the document can still have some errors including those of syntax and sound a like substitutions which may escape proof reading. It such instances, actual meaningcan be extrapolated by contextual diversion.     Khurram Lucas MD  Office: [phone]  Perfect serve / office [phone]        I have discussed the care of the patient, including pertinent history and exam findings,  with the resident. I have seen and examined the patient and the key elements of all parts of the encounter have been performed by me. I agree with the assessment, plan and orders as documented by the resident.     Bernadine Infante, Infectious Diseases

## 2022-06-01 NOTE — ANESTHESIA POSTPROCEDURE EVALUATION
Department of Anesthesiology  Postprocedure Note    Patient: Sinan Doctor  MRN: [id number]  YOB: 1945  Date of evaluation: 5/31/2022  Time:  8:25 PM     Procedure Summary     Date: 05/31/22 Room / Location: [address]    Anesthesia Start: 1640 Anesthesia Stop: 8117    Procedure: EGD ESOPHAGOGASTRODUODENOSCOPY, PEG (N/A ) Diagnosis:       Aspiration of food, initial encounter      (Aspiration of food, initial encounter [V26.419C])    Surgeons: Arelis Ibrahim MD Responsible Provider: Nanda Acosta MD    Anesthesia Type: MAC, general ASA Status: 4          Anesthesia Type: No value filed. Nomi Phase I: Nomi Score: 8    Nomi Phase II:      Last vitals: Reviewed and per EMR flowsheets.        Anesthesia Post Evaluation    Patient location during evaluation: PACU  Patient participation: complete - patient participated  Level of consciousness: awake and alert  Pain score: 0  Nausea & Vomiting: no nausea  Cardiovascular status: hemodynamically stable  Respiratory status: nasal cannula

## 2022-06-02 VITALS
OXYGEN SATURATION: 94 % | BODY MASS INDEX: 20.29 KG/M2 | RESPIRATION RATE: 18 BRPM | SYSTOLIC BLOOD PRESSURE: 110 MMHG | WEIGHT: 137 LBS | DIASTOLIC BLOOD PRESSURE: 45 MMHG | HEIGHT: 69 IN | TEMPERATURE: 97.2 F | HEART RATE: 74 BPM

## 2022-06-02 PROCEDURE — 6370000000 HC RX 637 (ALT 250 FOR IP): Performed by: STUDENT IN AN ORGANIZED HEALTH CARE EDUCATION/TRAINING PROGRAM

## 2022-06-02 PROCEDURE — 2580000003 HC RX 258: Performed by: STUDENT IN AN ORGANIZED HEALTH CARE EDUCATION/TRAINING PROGRAM

## 2022-06-02 PROCEDURE — 99233 SBSQ HOSP IP/OBS HIGH 50: CPT | Performed by: INTERNAL MEDICINE

## 2022-06-02 PROCEDURE — 6360000002 HC RX W HCPCS: Performed by: STUDENT IN AN ORGANIZED HEALTH CARE EDUCATION/TRAINING PROGRAM

## 2022-06-02 RX ADMIN — DEXTROSE AND SODIUM CHLORIDE: 5; 900 INJECTION, SOLUTION INTRAVENOUS at 06:42

## 2022-06-02 RX ADMIN — CARBIDOPA AND LEVODOPA 1 TABLET: 25; 100 TABLET ORAL at 08:41

## 2022-06-02 RX ADMIN — CARBIDOPA AND LEVODOPA 1 TABLET: 25; 100 TABLET ORAL at 16:33

## 2022-06-02 RX ADMIN — ENOXAPARIN SODIUM 40 MG: 100 INJECTION SUBCUTANEOUS at 08:42

## 2022-06-02 RX ADMIN — AMLODIPINE BESYLATE 10 MG: 10 TABLET ORAL at 08:41

## 2022-06-02 ASSESSMENT — ENCOUNTER SYMPTOMS
EYE DISCHARGE: 0
EYE REDNESS: 0
ABDOMINAL DISTENTION: 0
PHOTOPHOBIA: 0
COLOR CHANGE: 0
APNEA: 0

## 2022-06-02 ASSESSMENT — PAIN SCALES - GENERAL
PAINLEVEL_OUTOF10: 0
PAINLEVEL_OUTOF10: 0

## 2022-06-02 NOTE — PROGRESS NOTES
Attempted to call report to [address] [place] [address] for report. Left a message on secure voicemail with number to [facility] station, [phone] to call for report.

## 2022-06-02 NOTE — DISCHARGE INSTR - DIET

## 2022-06-02 NOTE — PROGRESS NOTES
[facility] -   Infectious diseases evaluation  admission date 5/29/2022    reason for consultation:   covid     Impression :   Current:  · covid + 5/17 - treated  - out of isolation  · Dysphagia- NGT  · Hypoxemia  · Parkinson  · etoh abuse in hx    Other:  ·   Discussion / summary of stay / plan of care   ·   Recommendations   · Isolation ended 5/27  · ID signing off    Infection Control Recommendations   · [place] Precautions  · Contact Isolation       Antimicrobial Stewardship Recommendations   · Simplification of therapy  · Targeted therapy      History of Present Illness:   Initial history:  Gracy Brothers is a 77y.o.-year-old male post syncope and covid 5/17 and treated w remdesevir, test + 5/17, isolated till 5/27 and discharged w spirometer- he was weak at the time. His swall study was + at the time but CXr was stable  CTAP at the time shows many L3 round lesions  CT chest was neg  It was a mild covid    Back w hypoxemia and pulled his NGT and was difficult replacement due to resistance. and could not remove any stomack contents. ID called for isolation clearance due to recent covid    Clinically doing ok - appropriate and on NC  And soft and no rash  Edema both arms        Interval changes  6/2/2022 6/2  No acute events overnight   Pt resting comfortably in bed   No new complaints   Afebrile   Hypotensive   Relatively stable PE findings from yesterday     6/1  No fever and feels better - weak and doing PT  Labs reviewed  And cx are neg    Summary of relevant labs:  Labs:  W 6.7  Creat 0.7    Micro:  covid + 5/17 and 5/29    Imaging:  CXR 5/29  Stable bibasilar infiltrates and small effusions    swall study 5/30  Grossly abnormal swallowing mechanism with residue, decreased epiglottic inversion, penetration with stasis in the laryngeal vestibular area, nasopharyngeal retrograde penetration. Excessive coughing after the test was completed.  Tongue fasciculations noted with transfer of material.       I have personally reviewed the past medical history, past surgical history, medications, social history, and family history, and I haveupdated the database accordingly. Allergies:   Penicillin g     Review of Systems:     Review of Systems   Constitutional: Positive for activity change. Negative for chills, diaphoresis and fever. HENT: Negative for congestion. Eyes: Negative for photophobia, discharge, redness and visual disturbance. Respiratory: Negative for apnea. Cardiovascular: Negative for chest pain and leg swelling. Gastrointestinal: Negative for abdominal distention. Endocrine: Negative for cold intolerance, polydipsia and polyphagia. Genitourinary: Negative for dysuria and flank pain. Musculoskeletal: Negative for arthralgias. Skin: Negative for color change. Allergic/Immunologic: Negative for immunocompromised state. Neurological: Positive for tremors. Negative for dizziness, light-headedness and headaches. Hematological: Negative for adenopathy. Psychiatric/Behavioral: Negative for agitation. Physical Examination :       Physical Exam  Constitutional:       General: He is not in acute distress. Appearance: Normal appearance. He is not ill-appearing or diaphoretic. HENT:      Head: Normocephalic and atraumatic. Nose: Nose normal.      Mouth/Throat:      Mouth: Mucous membranes are moist.   Eyes:      General: No scleral icterus. Conjunctiva/sclera: Conjunctivae normal.   Cardiovascular:      Rate and Rhythm: Normal rate and regular rhythm. Heart sounds: Normal heart sounds. No murmur heard. Pulmonary:      Effort: No respiratory distress. Breath sounds: Normal breath sounds. No wheezing. Chest:      Chest wall: No tenderness. Abdominal:      General: There is no distension. Tenderness: There is no abdominal tenderness.    Genitourinary:     Comments: No broussard  Musculoskeletal: General: No swelling, tenderness, deformity or signs of injury. Cervical back: Neck supple. No rigidity or tenderness. Skin:     General: Skin is dry. Coloration: Skin is not jaundiced. Neurological:      Mental Status: He is alert. Mental status is at baseline. Cranial Nerves: No cranial nerve deficit. Psychiatric:         Mood and Affect: Mood normal.         Past Medical History:   History reviewed. No pertinent past medical history. Past Surgical  History:     Past Surgical History:   Procedure Laterality Date    GASTROSTOMY TUBE PLACEMENT  05/31/2022    UPPER GASTROINTESTINAL ENDOSCOPY  05/31/2022    UPPER GASTROINTESTINAL ENDOSCOPY N/A 5/31/2022    EGD ESOPHAGOGASTRODUODENOSCOPY, PEG performed by Elie Carter [facility] OR       Medications:      sodium chloride flush  5-40 mL IntraVENous 2 times per day    enoxaparin  40 mg SubCUTAneous Daily    amLODIPine  10 mg Per NG tube Daily    carbidopa-levodopa  1 tablet Per NG tube TID       Social History:     Social History     Socioeconomic History    Marital status: Single     Spouse name: Not on file    Number of children: Not on file    Years of education: Not on file    Highest education level: Not on file   Occupational History    Not on file   Tobacco Use    Smoking status: Former Smoker    Smokeless tobacco: Never Used   Substance and Sexual Activity    Alcohol use: Not Currently    Drug use: Not Currently    Sexual activity: Not on file   Other Topics Concern    Not on file   Social History Narrative    Not on file     Social Determinants of Health     Financial Resource Strain:     Difficulty of Paying Living Expenses: Not on file   Food Insecurity:     Worried About Running Out of Food in the Last Year: Not on file    Ashley of Food in the Last Year: Not on file   Transportation Needs:     Lack of Transportation (Medical): Not on file    Lack of Transportation (Non-Medical):  Not on file Physical Activity:     Days of Exercise per Week: Not on file    Minutes of Exercise per Session: Not on file   Stress:     Feeling of Stress : Not on file   Social Connections:     Frequency of Communication with Friends and Family: Not on file    Frequency of Social Gatherings with Friends and Family: Not on file    Attends [employer] Services: Not on file    Active Member of [address] [place] [address] [employer] or Organizations: Not on file    Attends Club or Organization Meetings: Not on file    Marital Status: Not on file   Intimate Partner Violence:     Fear of Current or Ex-Partner: Not on file    Emotionally Abused: Not on file    Physically Abused: Not on file    Sexually Abused: Not on file   Housing Stability:     Unable to Pay for Housing in the Last Year: Not on file    Number of [place] in the Last Year: Not on file    Unstable Housing in the Last Year: Not on file       Family History:   History reviewed. No pertinent family history. Medical Decision Making:   I have independently reviewed/ordered the following labs:    CBC with Differential:   No results for input(s): WBC, HGB, HCT, PLT, SEGSPCT, BANDSPCT, LYMPHOPCT, MONOPCT, EOSPCT in the last 72 hours. BMP:  No results for input(s): NA, K, CL, CO2, BUN, CREATININE, CA, MG in the last 72 hours. Hepatic Function Panel: No results for input(s): PROT, LABALBU, BILIDIR, IBILI, BILITOT, ALKPHOS, ALT, AST in the last 72 hours. No results for input(s): RPR in the last 72 hours. No results for input(s): HIV in the last 72 hours. No results for input(s): BC in the last 72 hours. Lab Results   Component Value Date    CREATININE 0.78 05/29/2022    GLUCOSE 73 05/29/2022       Detailed results: Thank you for allowing us to participate in the care of this patient. Please call with questions.     This note is created with the assistance of a speech recognition program.  While intending to generate adocument that actually reflects the content of the visit, the document can still have some errors including those of syntax and sound a like substitutions which may escape proof reading. It such instances, actual meaningcan be extrapolated by contextual diversion. Khurram Lucas MD  Office: [phone]  Perfect serve / office [phone]        I have discussed the care of the patient, including pertinent history and exam findings,  with the resident. I have seen and examined the patient and the key elements of all parts of the encounter have been performed by me. I agree with the assessment, plan and orders as documented by the resident.     Bernadine Infante, Infectious Diseases

## 2022-06-02 NOTE — PROGRESS NOTES
[facility]  Internal Medicine Teaching Residency Program  Inpatient Daily Progress Note  ______________________________________________________________________________    Patient: Gracy Brothers  YOB: 1945   TF:[id number]    Acct: [de-identified]     Room: 9174/8324-53  Admit date: 5/29/2022  Today's date: 06/02/22  Number of days in the hospital: 4    SUBJECTIVE   CC: Feeding Tube Problem (NG removed by patient accidentally at [facility])    Pt examined at bedside. Chart & results reviewed. - VSS, pt is saturating well on 3 L of oxygen nasal cannula  Patient is afebrile, hemodynamically stable with /45    no acute events overnight. Patient had urinary retention 796 emesis overnight. Straight catheter was ordered. - Patient denies headache, nausea, vomiting, chest pain, abdominal pain, changes in bowel or urinary habits. Patient has productive cough. ROS:  General ROS: Completed and except as mentioned above were negative   HEENT ROS: Completed and except as mentioned above were negative   Allergy and Immunology ROS:  Completed and except as mentioned above were negative  Hematological and Lymphatic ROS:  Completed and except as mentioned above were negative  Respiratory ROS:  Completed and except as mentioned above were negative  Cardiovascular ROS:  Completed and except as mentioned above were negative  Gastrointestinal ROS: Completed and except as mentioned above were negative  Genito-Urinary ROS:  Completed and except as mentioned above were negative  Musculoskeletal ROS:  Completed and except as mentioned above were negative  Neurological ROS:  Completed and except as mentioned above were negative  Skin & Dermatological ROS:  Completed and except as mentioned above were negative  Psychological ROS:  Completed and except as mentioned above were negative  BRIEF HISTORY     The patient is a pleasant 76 y. o. male  with a past medical history of parkinsonism was brought by the EMS from [facility] after his NG tube fell out.   Multiple attempts to replace NG tube were done at nursing home but was unsuccessful. Patient is a poor historian, although stated that he was diagnosed with Parkinson's disease around 1 year ago.     Was recently admitted to the hospital on 15th May and discharged on 27th May, presented during that time with syncopal, found to be orthostatics positive.  Discharged with [facility] was a concern for aspiration due to laryngeal muscle weakness, failed swallow studies, due to Parkinson disease versus acute CVA, was treated with Augmentin for 5 days, was discharged with NG tube. Was COVID-positive during that admission.  Got remdesivir.     Today patient actually pulled NG tube out, and there was hard time getting it in place.  Attempts were made to place NG tube, but did not pass in the GE junction as per x-rays is coiling In the esophagus. Multiple attempts were made after withdrawing and then really advancing meeting resistance and not able to withdraw any stomach contents, eventually remove the NG.     Patient is on carbidopa levodopa for parkinsonism, on Norvasc 10 mg for essential hypertension, on ferrous sulfate for iron deficiency anemia     OBJECTIVE     Vital Signs:  BP (!) 104/44   Pulse 63   Temp 97.8 °F (36.6 °C) (Oral)   Resp 16   Ht 5' 9.02\" (1.753 m)   Wt 137 lb (62.1 kg)   SpO2 97%   BMI 20.22 kg/m²     Temp (24hrs), Av.3 °F (36.8 °C), Min:97.8 °F (36.6 °C), Max:98.8 °F (37.1 °C)    In: 2664.7   Out: 470 [Urine:470]    Physical Exam:  Constitutional: This is a well developed, well nourished, 18.5-24.9 - Normal 77y.o. year old male who is alert, oriented, cooperative and in no apparent distress. Respiratory:   Breath sounds bilaterally were clear to auscultation. There were no wheezes, rhonchi or rales.  There is no intercostal retraction or use of accessory muscles. Cardiovascular: Regular without murmur, clicks, gallops or rubs. Abdomen: Slightly rounded and soft. No rebound, rigidity or guarding was appreciated. Extremities:  No lower extremity edema, ulcerations, tenderness, varicosities or erythema. Muscle size, tone and strength are normal.  Patient has pill-rolling tremor. Skin:  Warm and dry. Good color, turgor and pigmentation. No lesions or scars. No cyanosis or clubbing  Neurological/Psychiatric: The patient's general behavior, level of consciousness, thought content and emotional status is normal.        Medications:  Scheduled Medications:    sodium chloride flush  5-40 mL IntraVENous 2 times per day    enoxaparin  40 mg SubCUTAneous Daily    amLODIPine  10 mg Per NG tube Daily    carbidopa-levodopa  1 tablet Per NG tube TID     Continuous Infusions:    dextrose 5 % and 0.9 % NaCl 75 mL/hr at 06/02/22 0642    sodium chloride       PRN Medicationssodium chloride flush, 5-40 mL, PRN  sodium chloride, , PRN  ondansetron, 4 mg, Q8H PRN   Or  ondansetron, 4 mg, Q6H PRN        Diagnostic Labs:  CBC: No results for input(s): WBC, RBC, HGB, HCT, MCV, RDW, PLT in the last 72 hours. BMP: No results for input(s): NA, K, CL, CO2, PHOS, BUN, CREATININE, CA in the last 72 hours. BNP: No results for input(s): BNP in the last 72 hours. PT/INR: No results for input(s): PROTIME, INR in the last 72 hours. APTT: No results for input(s): APTT in the last 72 hours. CARDIAC ENZYMES: No results for input(s): CKMB, CKMBINDEX, TROPONINI in the last 72 hours. Invalid input(s): CKTOTAL;3  FASTING LIPID PANEL:No results found for: CHOL, HDL, TRIG  LIVER PROFILE: No results for input(s): AST, ALT, ALB, BILIDIR, BILITOT, ALKPHOS in the last 72 hours.    MICROBIOLOGY:   Lab Results   Component Value Date/Time    CULTURE NO GROWTH 5 DAYS 05/17/2022 09:05 AM       Imaging:    XR CHEST PORTABLE    Result Date: 5/29/2022  NG tube tip is not well seen but appears to be in the region of the GE junction. Consider advancing NG tube by 5 cm Stable bibasal opacities and small bilateral pleural effusions. .     XR ABDOMEN FOR NG/OG/NE TUBE PLACEMENT    Result Date: 5/29/2022  Nasogastric tube tip along the distal esophagus which is not significantly changed in position. Recommend readjusting the tube into the distal stomach Dilated loops of bowel along the upper abdomen which is less prominent Hazy bibasilar opacities which is less prominent. XR ABDOMEN FOR NG/OG/NE TUBE PLACEMENT    Result Date: 5/29/2022  ET tube tip remains at the EG junction. Recommend readjusting the tip into the distal stomach as described previously. Mildly dilated loops of bowel throughout the upper abdomen. XR ABDOMEN FOR NG/OG/NE TUBE PLACEMENT    Result Date: 5/29/2022  Enteric tube as described above. Recommend advancing 5 cm     XR ABDOMEN FOR NG/OG/NE TUBE PLACEMENT    Result Date: 5/26/2022  NG tube position, as detailed     XR ABDOMEN FOR NG/OG/NE TUBE PLACEMENT    Result Date: 5/26/2022  Suboptimally positioned nasogastric tube with tip projected at the distal esophagus. RECOMMENDATION: Advance the nasogastric tube at least 7-8 cm, then reimage to reassess tube position     XR ABDOMEN FOR NG/OG/NE TUBE PLACEMENT    Result Date: 5/26/2022  Intestinal tube is looped within a hiatal hernia above the diaphragm. Mild gaseous distension of the intestinal tract. The findings were sent to the Radiology Results [address] at 9:45 am on 5/26/2022 to be communicated to a licensed caregiver. RECOMMENDATION: Reposition intestinal tube. FL MODIFIED BARIUM SWALLOW W VIDEO    Result Date: 5/30/2022  Grossly abnormal swallowing mechanism with residue, decreased epiglottic inversion, penetration with stasis in the laryngeal vestibular area, nasopharyngeal retrograde penetration. Excessive coughing after the test was completed.   Tongue fasciculations noted with transfer of material. ASSESSMENT & PLAN     Principal Problem:    Dehydration  Active Problems:    Parkinson's disease ([address]    COVID-19    Cervical myelopathy (HCC)    Hypertension    Iron deficiency anemia    Moderate protein-calorie malnutrition ([address]    Encounter for nasogastric tube placement  Resolved Problems:    * No resolved hospital problems. *    . Dysphagia -   -S/p PEG tube placement 5/31/2022 by general surgery and anesthesiology.   -Continue hydration with D5 normal saline at 75 mL/h.     2. Acute Hypoxic respiratory failure -   - likely due to COVID 19 infection, s/p remdesivir. Out of isolation  - continue supplemental oxygen   - Wean off as tolerated     3. parkinson's disease -   - on carbidopa-levodopa TID     4. Essential hypertension -   - Norvasc 10 mg daily.       5. Iron deficiency anemia -   - continue oral iron supplements after PEG is placed.     6. Cervical myelopathy     7. COVID 19 infection -  - s/p remdesivir  ID signed off.         Diet:    Dietitian consulted for tube feeding. DVT ppx: lovenox  GI ppx: not indicated     PT/OT/SW -received physical and occupational therapy. SLP eval and treat.   Discharge Planning - Discharge today       Doris Mina MD  PGY-1, Internal Medicine Resident  [facility]         6/2/2022, 7:21 AM

## 2022-06-02 NOTE — CARE COORDINATION
Transitional planning note:  Call placed to [facility] and spoke with Katelin Gilbert to follow up on referral. Per Tyra Vyas, they do not have bed availability at this time. Call placed to Theron Kussmaul with [facility] to update regarding patient plans as I spoke with patient's cousin Boy Rutherford yesterday who stated that plan is for patient to go home after SNF with family support and aid services through the [place]. Further advised that family is aware that if patient requires long term needs that family knows that he will need to be applied for medicaid. Per Theron Kussmaul, she will check with nursing to see if they can accept and call me back. Call placed to patient's cousin Boy Rutherford @ [phone] to update regarding denial from [facility] and that [facility]. Requested additional SNF choices. Boy Rutherford requested referrals to [address] [place] [address] Est. Referrals sent. 1130: received call from [address] with [facility] house stating they can accept patient today. Notified bedside RN to complete the ILIR. 1140: call placed to patient's cousin Boy Rutherford to notify that [facility] has accepted and that transport is being arranged for today. Boy Rutherford agreeable with plan and will notify patient's brother as well. 1200: faxed request for transport to [address] [place] [address] for 3PM pickup via wheelchair    [phone]: perfect serve message to dr Ivy Whitley resident to request discharge med rec be updated as it still shows boost via NG tube and the patient now has PEG tube with feeds. 1230: discharge orders updated by resident. [facility]. 1245: spoke with becky with [facility] and she states they can do a 530PM  tonight. 1250 notified Calleen Kussmaul with [facility] that patient will be picked up at 46PM tonight and that AVS has been faxed. She states she will contact [facility] for previous HENS that was recently completed on patient.      1255: notified patient's cousin Boy Rutherford that patient will be picked up at 46PM tonight to

## 2022-06-06 ENCOUNTER — HOSPITAL ENCOUNTER (OUTPATIENT)
Age: 77
Setting detail: SPECIMEN
Discharge: HOME | End: 2022-06-06

## 2022-06-06 LAB
ANION GAP SERPL CALCULATED.3IONS-SCNC: 5 MMOL/L (ref 9–17)
BUN BLDV-MCNC: 21 MG/DL (ref 8–23)
CALCIUM SERPL-MCNC: 7.7 MG/DL (ref 8.6–10.4)
CHLORIDE BLD-SCNC: 102 MMOL/L (ref 98–107)
CO2: 30 MMOL/L (ref 20–31)
CREAT SERPL-MCNC: 0.73 MG/DL (ref 0.7–1.2)
GFR AFRICAN AMERICAN: >60 ML/MIN
GFR NON-AFRICAN AMERICAN: >60 ML/MIN
GFR SERPL CREATININE-BSD FRML MDRD: ABNORMAL ML/MIN/{1.73_M2}
GLUCOSE BLD-MCNC: 80 MG/DL (ref 70–99)
HCT VFR BLD CALC: 22.9 % (ref 40.7–50.3)
HEMOGLOBIN: 7.3 G/DL (ref 13–17)
MAGNESIUM: 2 MG/DL (ref 1.6–2.6)
MCH RBC QN AUTO: 30.7 PG (ref 25.2–33.5)
MCHC RBC AUTO-ENTMCNC: 31.9 G/DL (ref 28.4–34.8)
MCV RBC AUTO: 96.2 FL (ref 82.6–102.9)
NRBC AUTOMATED: 0 PER 100 WBC
PDW BLD-RTO: 15.5 % (ref 11.8–14.4)
PLATELET # BLD: 162 K/UL (ref 138–453)
PMV BLD AUTO: 11.6 FL (ref 8.1–13.5)
POTASSIUM SERPL-SCNC: 4.6 MMOL/L (ref 3.7–5.3)
RBC # BLD: 2.38 M/UL (ref 4.21–5.77)
SODIUM BLD-SCNC: 137 MMOL/L (ref 135–144)
WBC # BLD: 7.8 K/UL (ref 3.5–11.3)

## 2022-06-06 PROCEDURE — 80048 BASIC METABOLIC PNL TOTAL CA: CPT

## 2022-06-06 PROCEDURE — P9603 ONE-WAY ALLOW PRORATED MILES: HCPCS

## 2022-06-06 PROCEDURE — 83735 ASSAY OF MAGNESIUM: CPT

## 2022-06-06 PROCEDURE — 36415 COLL VENOUS BLD VENIPUNCTURE: CPT

## 2022-06-06 PROCEDURE — 85027 COMPLETE CBC AUTOMATED: CPT

## 2022-06-08 NOTE — FLOWSHEET NOTE
Patient was hooked up to a Holter, #[id number], on 5/29, and was discharged. He was [facility] or [facility]. He was given a  upon discharge.

## 2022-06-09 NOTE — DISCHARGE SUMMARY
[address]     Department of Internal Medicine - Staff Internal Medicine Teaching Service    INPATIENT DISCHARGE SUMMARY      Patient Identification:  Sinan Cruz is a 77 y.o. male. :  1945  MRN: [id number]     Acct: [de-identified]   PCP: No primary care provider on file. Admit Date:  2022  Discharge date and time: 2022  6:01 PM   Attending Provider: No att. providers found                                     [address] Problem Lists:  Principal Problem:    Dehydration  Active Problems:    Parkinson's disease ([address]    COVID-19    Cervical myelopathy [address]    Hypertension    Iron deficiency anemia    Moderate protein-calorie malnutrition ([facility] [facility]    Encounter for nasogastric tube placement  Resolved Problems:    * No resolved hospital problems. *      HOSPITAL STAY     Brief Inpatient course:   Sinan Cruz is a 77 y.o. male who presented with a past medical history of parkinsonism was brought by the EMS from [facility] after his NG tube fell out.   Multiple attempts to replace NG tube were done at nursing home but was unsuccessful. Shabbirginiizabela Mcelroy is a poor historian, although stated that he was diagnosed with Parkinson's disease around 1 year ago.     Was recently admitted to the hospital on 15th May and discharged on 27th May, presented during that time with syncopal episode, found to be orthostatics positive.  Discharged with Holter.  There was a concern for aspiration due to laryngeal muscle weakness, failed swallow studies, due to Parkinson disease versus acute CVA, was treated with Augmentin for 5 days, was discharged with NG tube. Was COVID-positive during that admission.  Got remdesivir.     Today patient actually pulled NG tube out, and there was hard time getting it in place.  Attempts were made to place NG tube, but did not pass in the GE junction as per x-rays is coiling In the esophagus.   Multiple attempts were made after withdrawing and then really advancing meeting resistance and not able to withdraw any stomach contents, and eventually NG tube was removed.     Patient is on carbidopa levodopa for parkinsonism, on Norvasc 10 mg for essential hypertension, on ferrous sulfate for iron deficiency anemia     Last Echo - May 2022 shows LVEF of greater than 55%    Assessment and plan:     1. Dysphagia -   -S/p PEG tube placement 5/31/2022 by general surgery and anesthesiology.   -Hydration with D5 normal saline 75 ml/hr     2. Acute Hypoxic respiratory failure   - likely due to COVID 19 infection, s/p remdesivir.  Out of isolation  -Received supplemental oxygen. - Wean off as tolerated     3. parkinson's disease -   - on carbidopa-levodopa TID     4. Essential hypertension -   - Norvasc 10 mg daily.        5. Iron deficiency anemia -   - continue oral iron supplements after PEG is placed.     6. Cervical myelopathy     7. COVID 19 infection -  - s/p remdesivir, was followed by infectious disease.     Diet:   tube feeds  DVT ppx: lovenox  GI ppx: not indicated     PT/OT/SW -received physical and occupational therapy. Speech and language pathology was consulted. Recommended laryngeal and pharyngeal exercises. Procedures/ Significant Interventions:    Modified barium swallow study done. Severe dysphagia noted on the study.     Consults:     Consults:     Final Specialist Recommendations/Findings:   IP CONSULT TO INTERNAL MEDICINE  IP CONSULT TO INFECTIOUS DISEASES  IP CONSULT TO GENERAL SURGERY  IP CONSULT TO IV TEAM  IP CONSULT TO DIETITIAN      Any Hospital Acquired Infections: none    Discharge Functional Status:  stable    DISCHARGE PLAN     Disposition: SNF    Patient Instructions:   Discharge Medication List as of 6/2/2022 12:23 PM      CONTINUE these medications which have NOT CHANGED    Details   amLODIPine (NORVASC) 10 MG tablet 1 tablet by Per NG tube route daily, Disp-30 tablet, R-3DC to SNF      carbidopa-levodopa

## 2022-06-21 ENCOUNTER — HOSPITAL ENCOUNTER (OUTPATIENT)
Age: 77
Setting detail: SPECIMEN
Discharge: HOME | End: 2022-06-21

## 2022-06-21 LAB
ANION GAP SERPL CALCULATED.3IONS-SCNC: 8 MMOL/L (ref 9–17)
BUN BLDV-MCNC: 35 MG/DL (ref 8–23)
BUN/CREAT BLD: 46 (ref 9–20)
CALCIUM SERPL-MCNC: 8.4 MG/DL (ref 8.6–10.4)
CHLORIDE BLD-SCNC: 103 MMOL/L (ref 98–107)
CO2: 29 MMOL/L (ref 20–31)
CREAT SERPL-MCNC: 0.76 MG/DL (ref 0.7–1.2)
GFR AFRICAN AMERICAN: >60 ML/MIN
GFR NON-AFRICAN AMERICAN: >60 ML/MIN
GFR SERPL CREATININE-BSD FRML MDRD: ABNORMAL ML/MIN/{1.73_M2}
GLUCOSE BLD-MCNC: 115 MG/DL (ref 70–99)
HCT VFR BLD CALC: 25.3 % (ref 40.7–50.3)
HEMOGLOBIN: 7.5 G/DL (ref 13–17)
MCH RBC QN AUTO: 30.5 PG (ref 25.2–33.5)
MCHC RBC AUTO-ENTMCNC: 29.6 G/DL (ref 28.4–34.8)
MCV RBC AUTO: 102.8 FL (ref 82.6–102.9)
NRBC AUTOMATED: 0 PER 100 WBC
PDW BLD-RTO: 15.5 % (ref 11.8–14.4)
PLATELET # BLD: 207 K/UL (ref 138–453)
PMV BLD AUTO: 11.8 FL (ref 8.1–13.5)
POTASSIUM SERPL-SCNC: 4.4 MMOL/L (ref 3.7–5.3)
RBC # BLD: 2.46 M/UL (ref 4.21–5.77)
SODIUM BLD-SCNC: 140 MMOL/L (ref 135–144)
WBC # BLD: 6.6 K/UL (ref 3.5–11.3)

## 2022-06-21 PROCEDURE — P9603 ONE-WAY ALLOW PRORATED MILES: HCPCS

## 2022-06-21 PROCEDURE — 85027 COMPLETE CBC AUTOMATED: CPT

## 2022-06-21 PROCEDURE — 36415 COLL VENOUS BLD VENIPUNCTURE: CPT

## 2022-06-21 PROCEDURE — 80048 BASIC METABOLIC PNL TOTAL CA: CPT

## 2022-06-24 NOTE — PROCEDURES
[address]                  [facility]                                 HOLTER MONITOR    PATIENT NAME: Lianna Ibarra                      :        1945  MED REC NO:   [id number]                             ROOM:       2257  ACCOUNT NO:   [de-identified]                           ADMIT DATE: 05/15/2022  PROVIDER:     Donn Schilder    HOLTER MONITOR 48-HOURS    DATE OF STUDY:  2022    AUTHORIZING PROVIDER:  Carlee Castillo CNP    INTERPRETING PHYSICIAN:  Donn Schilder, MD    INDICATION:  Irregular heart rate/syncope    PHYSICIAN INTERPRETATION:  1. Predominantly normal sinus rhythm. 2.  Occasional PAC's and PVC's. 3.  No atrial fibrillation/flutter.         [facility] [place]    D: 2022 16:50:03       T: 2022 16:51:20     LINETTE/DEONNA  Job#: [id number]     Doc#: Unknown    CC:    ()

## 2022-06-28 PROBLEM — E86.0 DEHYDRATION: Status: RESOLVED | Noted: 2022-05-29 | Resolved: 2022-06-28

## (undated) DEVICE — BINDER ABD UNISX 9IN 62IN L AND XL UNIV